# Patient Record
Sex: FEMALE | Race: WHITE | NOT HISPANIC OR LATINO | Employment: OTHER | URBAN - METROPOLITAN AREA
[De-identification: names, ages, dates, MRNs, and addresses within clinical notes are randomized per-mention and may not be internally consistent; named-entity substitution may affect disease eponyms.]

---

## 2017-06-14 ENCOUNTER — HOSPITAL ENCOUNTER (INPATIENT)
Facility: HOSPITAL | Age: 67
LOS: 2 days | Discharge: HOME | DRG: 641 | End: 2017-06-16
Attending: EMERGENCY MEDICINE | Admitting: INTERNAL MEDICINE
Payer: MEDICARE

## 2017-06-14 DIAGNOSIS — B37.0 THRUSH: ICD-10-CM

## 2017-06-14 DIAGNOSIS — H72.92 PERFORATED TYMPANIC MEMBRANE, LEFT: ICD-10-CM

## 2017-06-14 DIAGNOSIS — E87.1 HYPONATREMIA: Primary | ICD-10-CM

## 2017-06-14 PROBLEM — H66.91 CHRONIC OTITIS MEDIA OF RIGHT EAR WITH PERFORATED TYMPANIC MEMBRANE: Status: ACTIVE | Noted: 2017-06-14

## 2017-06-14 PROBLEM — I10 ESSENTIAL HYPERTENSION: Status: ACTIVE | Noted: 2017-06-14

## 2017-06-14 PROBLEM — Z72.0 TOBACCO ABUSE: Status: ACTIVE | Noted: 2017-06-14

## 2017-06-14 PROBLEM — H72.91 CHRONIC OTITIS MEDIA OF RIGHT EAR WITH PERFORATED TYMPANIC MEMBRANE: Status: ACTIVE | Noted: 2017-06-14

## 2017-06-14 LAB
ALBUMIN SERPL BCP-MCNC: 4 G/DL
ALP SERPL-CCNC: 77 U/L
ALT SERPL W/O P-5'-P-CCNC: 18 U/L
ANION GAP SERPL CALC-SCNC: 10 MMOL/L
APTT BLDCRRT: 31.5 SEC
AST SERPL-CCNC: 27 U/L
BASOPHILS # BLD AUTO: 0.02 K/UL
BASOPHILS NFR BLD: 0.4 %
BILIRUB SERPL-MCNC: 0.3 MG/DL
BNP SERPL-MCNC: 21 PG/ML
BUN SERPL-MCNC: 11 MG/DL
CALCIUM SERPL-MCNC: 10 MG/DL
CHLORIDE SERPL-SCNC: 78 MMOL/L
CO2 SERPL-SCNC: 28 MMOL/L
CREAT SERPL-MCNC: 0.9 MG/DL
DIFFERENTIAL METHOD: ABNORMAL
EOSINOPHIL # BLD AUTO: 0 K/UL
EOSINOPHIL NFR BLD: 0.4 %
ERYTHROCYTE [DISTWIDTH] IN BLOOD BY AUTOMATED COUNT: 18 %
EST. GFR  (AFRICAN AMERICAN): >60 ML/MIN/1.73 M^2
EST. GFR  (NON AFRICAN AMERICAN): >60 ML/MIN/1.73 M^2
GLUCOSE SERPL-MCNC: 128 MG/DL
HCT VFR BLD AUTO: 38.6 %
HGB BLD-MCNC: 13.6 G/DL
INR PPP: 1
LYMPHOCYTES # BLD AUTO: 1.3 K/UL
LYMPHOCYTES NFR BLD: 28.3 %
MAGNESIUM SERPL-MCNC: 2.2 MG/DL
MCH RBC QN AUTO: 26.1 PG
MCHC RBC AUTO-ENTMCNC: 35.2 %
MCV RBC AUTO: 74 FL
MONOCYTES # BLD AUTO: 0.5 K/UL
MONOCYTES NFR BLD: 11 %
NEUTROPHILS # BLD AUTO: 2.7 K/UL
NEUTROPHILS NFR BLD: 59.9 %
OSMOLALITY SERPL: 246 MOSM/KG
PLATELET # BLD AUTO: 298 K/UL
PMV BLD AUTO: 8.4 FL
POTASSIUM SERPL-SCNC: 4.4 MMOL/L
PROT SERPL-MCNC: 7.6 G/DL
PROTHROMBIN TIME: 10.1 SEC
RBC # BLD AUTO: 5.22 M/UL
SODIUM SERPL-SCNC: 116 MMOL/L
T4 FREE SERPL-MCNC: 1.25 NG/DL
TROPONIN I SERPL DL<=0.01 NG/ML-MCNC: <0.006 NG/ML
TSH SERPL DL<=0.005 MIU/L-ACNC: 0.57 UIU/ML
WBC # BLD AUTO: 4.53 K/UL

## 2017-06-14 PROCEDURE — 85730 THROMBOPLASTIN TIME PARTIAL: CPT

## 2017-06-14 PROCEDURE — 25000003 PHARM REV CODE 250: Performed by: EMERGENCY MEDICINE

## 2017-06-14 PROCEDURE — 25000003 PHARM REV CODE 250: Performed by: HOSPITALIST

## 2017-06-14 PROCEDURE — 99284 EMERGENCY DEPT VISIT MOD MDM: CPT | Mod: 25

## 2017-06-14 PROCEDURE — 84484 ASSAY OF TROPONIN QUANT: CPT

## 2017-06-14 PROCEDURE — 85610 PROTHROMBIN TIME: CPT

## 2017-06-14 PROCEDURE — 83930 ASSAY OF BLOOD OSMOLALITY: CPT

## 2017-06-14 PROCEDURE — 83880 ASSAY OF NATRIURETIC PEPTIDE: CPT

## 2017-06-14 PROCEDURE — 93005 ELECTROCARDIOGRAM TRACING: CPT

## 2017-06-14 PROCEDURE — 84439 ASSAY OF FREE THYROXINE: CPT

## 2017-06-14 PROCEDURE — 84443 ASSAY THYROID STIM HORMONE: CPT

## 2017-06-14 PROCEDURE — 21400001 HC TELEMETRY ROOM

## 2017-06-14 PROCEDURE — 84481 FREE ASSAY (FT-3): CPT

## 2017-06-14 PROCEDURE — 94640 AIRWAY INHALATION TREATMENT: CPT

## 2017-06-14 PROCEDURE — 80053 COMPREHEN METABOLIC PANEL: CPT

## 2017-06-14 PROCEDURE — 96360 HYDRATION IV INFUSION INIT: CPT

## 2017-06-14 PROCEDURE — 83735 ASSAY OF MAGNESIUM: CPT

## 2017-06-14 PROCEDURE — 25000242 PHARM REV CODE 250 ALT 637 W/ HCPCS: Performed by: EMERGENCY MEDICINE

## 2017-06-14 PROCEDURE — 63600175 PHARM REV CODE 636 W HCPCS: Performed by: EMERGENCY MEDICINE

## 2017-06-14 PROCEDURE — 85025 COMPLETE CBC W/AUTO DIFF WBC: CPT

## 2017-06-14 PROCEDURE — 36415 COLL VENOUS BLD VENIPUNCTURE: CPT

## 2017-06-14 RX ORDER — ACETAMINOPHEN 500 MG
1000 TABLET ORAL
Status: COMPLETED | OUTPATIENT
Start: 2017-06-14 | End: 2017-06-14

## 2017-06-14 RX ORDER — ASPIRIN 325 MG
325 TABLET ORAL DAILY
Status: DISCONTINUED | OUTPATIENT
Start: 2017-06-15 | End: 2017-06-15

## 2017-06-14 RX ORDER — IBUPROFEN 200 MG
1 TABLET ORAL DAILY
Status: DISCONTINUED | OUTPATIENT
Start: 2017-06-15 | End: 2017-06-16 | Stop reason: HOSPADM

## 2017-06-14 RX ORDER — ROSUVASTATIN CALCIUM 10 MG/1
10 TABLET, COATED ORAL DAILY
Status: DISCONTINUED | OUTPATIENT
Start: 2017-06-15 | End: 2017-06-16 | Stop reason: HOSPADM

## 2017-06-14 RX ORDER — POLYETHYLENE GLYCOL 3350 17 G/17G
17 POWDER, FOR SOLUTION ORAL DAILY
Status: DISCONTINUED | OUTPATIENT
Start: 2017-06-15 | End: 2017-06-16 | Stop reason: HOSPADM

## 2017-06-14 RX ORDER — ENOXAPARIN SODIUM 100 MG/ML
40 INJECTION SUBCUTANEOUS EVERY 24 HOURS
Status: DISCONTINUED | OUTPATIENT
Start: 2017-06-14 | End: 2017-06-16 | Stop reason: HOSPADM

## 2017-06-14 RX ORDER — VALACYCLOVIR HYDROCHLORIDE 500 MG/1
1000 TABLET, FILM COATED ORAL 3 TIMES DAILY PRN
Status: DISCONTINUED | OUTPATIENT
Start: 2017-06-14 | End: 2017-06-15

## 2017-06-14 RX ORDER — VALACYCLOVIR HYDROCHLORIDE 500 MG/1
1000 TABLET, FILM COATED ORAL 3 TIMES DAILY
Status: DISCONTINUED | OUTPATIENT
Start: 2017-06-14 | End: 2017-06-14

## 2017-06-14 RX ORDER — METOCLOPRAMIDE 10 MG/1
10 TABLET ORAL EVERY 6 HOURS PRN
Status: DISCONTINUED | OUTPATIENT
Start: 2017-06-14 | End: 2017-06-15

## 2017-06-14 RX ORDER — AMLODIPINE BESYLATE 5 MG/1
10 TABLET ORAL DAILY
Status: DISCONTINUED | OUTPATIENT
Start: 2017-06-15 | End: 2017-06-16 | Stop reason: HOSPADM

## 2017-06-14 RX ORDER — FLUCONAZOLE 50 MG/1
200 TABLET ORAL
Status: COMPLETED | OUTPATIENT
Start: 2017-06-14 | End: 2017-06-14

## 2017-06-14 RX ORDER — IPRATROPIUM BROMIDE AND ALBUTEROL SULFATE 2.5; .5 MG/3ML; MG/3ML
3 SOLUTION RESPIRATORY (INHALATION)
Status: COMPLETED | OUTPATIENT
Start: 2017-06-14 | End: 2017-06-14

## 2017-06-14 RX ORDER — BUTALBITAL, ACETAMINOPHEN AND CAFFEINE 50; 325; 40 MG/1; MG/1; MG/1
1 TABLET ORAL EVERY 6 HOURS PRN
Status: DISCONTINUED | OUTPATIENT
Start: 2017-06-14 | End: 2017-06-16 | Stop reason: HOSPADM

## 2017-06-14 RX ORDER — HYDRALAZINE HYDROCHLORIDE 20 MG/ML
10 INJECTION INTRAMUSCULAR; INTRAVENOUS EVERY 6 HOURS PRN
Status: DISCONTINUED | OUTPATIENT
Start: 2017-06-14 | End: 2017-06-16 | Stop reason: HOSPADM

## 2017-06-14 RX ORDER — IPRATROPIUM BROMIDE AND ALBUTEROL SULFATE 2.5; .5 MG/3ML; MG/3ML
3 SOLUTION RESPIRATORY (INHALATION) EVERY 6 HOURS
Status: DISCONTINUED | OUTPATIENT
Start: 2017-06-15 | End: 2017-06-16

## 2017-06-14 RX ORDER — SODIUM CHLORIDE 9 MG/ML
INJECTION, SOLUTION INTRAVENOUS CONTINUOUS
Status: DISCONTINUED | OUTPATIENT
Start: 2017-06-15 | End: 2017-06-15

## 2017-06-14 RX ORDER — LEVOTHYROXINE SODIUM 125 UG/1
125 TABLET ORAL DAILY
Status: DISCONTINUED | OUTPATIENT
Start: 2017-06-15 | End: 2017-06-16 | Stop reason: HOSPADM

## 2017-06-14 RX ORDER — ALBUTEROL SULFATE 2.5 MG/.5ML
5 SOLUTION RESPIRATORY (INHALATION)
Status: COMPLETED | OUTPATIENT
Start: 2017-06-14 | End: 2017-06-14

## 2017-06-14 RX ORDER — ZOLPIDEM TARTRATE 5 MG/1
5 TABLET ORAL NIGHTLY PRN
Status: DISCONTINUED | OUTPATIENT
Start: 2017-06-14 | End: 2017-06-14

## 2017-06-14 RX ADMIN — ENOXAPARIN SODIUM 40 MG: 100 INJECTION SUBCUTANEOUS at 10:06

## 2017-06-14 RX ADMIN — FLUCONAZOLE 200 MG: 50 TABLET ORAL at 06:06

## 2017-06-14 RX ADMIN — BUTALBITAL, ACETAMINOPHEN AND CAFFEINE 1 TABLET: 50; 325; 40 TABLET ORAL at 10:06

## 2017-06-14 RX ADMIN — SODIUM CHLORIDE 1000 ML: 0.9 INJECTION, SOLUTION INTRAVENOUS at 06:06

## 2017-06-14 RX ADMIN — ALBUTEROL SULFATE 5 MG: 2.5 SOLUTION RESPIRATORY (INHALATION) at 03:06

## 2017-06-14 RX ADMIN — ACETAMINOPHEN 500 MG: 500 TABLET ORAL at 08:06

## 2017-06-14 RX ADMIN — IPRATROPIUM BROMIDE AND ALBUTEROL SULFATE 3 ML: .5; 3 SOLUTION RESPIRATORY (INHALATION) at 04:06

## 2017-06-14 RX ADMIN — SODIUM CHLORIDE 1000 ML: 0.9 INJECTION, SOLUTION INTRAVENOUS at 11:06

## 2017-06-14 NOTE — ED PROVIDER NOTES
"Encounter Date: 6/14/2017    SCRIBE #1 NOTE: I, Mitesh Santosh, am scribing for, and in the presence of, Javier Bridges MD. Other sections scribed: HPI, ROS.       History     Chief Complaint   Patient presents with    Otalgia     States she has chronic ear problems and her L ear is uncomfortable and not draining. States she has pressure in the ear.     Shortness of Breath     States she feels SOB and wants to be checked because she's not sure if it is head/sinus related or lung related. Not a good historian.     Review of patient's allergies indicates:   Allergen Reactions    Benzodiazepines     Lidocaine     Toradol [ketorolac]     Tramadol      CC: Otalgia, Shortness of Breath  HPI: This 66 y.o. female smoker with rheumatic disease, HTN, asthma, and Hx of thyroid cancer s/p thyroidectomy and Iodine 131 therapy presents to the ED c/o worsening of her chronic L ear pain ("pressure") over the past week or so. She states that her ear is "not draining anymore", causing her to feel SOB "like I'm not moving enough air into my head" and have decreased hearing in her L ear. She reports dry mouth that has developed within the past day. Pt reports that her L eardrum has been perforated and giving her problems since she had radiation therapy done for her thyroid cancer 25 years ago. Pt states that she is often on abx for her ear problems, and she has been taking 1 dose of Bactrim daily for the past week. She states that she is out of her Diflucan; pt is concerned that "the fungus" may be colonizing her mouth. Pt also c/o SOB for the past week; she states happened before when she had ear problems. Pt denies fever, chills, cough, chest pain, abdominal pain.      The history is provided by the patient.     Past Medical History:   Diagnosis Date    Asthma     Back problem     Calcaneal spur     Chicken pox 2015    Hypertension     Rheumatic disease     Thyroid cancer      Past Surgical History:   Procedure Laterality " Date    HYSTERECTOMY      left knee      THYROIDECTOMY      TONSILLECTOMY       Family History   Problem Relation Age of Onset    Depression Mother     Hypertension Sister     Blindness Neg Hx     Glaucoma Neg Hx     Macular degeneration Neg Hx     Retinal detachment Neg Hx      Social History   Substance Use Topics    Smoking status: Current Every Day Smoker     Packs/day: 0.50     Years: 15.00     Types: Cigarettes    Smokeless tobacco: Never Used    Alcohol use No     Review of Systems   Constitutional: Negative for chills and fever.   HENT: Positive for ear pain (L). Negative for ear discharge and sore throat.         (+) dry mouth   Eyes: Negative for pain and visual disturbance.   Respiratory: Positive for shortness of breath. Negative for cough.    Cardiovascular: Negative for chest pain.   Gastrointestinal: Negative for abdominal pain, diarrhea, nausea and vomiting.   Genitourinary: Negative for difficulty urinating and dysuria.   Musculoskeletal: Negative for arthralgias and myalgias.   Skin: Negative for rash and wound.   Neurological: Negative for dizziness and headaches.       Physical Exam     Initial Vitals [06/14/17 1359]   BP Pulse Resp Temp SpO2   120/64 94 18 97.8 °F (36.6 °C) (!) 94 %     Physical Exam  The patient was examined specifically for the following:   General:No significant distress, Good color, Warm and dry. Head and neck:Scalp atraumatic, Neck supple. Neurological:Appropriate conversation, Gross motor deficits. Eyes:Conjugate gaze, Clear corneas. ENT: No epistaxis. Cardiac: Regular rate and rhythm, Grossly normal heart tones. Pulmonary: Wheezing, Rales. Gastrointestinal: Abdominal tenderness, Abdominal distention. Musculoskeletal: Extremity deformity, Apparent pain with range of motion of the joints. Skin: Rash.   The findings on examination were normal except for the following: The patient does seem to have labored breathing.  The lungs are clear and free wheezing rales  of the rhonchi.  There may be some reduced breath sounds in the right base.  The patient has what looks like old perforated tympanic membrane.  There is no erythema of the tympanic membrane.  There is no pus or discharge in the canal.  The patient has small whitish exudates on the roof of the mouth.  She has had thrush before.  The remainder the pharynx is unremarkable.  The abdomen is soft.  Heart tones are normal.  The patient has a regular rate and rhythm.  ED Course   Procedures  Labs Reviewed   COMPREHENSIVE METABOLIC PANEL - Abnormal; Notable for the following:        Result Value    Sodium 116 (*)     Chloride 78 (*)     Glucose 128 (*)     All other components within normal limits    Narrative:     NA   critical result(s) called and verbal readback obtained from   EMBER GAMEZ., 06/14/2017 16:42   CBC W/ AUTO DIFFERENTIAL - Abnormal; Notable for the following:     MCV 74 (*)     MCH 26.1 (*)     RDW 18.0 (*)     MPV 8.4 (*)     All other components within normal limits   APTT   PROTIME-INR   TROPONIN I   B-TYPE NATRIURETIC PEPTIDE   MAGNESIUM     EKG Readings: (Independently Interpreted)   The patient's EKG reveals a normal sinus rhythm with a heart rate of 90.  The ND QRS and QT intervals are normal.  There are nonspecific T-wave changes.  There are no significant ST segment changes.  There is poor R-wave progression across precordium.  Patient has a normal axis.       X-Rays:   Independently Interpreted Readings:   Other Readings:  Chest x-ray fails to reveal pneumothorax pneumonia pleural effusion               Scribe Attestation:   Scribe #1: I performed the above scribed service and the documentation accurately describes the services I performed. I attest to the accuracy of the note.    Attending Attestation:           Physician Attestation for Scribe:  Physician Attestation Statement for Scribe #1: I, Javier Bridges MD, reviewed documentation, as scribed by Mitesh Frost in my presence, and it is both  accurate and complete.                 ED Course     Clinical Impression:   The primary encounter diagnosis was Hyponatremia. Diagnoses of Thrush and Perforated tympanic membrane, left were also pertinent to this visit.          Javier Bridges MD  06/14/17 1943

## 2017-06-14 NOTE — ED TRIAGE NOTES
"Pt amb to room 1, here for evaluation of dizziness and shortness of breath  Onset 5 days being evaluated by primary physician for left ear infection that keeps reoccurring. Pt states, "I feel like I've been having labored breathing". No relief with bactrim. Pt states, " I think it's fungus". C/o being thristy. midsternal chest pressure x one week  "

## 2017-06-15 LAB
ANION GAP SERPL CALC-SCNC: 7 MMOL/L
BUN SERPL-MCNC: 7 MG/DL
CALCIUM SERPL-MCNC: 8.5 MG/DL
CHLORIDE SERPL-SCNC: 87 MMOL/L
CO2 SERPL-SCNC: 29 MMOL/L
CREAT SERPL-MCNC: 0.6 MG/DL
EST. GFR  (AFRICAN AMERICAN): >60 ML/MIN/1.73 M^2
EST. GFR  (NON AFRICAN AMERICAN): >60 ML/MIN/1.73 M^2
ESTIMATED AVG GLUCOSE: 143 MG/DL
GLUCOSE SERPL-MCNC: 89 MG/DL
HBA1C MFR BLD HPLC: 6.6 %
OSMOLALITY UR: 227 MOSM/KG
POTASSIUM SERPL-SCNC: 3.6 MMOL/L
SODIUM SERPL-SCNC: 122 MMOL/L
SODIUM SERPL-SCNC: 123 MMOL/L
SODIUM SERPL-SCNC: 124 MMOL/L
SODIUM SERPL-SCNC: 124 MMOL/L
SODIUM UR-SCNC: 82 MMOL/L
T3FREE SERPL-MCNC: 2.4 PG/ML

## 2017-06-15 PROCEDURE — 94640 AIRWAY INHALATION TREATMENT: CPT

## 2017-06-15 PROCEDURE — 25000003 PHARM REV CODE 250: Performed by: INTERNAL MEDICINE

## 2017-06-15 PROCEDURE — 83935 ASSAY OF URINE OSMOLALITY: CPT

## 2017-06-15 PROCEDURE — 83036 HEMOGLOBIN GLYCOSYLATED A1C: CPT

## 2017-06-15 PROCEDURE — 80048 BASIC METABOLIC PNL TOTAL CA: CPT

## 2017-06-15 PROCEDURE — 84295 ASSAY OF SERUM SODIUM: CPT | Mod: 91

## 2017-06-15 PROCEDURE — 84295 ASSAY OF SERUM SODIUM: CPT

## 2017-06-15 PROCEDURE — 25000242 PHARM REV CODE 250 ALT 637 W/ HCPCS: Performed by: HOSPITALIST

## 2017-06-15 PROCEDURE — 27000221 HC OXYGEN, UP TO 24 HOURS

## 2017-06-15 PROCEDURE — 21400001 HC TELEMETRY ROOM

## 2017-06-15 PROCEDURE — 63600175 PHARM REV CODE 636 W HCPCS: Performed by: EMERGENCY MEDICINE

## 2017-06-15 PROCEDURE — 25000003 PHARM REV CODE 250: Performed by: EMERGENCY MEDICINE

## 2017-06-15 PROCEDURE — 36415 COLL VENOUS BLD VENIPUNCTURE: CPT

## 2017-06-15 PROCEDURE — 84300 ASSAY OF URINE SODIUM: CPT

## 2017-06-15 PROCEDURE — 25000003 PHARM REV CODE 250: Performed by: HOSPITALIST

## 2017-06-15 PROCEDURE — 94761 N-INVAS EAR/PLS OXIMETRY MLT: CPT

## 2017-06-15 RX ORDER — PROMETHAZINE HYDROCHLORIDE 6.25 MG/5ML
12.5 SYRUP ORAL EVERY 6 HOURS PRN
Status: DISCONTINUED | OUTPATIENT
Start: 2017-06-15 | End: 2017-06-16 | Stop reason: HOSPADM

## 2017-06-15 RX ORDER — ASPIRIN 81 MG/1
81 TABLET ORAL DAILY
Status: DISCONTINUED | OUTPATIENT
Start: 2017-06-16 | End: 2017-06-16 | Stop reason: HOSPADM

## 2017-06-15 RX ADMIN — GUAIFENESIN AND DEXTROMETHORPHAN HYDROBROMIDE 1 TABLET: 600; 30 TABLET, EXTENDED RELEASE ORAL at 04:06

## 2017-06-15 RX ADMIN — IPRATROPIUM BROMIDE AND ALBUTEROL SULFATE 3 ML: .5; 3 SOLUTION RESPIRATORY (INHALATION) at 08:06

## 2017-06-15 RX ADMIN — ROSUVASTATIN CALCIUM 10 MG: 10 TABLET ORAL at 08:06

## 2017-06-15 RX ADMIN — LEVOTHYROXINE SODIUM 125 MCG: 125 TABLET ORAL at 05:06

## 2017-06-15 RX ADMIN — BUTALBITAL, ACETAMINOPHEN AND CAFFEINE 1 TABLET: 50; 325; 40 TABLET ORAL at 11:06

## 2017-06-15 RX ADMIN — BUTALBITAL, ACETAMINOPHEN AND CAFFEINE 1 TABLET: 50; 325; 40 TABLET ORAL at 08:06

## 2017-06-15 RX ADMIN — ENOXAPARIN SODIUM 40 MG: 100 INJECTION SUBCUTANEOUS at 04:06

## 2017-06-15 RX ADMIN — BUTALBITAL, ACETAMINOPHEN AND CAFFEINE 1 TABLET: 50; 325; 40 TABLET ORAL at 02:06

## 2017-06-15 RX ADMIN — AMLODIPINE BESYLATE 10 MG: 5 TABLET ORAL at 08:06

## 2017-06-15 RX ADMIN — IPRATROPIUM BROMIDE AND ALBUTEROL SULFATE 3 ML: .5; 3 SOLUTION RESPIRATORY (INHALATION) at 02:06

## 2017-06-15 RX ADMIN — SODIUM CHLORIDE 125 ML/HR: 0.9 INJECTION, SOLUTION INTRAVENOUS at 12:06

## 2017-06-15 RX ADMIN — PROMETHAZINE HYDROCHLORIDE 12.5 MG: 6.25 SOLUTION ORAL at 04:06

## 2017-06-15 RX ADMIN — ASPIRIN 325 MG ORAL TABLET 325 MG: 325 PILL ORAL at 08:06

## 2017-06-15 RX ADMIN — POLYETHYLENE GLYCOL 3350 17 G: 17 POWDER, FOR SOLUTION ORAL at 08:06

## 2017-06-15 RX ADMIN — IPRATROPIUM BROMIDE AND ALBUTEROL SULFATE 3 ML: .5; 3 SOLUTION RESPIRATORY (INHALATION) at 12:06

## 2017-06-15 RX ADMIN — IPRATROPIUM BROMIDE AND ALBUTEROL SULFATE 3 ML: .5; 3 SOLUTION RESPIRATORY (INHALATION) at 07:06

## 2017-06-15 NOTE — SUBJECTIVE & OBJECTIVE
Interval History: patient complains of sinus pressure and left ear pressure. Asking for phenergan + codeine and mucinex for this. Does not want a nasal spray. Sodium increasing appropriately with fluids. At goal for the first 24 hours.     Review of Systems   Constitutional: Negative.    HENT:        Ear congestion, sinus congestion   Eyes: Negative.    Respiratory: Negative.    Cardiovascular: Negative.    Gastrointestinal: Negative.    Endocrine: Negative.    Genitourinary: Negative.    Musculoskeletal: Negative.    Skin: Negative.    Allergic/Immunologic: Negative.    Neurological: Negative.    Hematological: Negative.    Psychiatric/Behavioral: Negative.      Objective:     Vital Signs (Most Recent):  Temp: 98.6 °F (37 °C) (06/15/17 1100)  Pulse: 82 (06/15/17 1241)  Resp: 19 (06/15/17 1241)  BP: 101/63 (06/15/17 1100)  SpO2: 95 % (06/15/17 1241) Vital Signs (24h Range):  Temp:  [97.7 °F (36.5 °C)-98.6 °F (37 °C)] 98.6 °F (37 °C)  Pulse:  [68-89] 82  Resp:  [17-20] 19  SpO2:  [91 %-99 %] 95 %  BP: ()/(53-74) 101/63     Weight: 54.2 kg (119 lb 6.4 oz)  Body mass index is 20.49 kg/m².    Intake/Output Summary (Last 24 hours) at 06/15/17 1717  Last data filed at 06/15/17 0900   Gross per 24 hour   Intake             1854 ml   Output             2800 ml   Net             -946 ml      Physical Exam   Constitutional: She is oriented to person, place, and time. She appears well-developed. No distress.   HENT:   Right Ear: Hearing, tympanic membrane, external ear and ear canal normal.   Left Ear: External ear and ear canal normal. No lacerations. No drainage, swelling or tenderness. No foreign bodies. No mastoid tenderness. Tympanic membrane is perforated (old).  No middle ear effusion.   Eyes: Conjunctivae and EOM are normal.   Cardiovascular: Normal rate and regular rhythm.    Pulmonary/Chest: Effort normal and breath sounds normal.   Abdominal: Soft. Bowel sounds are normal.   Musculoskeletal: Normal range of  motion. She exhibits no edema.   Lymphadenopathy:     She has no cervical adenopathy.   Neurological: She is alert and oriented to person, place, and time. No cranial nerve deficit. She exhibits normal muscle tone.   Skin: Skin is warm and dry. She is not diaphoretic.   Psychiatric:   Tangential   Nursing note and vitals reviewed.      Significant Labs: All pertinent labs within the past 24 hours have been reviewed.    Significant Imaging: I have reviewed all pertinent imaging results/findings within the past 24 hours.  I have reviewed and interpreted all pertinent imaging results/findings within the past 24 hours.

## 2017-06-15 NOTE — PLAN OF CARE
Problem: Patient Care Overview  Goal: Plan of Care Review  Outcome: Ongoing (interventions implemented as appropriate)  No falls this shift bed kept in low position call light and phone remain within reach. Patient able to reposition self in bed without assist. No evidence of skin breakdown noted.     Patient complaining of sinus congestion, migraine headache and nausea no emesis noted. , Fioricet order q6hrs PRN and phenergan PO q6hrs,     Admit Na+ 116 patient given 1L bolus and started on NS at 125 which was d/c today about 1200. As of 1217 today Na+ 124.    Room air oxygen saturation 75%, oxygen saturation low 90s on 2L of oxygen. Breath sounds diminished.   6/15/17 CXR: No acute cardiopulmonary disease

## 2017-06-15 NOTE — ASSESSMENT & PLAN NOTE
Discontinue Bactrim.  No objective indication to continue antibiotics at this point. I personally looked in her ear with an otoscope and there is no sign of active infection. Has a perforated left TM. Has no lymphadenopathy  Follow-up with ENT as an outpatient

## 2017-06-15 NOTE — HPI
66 y.o. female that (in part)  has a past medical history of Asthma; Back problem; Calcaneal spur; Chicken pox (2015); Hypertension; Rheumatic disease; and Thyroid cancer. Presents to [facility] with complaint of shortness of breath and left ear pain.  She was seen by her primary care physician 5 days ago was started on Bactrim for a chronic left ear infection.  She states problems began 25 years ago when she was receiving radiation for thyroid cancer.  She is a very poor historian and makes mention that is a possible fungal infection.  Associated symptoms include increased thirst.  Denies fever or chills.  No radiation of pain.  Recurrent frequency.  Constant duration.  Not relieved with Bactrim.     As far as the Shortness of breath she deniesh,  hemoptysis, stridor, wheezing, or night sweats. she smoked half a pack of cigarettes for the last 15 years.       In the emergency department routine laboratory studies were obtained which demonstrated a serum sodium level of 116.  The most recent laboratory studies we have are from 2014 and this hyponatremia appears to be new.     Hospital medicine has been asked to admit for further evaluation and treatment.

## 2017-06-15 NOTE — PLAN OF CARE
Problem: Fluid Volume Excess (Adult,Obstetrics,Pediatric)  Intervention: Monitor/Manage Hypervolemia   06/15/17 0624   Activity   Activity Type activity adjusted per tolerance;activity clustered for rest period;ambulated in room;ambulated to bathroom   Nutrition Interventions   Fluid/Electrolyte Management fluids restricted;intravenous fluid replacement initiated         Comments: Pt admitted with hyponatremia currently on normal saline at 125ml/hour, pt was given a normal saline bolus. Last sodium was 123 admit sodium was 116. Pt is without confusion or parasthesia.pt is currently on sodium levels every 4 hours.

## 2017-06-15 NOTE — ASSESSMENT & PLAN NOTE
Hyponatremia possibly due to Bactrim as this is a known side effect.  · Will give IV NS fluids to correct serum Na no faster than 12 mEQ per 24 hour period  · Frequent Na level checks  · Low threshold to consult nephrology to assist with electrolyte management  · Goal is a serum sodium of 128 after the first 24 hours since admission to prevent central pontine myelinolysis  · Urine Na / Urine Cr pending / urine osmolality / serum osmolality

## 2017-06-15 NOTE — PROGRESS NOTES
Pt arrived on the nsg. From the ED dept. accompanied per transporter in w/c . Pt aaox4 with no c/o of pain or any discomfort. Pt vital signs taken,telemetry monitor applied,pt informed of md orders. Pt oriented to environment.

## 2017-06-15 NOTE — ASSESSMENT & PLAN NOTE
Hyponatremia possibly due to Bactrim as this is a known side effect. Currently at goal for the first 24 hours. Stop fluids and allow for PO intake. Continue to check sodium every 6 hours.

## 2017-06-15 NOTE — H&P
[facility] Hospital Medicine  History & Physical    Patient Name: Di Deluca  MRN: [id number]  Admission Date: 06/14/2017  Attending Physician: Richard Cevallos MD, MPH      PCP:     Thomas Huertas MD    CC:     Chief Complaint   Patient presents with    Otalgia     States she has chronic ear problems and her L ear is uncomfortable and not draining. States she has pressure in the ear.     Shortness of Breath     States she feels SOB and wants to be checked because she's not sure if it is head/sinus related or lung related. Not a good historian.       HISTORY OF PRESENT ILLNESS:     Di Deluca is a 66 y.o. female that (in part)  has a past medical history of Asthma; Back problem; Calcaneal spur; Chicken pox (2015); Hypertension; Rheumatic disease; and Thyroid cancer. Presents to [facility] Emergency Department with complaint of shortness of breath and left ear pain.  She was seen by her primary care physician 5 days ago was started on Bactrim for a chronic left ear infection.  She states problems began 25 years ago when she was receiving radiation for thyroid cancer.  She is a very poor historian and makes mention that is a possible fungal infection.  Associated symptoms include increased thirst.  Denies fever or chills.  No radiation of pain.  Recurrent frequency.  Constant duration.  Not relieved with Bactrim.    As far as the Shortness of breath she deniesh,  hemoptysis, stridor, wheezing, or night sweats. she smoked half a pack of cigarettes for the last 15 years.      In the emergency department routine laboratory studies were obtained which demonstrated a serum sodium level of 116.  The most recent laboratory studies we have are from 2014 and this hyponatremia appears to be new.    Hospital medicine has been asked to admit for further evaluation and treatment.       REVIEW OF SYSTEMS:     -- Constitutional: No fever or chills.  -- Eyes: No visual changes,  diplopia, pain, tearing, blind spots, or discharge.   -- Ears, nose, mouth, throat, and face: As noted above in history of present illness.  -- Respiratory: As above in history of present illness  -- Cardiovascular: No chest pain, HI, syncope, PND, edema, cyanosis, or palpitations.   -- Gastrointestinal: No vomiting, abdominal pain, hematemesis, melena, dyspepsia, or change in bowel habits.  -- Genitourinary: No hematuria, dysuria, frequency, urgency, nocturia, polyuria, stones, or incontinence.  -- Integument/breast: No rash, pruritis, pigmentation changes, dryness, or changes in hair  -- Hematologic/lymphatic: No easy bruising or lymphadenopathy.   -- Musculoskeletal: No acute arthralgias, acute myalgias, joint swelling, acute limitations of ROM, or acute muscular weakness.  -- Neurological: Chronic headaches.  No seizures, , incoordination, paraesthesias, ataxia, vertigo, or tremors.  -- Behavioral/Psych: No auditory or visual hallucinations, depression, or suicidal/homicidal ideations.  -- Endocrine: No heat or cold intolerance, polydipsia, or unintentional weight gain / loss.  -- Allergy/Immunologic: No recurrent infections or adverse reaction to food, insects, or difficulty breathing.        PAST MEDICAL / SURGICAL HISTORY:     Past Medical History:   Diagnosis Date    Asthma     Back problem     Calcaneal spur     Chicken pox 2015    Hypertension     Rheumatic disease     Thyroid cancer      Past Surgical History:   Procedure Laterality Date    HYSTERECTOMY      left knee      THYROIDECTOMY      TONSILLECTOMY           FAMILY HISTORY:     Family History   Problem Relation Age of Onset    Depression Mother     Hypertension Sister     Blindness Neg Hx     Glaucoma Neg Hx     Macular degeneration Neg Hx     Retinal detachment Neg Hx          SOCIAL HISTORY:     Social History   Substance Use Topics    Smoking status: Current Every Day Smoker     Packs/day: 0.50     Years: 15.00     Types:  Cigarettes    Smokeless tobacco: Never Used    Alcohol use No     Social History     Social History    Marital status:      Spouse name: N/A    Number of children: N/A    Years of education: N/A     Social History Main Topics    Smoking status: Current Every Day Smoker     Packs/day: 0.50     Years: 15.00     Types: Cigarettes    Smokeless tobacco: Never Used    Alcohol use No    Drug use: No    Sexual activity: No     Other Topics Concern    None     Social History Narrative    None         ALLERGIES:       Review of patient's allergies indicates:   Allergen Reactions    Benzodiazepines     Lidocaine     Toradol [ketorolac]     Tramadol          HEALTH SCREENING:     Prevnar 13 pneumonia vaccine = no evidence of previous vaccination found in the medical record      HOME MEDICATIONS:     Prior to Admission medications    Medication Sig Start Date End Date Taking? Authorizing Provider   albuterol (PROAIR HFA) 90 mcg/actuation inhaler Inhale 2 puffs into the lungs every 6 (six) hours as needed for Wheezing. 5/18/14 5/18/15  Javier Garcia III, MD   amlodipine (NORVASC) 10 MG tablet Take 1 tablet (10 mg total) by mouth once daily. 2/22/15   Blanca Berry MD   aspirin 325 MG tablet Take 325 mg by mouth once daily.    Historical Provider, MD   butalbital-aspirin-caffeine -40 mg (FIORINAL) -40 mg Cap Take 1 capsule by mouth every 4 (four) hours as needed.    Historical Provider, MD   cephALEXin (KEFLEX) 500 MG capsule Take 500 mg by mouth 4 (four) times daily.    Historical Provider, MD   clotrimazole (LOTRIMIN) 1 % cream Apply to affected area 2 times daily 9/7/15   NITIN Carr   desonide (DESOWEN) 0.05 % cream Apply topically 2 (two) times daily. 9/7/15 9/17/15  NITIN Carr   hydrOXYzine HCl (ATARAX) 25 MG tablet Take 1 tablet (25 mg total) by mouth every 6 (six) hours. 9/25/15   Hardy Moreau III, MD   levothyroxine (SYNTHROID) 125 MCG tablet Take 125 mcg by  "mouth once daily.    Historical Provider, MD   naproxen sodium (ANAPROX) 220 MG tablet Take 220 mg by mouth every 12 (twelve) hours.    Historical Provider, MD   oxycodone-acetaminophen (PERCOCET) 5-325 mg per tablet Take 2 tablets by mouth every 6 (six) hours as needed for Pain. 9/25/15   Hardy Moreau III, MD   rosuvastatin (CRESTOR) 10 MG tablet Take 10 mg by mouth once daily.    Historical Provider, MD   valacyclovir (VALTREX) 1000 MG tablet Take 1 tablet (1,000 mg total) by mouth 3 (three) times daily. 9/25/15 10/9/15  Hardy Moreau III, MD          HOSPITAL MEDICATIONS:     Scheduled Meds:    [START ON 6/15/2017] aspirin  325 mg Oral Daily    enoxaparin  40 mg Subcutaneous Daily    [START ON 6/15/2017] levothyroxine  125 mcg Oral Daily    [START ON 6/15/2017] polyethylene glycol  17 g Oral Daily    [START ON 6/15/2017] rosuvastatin  10 mg Oral Daily     Continuous Infusions:    PRN Meds: butalbital-acetaminophen-caffeine -40 mg, metoclopramide HCl, valacyclovir, zolpidem      PHYSICAL EXAM:     Wt Readings from Last 1 Encounters:   06/14/17 2124 54.2 kg (119 lb 6.4 oz)   06/14/17 1359 58.1 kg (128 lb)     Body mass index is 20.49 kg/m².  Vitals:    06/14/17 2004 06/14/17 2024 06/14/17 2124 06/14/17 2201   BP:   113/74    BP Location:   Right arm    Patient Position:       BP Method:   Automatic    Pulse:   74    Resp: 20  20    Temp: 97.7 °F (36.5 °C) 97.7 °F (36.5 °C) 97.7 °F (36.5 °C) 97.7 °F (36.5 °C)   TempSrc: Oral  Oral Oral   SpO2:   (!) 94% (!) 94%   Weight:   54.2 kg (119 lb 6.4 oz)    Height:   5' 4" (1.626 m)           -- General appearance: chronically ill appearing, well developed. appears stated age   -- Head/ears :  Chronic appearing Right tympanic perforation.  No drainage or hemorrhage or foreign body noted.  normocephalic, atraumatic   -- Eyes: conjunctivae clear. Extraocular muscles intact  -- Nose: Nares normal. Septum midline.   -- Mouth/Throat: lips, mucosa, and tongue normal. " no throat erythema.   -- Neck:  evidence of previous thyroid surgery.  supple, symmetrical, trachea midline, no JVD and thyroid not grossly enlarged, appears symmetric  -- Lungs: clear to auscultation bilaterally. normal respiratory effort. No use of accessory muscles.   -- Chest wall: no tenderness. equal bilateral chest rise   -- Heart: regular rate and rhythm. S1, S2 normal.  no click, rub or gallop   -- Abdomen: soft, non-tender, non-distended, non-tympanic; bowel sounds normal; no masses  -- Extremities: no cyanosis, clubbing or edema.   -- Pulses: 2+ and symmetric   -- Skin: color normal, texture normal, turgor normal. No rashes or lesions.   -- Neurologic: Normal strength and tone. No focal numbness or weakness. CNII-XII intact. Simone coma scale: eyes open spontaneously-4, oriented & converses-5, obeys commands-6.      LABORATORY STUDIES:     Recent Results (from the past 36 hour(s))   Comprehensive metabolic panel    Collection Time: 06/14/17  3:41 PM   Result Value Ref Range    Sodium 116 (LL) 136 - 145 mmol/L    Potassium 4.4 3.5 - 5.1 mmol/L    Chloride 78 (L) 95 - 110 mmol/L    CO2 28 23 - 29 mmol/L    Glucose 128 (H) 70 - 110 mg/dL    BUN, Bld 11 8 - 23 mg/dL    Creatinine 0.9 0.5 - 1.4 mg/dL    Calcium 10.0 8.7 - 10.5 mg/dL    Total Protein 7.6 6.0 - 8.4 g/dL    Albumin 4.0 3.5 - 5.2 g/dL    Total Bilirubin 0.3 0.1 - 1.0 mg/dL    Alkaline Phosphatase 77 55 - 135 U/L    AST 27 10 - 40 U/L    ALT 18 10 - 44 U/L    Anion Gap 10 8 - 16 mmol/L    eGFR if African American >60 >60 mL/min/1.73 m^2    eGFR if non African American >60 >60 mL/min/1.73 m^2   APTT    Collection Time: 06/14/17  3:41 PM   Result Value Ref Range    aPTT 31.5 21.0 - 32.0 sec   Protime-INR    Collection Time: 06/14/17  3:41 PM   Result Value Ref Range    Prothrombin Time 10.1 9.0 - 12.5 sec    INR 1.0 0.8 - 1.2   Troponin I    Collection Time: 06/14/17  3:41 PM   Result Value Ref Range    Troponin I <0.006 0.000 - 0.026 ng/mL   Brain  natriuretic peptide    Collection Time: 06/14/17  3:41 PM   Result Value Ref Range    BNP 21 0 - 99 pg/mL   Magnesium    Collection Time: 06/14/17  3:41 PM   Result Value Ref Range    Magnesium 2.2 1.6 - 2.6 mg/dL   CBC auto differential    Collection Time: 06/14/17  3:41 PM   Result Value Ref Range    WBC 4.53 3.90 - 12.70 K/uL    RBC 5.22 4.00 - 5.40 M/uL    Hemoglobin 13.6 12.0 - 16.0 g/dL    Hematocrit 38.6 37.0 - 48.5 %    MCV 74 (L) 82 - 98 fL    MCH 26.1 (L) 27.0 - 31.0 pg    MCHC 35.2 32.0 - 36.0 %    RDW 18.0 (H) 11.5 - 14.5 %    Platelets 298 150 - 350 K/uL    MPV 8.4 (L) 9.2 - 12.9 fL    Gran # 2.7 1.8 - 7.7 K/uL    Lymph # 1.3 1.0 - 4.8 K/uL    Mono # 0.5 0.3 - 1.0 K/uL    Eos # 0.0 0.0 - 0.5 K/uL    Baso # 0.02 0.00 - 0.20 K/uL    Gran% 59.9 38.0 - 73.0 %    Lymph% 28.3 18.0 - 48.0 %    Mono% 11.0 4.0 - 15.0 %    Eosinophil% 0.4 0.0 - 8.0 %    Basophil% 0.4 0.0 - 1.9 %    Differential Method Automated        Lab Results   Component Value Date    INR 1.0 06/14/2017    INR 1.1 05/18/2014         IMAGING:     Imaging Results          X-Ray Chest AP Portable (Final result)  Result time 06/14/17 16:18:00    Final result by Terrance Mccray MD (06/14/17 16:18:00)                 Impression:      No acute cardiopulmonary disease      Electronically signed by: TERRANCE MCCRAY MD  Date:     06/14/17  Time:    16:17              Narrative:    Single view Chest radiograph dated June 14, 2017    Clinical history:Chest pain    Comparison:No prior study    Findings:  The trachea and cardiomediastinal silhouette are within normal limits.  There is no evidence of pleural effusions, pneumothoraces or consolidations.  Lungs are clear.  Osseous structures demonstrate no evidence for acute fractures or dislocations.                                ASSESSMENT & PLAN:     Primary Diagnosis:  Hyponatremia    Active Hospital Problems    Diagnosis  POA    *Hyponatremia [E87.1]  Yes     Priority: 1 - High    Essential hypertension  [I10]  Yes     Priority: 2     Chronic otitis media of right ear with perforated tympanic membrane [H66.91, H72.91]  Yes     Priority: 3     Tobacco abuse [Z72.0]  Yes    History of thyroid cancer [Z85.850]  Not Applicable      Resolved Hospital Problems    Diagnosis Date Resolved POA   No resolved problems to display.         Hyponatremia  Hyponatremia possibly due to Bactrim as this is a known side effect.  · Will give IV NS fluids to correct serum Na no faster than 12 mEQ per 24 hour period  · Frequent Na level checks  · Low threshold to consult nephrology to assist with electrolyte management  · Goal is a serum sodium of 128 after the first 24 hours since admission to prevent central pontine myelinolysis  · Urine Na / Urine Cr pending / urine osmolality / serum osmolality        Essential hypertension  · Goal while inpatient is a systolic blood pressure less than 160mmHg  · BP in acceptable range at this time  · Continue current home regimen with hold parameters  · PRN antihypertensives available        Chronic otitis media of right ear with perforated tympanic membrane  Discontinue Bactrim.  No objective indication to continue antibiotics at this point.    Follow-up with ENT as an outpatient      History of thyroid cancer  Continue Synthroid      Tobacco abuse  Tobacco abuse  · Chronic tobacco use  · Tobacco cessation counseling  · Nicotine patch offered; consider Wellbutrin or Chantix through PCP as an outpatient (will require closer monitoring)  · I discussed with the patient regarding the hazardous effects of smoking on increasing risk of heart attack and stroke, worsening lung functions, and increasing cancer risk.   Patient was urged to stop smoking now.  I also offered nicotine taper (such as nicotine patch and gum) to help ease the craving to smoke.            VTE Risk Mitigation         Ordered     enoxaparin injection 40 mg  Daily     Route:  Subcutaneous        06/14/17 2200     Medium Risk of VTE   Once      06/14/17 2200            Adult PRN medications available   DVT prophylaxis given       DISPOSITION:     Will admit to the Hospital Medicine service for further evaluation and treatment.    Chart reviewed and updated where applicable.    High Risk Conditions:  Patient has a condition that poses threat to life and bodily function: Hyponatremia       ===============================================================    Richard Cevallos MD, MPH  Department of Hospital Medicine   [facility]  [phone] pg  (7pm - 6am)          This note is dictated using Dragon Medical 360 voice recognition software.  There are word recognition mistakes that are occasionally missed on review.

## 2017-06-15 NOTE — PROGRESS NOTES
[facility] Hospital Medicine  Progress Note    Patient Name: Di Deluca  MRN: [id number]  Patient Class: IP- Inpatient   Admission Date: 6/14/2017  Length of Stay: 1 days  Attending Physician: Juana Lyons MD  Primary Care Provider: Thomas Huertas MD        Subjective:     Principal Problem:Hyponatremia    HPI:  66 y.o. female that (in part)  has a past medical history of Asthma; Back problem; Calcaneal spur; Chicken pox (2015); Hypertension; Rheumatic disease; and Thyroid cancer. Presents to [facility] Emergency Department with complaint of shortness of breath and left ear pain.  She was seen by her primary care physician 5 days ago was started on Bactrim for a chronic left ear infection.  She states problems began 25 years ago when she was receiving radiation for thyroid cancer.  She is a very poor historian and makes mention that is a possible fungal infection.  Associated symptoms include increased thirst.  Denies fever or chills.  No radiation of pain.  Recurrent frequency.  Constant duration.  Not relieved with Bactrim.     As far as the Shortness of breath she deniesh,  hemoptysis, stridor, wheezing, or night sweats. she smoked half a pack of cigarettes for the last 15 years.       In the emergency department routine laboratory studies were obtained which demonstrated a serum sodium level of 116.  The most recent laboratory studies we have are from 2014 and this hyponatremia appears to be new.     Hospital medicine has been asked to admit for further evaluation and treatment.     Hospital Course:  No notes on file    Interval History: patient complains of sinus pressure and left ear pressure. Asking for phenergan + codeine and mucinex for this. Does not want a nasal spray. Sodium increasing appropriately with fluids. At goal for the first 24 hours.     Review of Systems   Constitutional: Negative.    HENT:        Ear congestion, sinus congestion   Eyes:  Negative.    Respiratory: Negative.    Cardiovascular: Negative.    Gastrointestinal: Negative.    Endocrine: Negative.    Genitourinary: Negative.    Musculoskeletal: Negative.    Skin: Negative.    Allergic/Immunologic: Negative.    Neurological: Negative.    Hematological: Negative.    Psychiatric/Behavioral: Negative.      Objective:     Vital Signs (Most Recent):  Temp: 98.6 °F (37 °C) (06/15/17 1100)  Pulse: 82 (06/15/17 1241)  Resp: 19 (06/15/17 1241)  BP: 101/63 (06/15/17 1100)  SpO2: 95 % (06/15/17 1241) Vital Signs (24h Range):  Temp:  [97.7 °F (36.5 °C)-98.6 °F (37 °C)] 98.6 °F (37 °C)  Pulse:  [68-89] 82  Resp:  [17-20] 19  SpO2:  [91 %-99 %] 95 %  BP: ()/(53-74) 101/63     Weight: 54.2 kg (119 lb 6.4 oz)  Body mass index is 20.49 kg/m².    Intake/Output Summary (Last 24 hours) at 06/15/17 1717  Last data filed at 06/15/17 0900   Gross per 24 hour   Intake             1854 ml   Output             2800 ml   Net             -946 ml      Physical Exam   Constitutional: She is oriented to person, place, and time. She appears well-developed. No distress.   HENT:   Right Ear: Hearing, tympanic membrane, external ear and ear canal normal.   Left Ear: External ear and ear canal normal. No lacerations. No drainage, swelling or tenderness. No foreign bodies. No mastoid tenderness. Tympanic membrane is perforated (old).  No middle ear effusion.   Eyes: Conjunctivae and EOM are normal.   Cardiovascular: Normal rate and regular rhythm.    Pulmonary/Chest: Effort normal and breath sounds normal.   Abdominal: Soft. Bowel sounds are normal.   Musculoskeletal: Normal range of motion. She exhibits no edema.   Lymphadenopathy:     She has no cervical adenopathy.   Neurological: She is alert and oriented to person, place, and time. No cranial nerve deficit. She exhibits normal muscle tone.   Skin: Skin is warm and dry. She is not diaphoretic.   Psychiatric:   Tangential   Nursing note and vitals  reviewed.      Significant Labs: All pertinent labs within the past 24 hours have been reviewed.    Significant Imaging: I have reviewed all pertinent imaging results/findings within the past 24 hours.  I have reviewed and interpreted all pertinent imaging results/findings within the past 24 hours.    Assessment/Plan:      * Hyponatremia    Hyponatremia possibly due to Bactrim as this is a known side effect. Currently at goal for the first 24 hours. Stop fluids and allow for PO intake. Continue to check sodium every 6 hours.             Tobacco abuse    Nicotine patch            Chronic otitis media of right ear with perforated tympanic membrane    Discontinue Bactrim.  No objective indication to continue antibiotics at this point. I personally looked in her ear with an otoscope and there is no sign of active infection. Has a perforated left TM. Has no lymphadenopathy  Follow-up with ENT as an outpatient          Essential hypertension    At goal. Continue current management.             History of thyroid cancer    Continue Synthroid. No acute issues            VTE Risk Mitigation         Ordered     enoxaparin injection 40 mg  Daily     Route:  Subcutaneous        06/14/17 2200     Medium Risk of VTE  Once      06/14/17 2200        Stop IVFs. Check sodium every 6 hours. Likely to discharge home tomorrow    Juana Cornejo MD  Department of Hospital Medicine   [facility]

## 2017-06-16 VITALS
HEART RATE: 90 BPM | TEMPERATURE: 100 F | SYSTOLIC BLOOD PRESSURE: 132 MMHG | DIASTOLIC BLOOD PRESSURE: 64 MMHG | HEIGHT: 64 IN | WEIGHT: 122 LBS | RESPIRATION RATE: 18 BRPM | BODY MASS INDEX: 20.83 KG/M2 | OXYGEN SATURATION: 90 %

## 2017-06-16 LAB
SODIUM SERPL-SCNC: 125 MMOL/L
SODIUM SERPL-SCNC: 125 MMOL/L

## 2017-06-16 PROCEDURE — 84295 ASSAY OF SERUM SODIUM: CPT

## 2017-06-16 PROCEDURE — 25000003 PHARM REV CODE 250: Performed by: EMERGENCY MEDICINE

## 2017-06-16 PROCEDURE — 25000242 PHARM REV CODE 250 ALT 637 W/ HCPCS: Performed by: HOSPITALIST

## 2017-06-16 PROCEDURE — 25000003 PHARM REV CODE 250: Performed by: INTERNAL MEDICINE

## 2017-06-16 PROCEDURE — 36415 COLL VENOUS BLD VENIPUNCTURE: CPT

## 2017-06-16 PROCEDURE — 94640 AIRWAY INHALATION TREATMENT: CPT

## 2017-06-16 PROCEDURE — 25000003 PHARM REV CODE 250: Performed by: HOSPITALIST

## 2017-06-16 PROCEDURE — 94761 N-INVAS EAR/PLS OXIMETRY MLT: CPT

## 2017-06-16 RX ORDER — AMLODIPINE BESYLATE 10 MG/1
10 TABLET ORAL DAILY
Qty: 30 TABLET | Refills: 0 | Status: SHIPPED | OUTPATIENT
Start: 2017-06-16 | End: 2019-01-01

## 2017-06-16 RX ORDER — ALBUTEROL SULFATE 90 UG/1
2 AEROSOL, METERED RESPIRATORY (INHALATION) EVERY 6 HOURS PRN
Qty: 18 G | Refills: 0 | Status: ON HOLD | OUTPATIENT
Start: 2017-06-16 | End: 2019-01-01 | Stop reason: HOSPADM

## 2017-06-16 RX ORDER — BUTALBITAL, ASPIRIN, AND CAFFEINE 325; 50; 40 MG/1; MG/1; MG/1
1 CAPSULE ORAL EVERY 4 HOURS PRN
Qty: 20 CAPSULE | Refills: 0 | Status: SHIPPED | OUTPATIENT
Start: 2017-06-16 | End: 2019-01-01

## 2017-06-16 RX ADMIN — IPRATROPIUM BROMIDE AND ALBUTEROL SULFATE 3 ML: .5; 3 SOLUTION RESPIRATORY (INHALATION) at 01:06

## 2017-06-16 RX ADMIN — POLYETHYLENE GLYCOL 3350 17 G: 17 POWDER, FOR SOLUTION ORAL at 08:06

## 2017-06-16 RX ADMIN — BUTALBITAL, ACETAMINOPHEN AND CAFFEINE 1 TABLET: 50; 325; 40 TABLET ORAL at 05:06

## 2017-06-16 RX ADMIN — GUAIFENESIN AND DEXTROMETHORPHAN HYDROBROMIDE 1 TABLET: 600; 30 TABLET, EXTENDED RELEASE ORAL at 08:06

## 2017-06-16 RX ADMIN — LEVOTHYROXINE SODIUM 125 MCG: 125 TABLET ORAL at 05:06

## 2017-06-16 RX ADMIN — BUTALBITAL, ACETAMINOPHEN AND CAFFEINE 1 TABLET: 50; 325; 40 TABLET ORAL at 09:06

## 2017-06-16 RX ADMIN — SODIUM CHLORIDE 1000 ML: 0.9 INJECTION, SOLUTION INTRAVENOUS at 07:06

## 2017-06-16 RX ADMIN — ASPIRIN 81 MG: 81 TABLET, COATED ORAL at 08:06

## 2017-06-16 RX ADMIN — AMLODIPINE BESYLATE 10 MG: 5 TABLET ORAL at 08:06

## 2017-06-16 RX ADMIN — ROSUVASTATIN CALCIUM 10 MG: 10 TABLET ORAL at 08:06

## 2017-06-16 NOTE — PROGRESS NOTES
PT  Care assumed, pt lying on lt side with eyes closed, pt arouses to touch. Pt with o2 in place at 2 liters n/c. No sob  Noted. telemetry monitor in place with alarms Pt also denies c/o of pain or any discomfort. Saline lock to rt hand site clear. Safety maintained .call bell for nurse is at the bedside. Pt in formed of plan of care for this pm.

## 2017-06-16 NOTE — PLAN OF CARE
Problem: Patient Care Overview  Goal: Plan of Care Review  Outcome: Ongoing (interventions implemented as appropriate)  No falls this shift bed kept in low position call light and phone remain within reach. Patient able to reposition self in bed without assist. No evidence of skin breakdown noted.      Patient complaining of sinus congestion, migraine headache and nausea no emesis noted. , Fioricet order q6hrs PRN, phenergan PO q6hrs and guaifenesin order q12hrs.       Admit Na+ 116 patient given 1L bolus and started on NS at 125 which was d/c about 1200 yesterday. Today Na+ 125. Patient given IL bolus of IV fluids.       Room air oxygen saturation 89-90% this shift, current smoker MD sherwood with room air oxygen saturation greater than 88%. Breath sounds diminished.   6/15/17 CXR: No acute cardiopulmonary disease.

## 2017-06-16 NOTE — PROGRESS NOTES
Written Healthcare Information:    Follow-up Information     Thomas Huertas MD On 6/22/2017.    Specialty:  Endocrinology  Why:  Keep appointment with Dr Huertas as previously scheduled.  Contact information:  [address] [place] [address] [place] [address]  [phone]                 [facility]  Unless otherwise directed by your provider, please   contact [facility], our nurse care line   that is available for 24/7 assistance.      [phone] (toll-free)      Registered nurses in the [facility]   provide: appointment scheduling, clinical advisement, health education, and other advisory services.    Thank you for choosing [facility] for your care.  Within 48-72 hours after leaving the hospital you will receive a call from [facility] nurses following up to see how you are doing.  The team will ask you a few questions and the call will last approximately 20 minutes.    Please answer any calls you may receive from [facility].  We want to continue to support you as you manage your healthcare needs.  [facility] is happy to have the opportunity to serve you.    Sincerely      Roz-  Your [facility]  [phone]

## 2017-06-16 NOTE — PROGRESS NOTES
Informed Dr. Cornejo patient does not feel comfortable  Leaving without and albuterol rescue  inhaler in hand. Patient room air oxygen saturation 90%. Informed patient we are not a pharmacist therefore, we can not dispense medication.

## 2017-06-16 NOTE — PLAN OF CARE
06/16/17 0939   Final Note   Assessment Type Final Discharge Note   Discharge Disposition Home   Discharge planning education complete? Yes   Hospital Follow Up  Appt(s) scheduled? Yes   Discharge plans and expectations educations in teach back method with documentation complete? Yes   Offered [facility] -- Bedside Delivery? n/a   Discharge/Hospital Encounter Summary to (non-[facility]) PCP No   Referral to Outpatient Case Management complete? No   Referral to / orders for Home Health Complete? No   30 day supply of medicines given at discharge, if documented non-compliance / non-adherence? No   Any social issues identified prior to discharge? No   Did you assess the readiness or willingness of the family or caregiver to support self management of care? Yes   Right Care Referral Info   Post Acute Recommendation No Care

## 2017-06-16 NOTE — PLAN OF CARE
has arranged all post discharge services, e.g., appts and education. OK for patient to d/c from Case Management standpoint.  Roz Gunter LMSW, [facility], [facility]  06/16/2017

## 2017-06-16 NOTE — PLAN OF CARE
Problem: Pain, Acute (Adult)  Intervention: Monitor/Manage Analgesia   06/16/17 0632   Safety Interventions   Medication Review/Management medications reviewed;high risk medications identified     Intervention: Mutually Develop/Implement Acute Pain Management Plan   06/16/17 0632   Pain/Comfort/Sleep Interventions   Pain Management Interventions around-the-clock dosing utilized;pain management plan reviewed with patient/caregiver   Cognitive Interventions   Sensory Stimulation Regulation care clustered;lighting decreased;quiet environment promoted     Intervention: Support/Optimize Psychosocial Response to Acute Pain   06/16/17 0632   Coping/Psychosocial Interventions   Supportive Measures active listening utilized;verbalization of feelings encouraged   Psychosocial Support   Trust Relationship/Rapport care explained;choices provided;emotional support provided;thoughts/feelings acknowledged;reassurance provided         Comments: Pt  C/o of pain times 2 this shift. Pt was given med for c/o of lower back pain.

## 2017-06-16 NOTE — PLAN OF CARE
06/16/17 0935   Discharge Assessment   Assessment Type Discharge Planning Assessment   Confirmed/corrected address and phone number on facesheet? Yes  ([address], [address], [place]  [id number])   Assessment information obtained from? Patient   Expected Length of Stay (days) 1   Communicated expected length of stay with patient/caregiver yes   If Healthcare Directive is received, is it scanned into Epic? no (comment)   Prior to hospitilization cognitive status: Alert/Oriented   Prior to hospitalization functional status: Independent   Current cognitive status: Alert/Oriented   Current Functional Status: Independent   Arrived From home or self-care   Lives With other (see comments)  (Lives with a friend)   Able to Return to Prior Arrangements yes   Is patient able to care for self after discharge? Yes   How many people do you have in your home that can help with your care after discharge? 0   Patient's perception of discharge disposition home or selfcare   Readmission Within The Last 30 Days no previous admission in last 30 days   Patient currently being followed by outpatient case management? No   Patient currently receives home health services? No   Does the patient currently use HME? No   Patient currently receives private duty nursing? No   Patient currently receives any other outside agency services? No   Equipment Currently Used at Home none   Do you have any problems affording any of your prescribed medications? No   Is the patient taking medications as prescribed? yes   Do you have any financial concerns preventing you from receiving the healthcare you need? No   Does the patient have transportation to healthcare appointments? Yes   Transportation Available car   On Dialysis? No   Does the patient receive services at the Coumadin Clinic? No   Are there any open cases? No   Discharge Plan A Home   Discharge Plan B Home   Patient/Family In Agreement With Plan yes

## 2017-06-18 NOTE — DISCHARGE SUMMARY
[facility] Hospital Medicine  Discharge Summary      Patient Name: Di Deluca  MRN: [id number]  Admission Date: 6/14/2017  Hospital Length of Stay: 2 days  Discharge Date and Time:  06/16/2017 6:10 PM  Attending Physician: No att. providers found   Discharging Provider: Juana Cornejo MD  Primary Care Provider: Thomas Huertas MD      HPI:   66 y.o. female that (in part)  has a past medical history of Asthma; Back problem; Calcaneal spur; Chicken pox (2015); Hypertension; Rheumatic disease; and Thyroid cancer. Presents to [facility] Emergency Department with complaint of shortness of breath and left ear pain.  She was seen by her primary care physician 5 days ago was started on Bactrim for a chronic left ear infection.  She states problems began 25 years ago when she was receiving radiation for thyroid cancer.  She is a very poor historian and makes mention that is a possible fungal infection.  Associated symptoms include increased thirst.  Denies fever or chills.  No radiation of pain.  Recurrent frequency.  Constant duration.  Not relieved with Bactrim.     As far as the Shortness of breath she deniesh,  hemoptysis, stridor, wheezing, or night sweats. she smoked half a pack of cigarettes for the last 15 years.       In the emergency department routine laboratory studies were obtained which demonstrated a serum sodium level of 116.  The most recent laboratory studies we have are from 2014 and this hyponatremia appears to be new.     Hospital medicine has been asked to admit for further evaluation and treatment.     * No surgery found *      Indwelling Lines/Drains at time of discharge:   Lines/Drains/Airways          No matching active lines, drains, or airways        Hospital Course:   Ms Deluca presents with severe hyponatremia (116) that was surprisingly not symptomatic. Patient constantly reports ear fullness and possible infection. Also reports nasal congestion but  "declines nasal sprays and rather asked for phenergan-codeine plus fioricet as treatment for this. Mucinex daily, but not sure how often, possibly excessively. Stated she takes fioricet every day and recently started bactrim for "ear infection". Stated seen an ear specialist who is a neurosurgeon. Is an active cigarette smoker. Also reported increased thirst as she has a fungal infection, but didn't report increased water intake. Hyponatremia improved with IVFs. Increased 12-14 mEq within 24 hour period. Good appetite and PO intake. Highest sodium 125 prior to discharge. Again, patient had good appetite and PO intake, and was not symptomatic. Gave one more liter of IVFs prior to discharge. Bilateral ear examination with otoscope revealed no erythema, edema nor discharge in either ear. Has a perforated tympanic membrane in left ear (which patient reported being chronic). No lymphadenopathy. No loss of balance (ambulated without assistance). Advised patient to stop bactrim and to see an ENT within the next 7 days. Insisted she will she her PCP and "neurosurgeon".  Has a follow up already scheduled with Endocrinology. Needs a follow up BMP to ensure resolution of hyponatremia. Acitvity as tolerated. Reassurance about no ear infection. Needs to quit smoking.          Pending Diagnostic Studies:     None        Final Active Diagnoses:    Diagnosis Date Noted POA    PRINCIPAL PROBLEM:  Hyponatremia [E87.1] 06/14/2017 Yes    Essential hypertension [I10] 06/14/2017 Yes    Chronic otitis media of right ear with perforated tympanic membrane [H66.91, H72.91] 06/14/2017 Yes    Tobacco abuse [Z72.0] 06/14/2017 Yes    History of thyroid cancer [Z85.850] 07/28/2014 Not Applicable      Problems Resolved During this Admission:    Diagnosis Date Noted Date Resolved POA      Discharged Condition: good    Disposition: Home or Self Care    Follow Up:  Follow-up Information     Thomas Huertas MD On 6/22/2017.    Specialty:  " Endocrinology  Why:  Keep appointment with Dr Huertas as previously scheduled.  Contact information:  [address] [place] [address] [id number] [place] [id number] [phone]                 Patient Instructions:   No discharge procedures on file.  Medications:  Reconciled Home Medications:   Discharge Medication List as of 6/16/2017 11:12 AM      CONTINUE these medications which have CHANGED    Details   albuterol 90 mcg/actuation inhaler Inhale 2 puffs into the lungs every 6 (six) hours as needed for Wheezing. Rescue, Starting Fri 6/16/2017, Until Sat 6/16/2018, Print      amlodipine (NORVASC) 10 MG tablet Take 1 tablet (10 mg total) by mouth once daily., Starting Fri 6/16/2017, Print      butalbital-aspirin-caffeine -40 mg (FIORINAL) -40 mg Cap Take 1 capsule by mouth every 4 (four) hours as needed., Starting Fri 6/16/2017, Print         CONTINUE these medications which have NOT CHANGED    Details   clotrimazole (LOTRIMIN) 1 % cream Apply to affected area 2 times daily, Print      desonide (DESOWEN) 0.05 % cream Apply topically 2 (two) times daily., Starting 9/7/2015, Until Thu 9/17/15, Print      hydrOXYzine HCl (ATARAX) 25 MG tablet Take 1 tablet (25 mg total) by mouth every 6 (six) hours., Starting 9/25/2015, Until Discontinued, Print      levothyroxine (SYNTHROID) 125 MCG tablet Take 125 mcg by mouth once daily., Until Discontinued, Historical Med      rosuvastatin (CRESTOR) 10 MG tablet Take 10 mg by mouth once daily., Until Discontinued, Historical Med         STOP taking these medications       aspirin 325 MG tablet Comments:   Reason for Stopping:         cephALEXin (KEFLEX) 500 MG capsule Comments:   Reason for Stopping:         naproxen sodium (ANAPROX) 220 MG tablet Comments:   Reason for Stopping:         oxycodone-acetaminophen (PERCOCET) 5-325 mg per tablet Comments:   Reason for Stopping:         valacyclovir (VALTREX) 1000 MG tablet Comments:   Reason for Stopping:             Time spent on  the discharge of patient: > 35 minutes    Juana Cornejo MD  Department of Hospital Medicine  [facility]

## 2017-06-18 NOTE — HOSPITAL COURSE
"Ms Deluca presents with severe hyponatremia (116) that was surprisingly not symptomatic. Patient constantly reports ear fullness and possible infection. Also reports nasal congestion but declines nasal sprays and rather asked for phenergan-codeine plus fioricet as treatment for this. Mucinex daily, but not sure how often, possibly excessively. Stated she takes fioricet every day and recently started bactrim for "ear infection". Stated seen an ear specialist who is a neurosurgeon. Is an active cigarette smoker. Also reported increased thirst as she has a fungal infection, but didn't report increased water intake. Hyponatremia improved with IVFs. Increased 12-14 mEq within 24 hour period. Good appetite and PO intake. Highest sodium 125 prior to discharge. Again, patient had good appetite and PO intake, and was not symptomatic. Gave one more liter of IVFs prior to discharge. Bilateral ear examination with otoscope revealed no erythema, edema nor discharge in either ear. Has a perforated tympanic membrane in left ear (which patient reported being chronic). No lymphadenopathy. No loss of balance (ambulated without assistance). Advised patient to stop bactrim and to see an ENT within the next 7 days. Insisted she will she her PCP and "neurosurgeon".  Needs a follow up BMP to ensure resolution of hyponatremia. Acitvity as tolerated. Reassurance about no ear infection. Needs to quit smoking.   "

## 2018-03-01 PROCEDURE — 99283 EMERGENCY DEPT VISIT LOW MDM: CPT

## 2018-03-02 ENCOUNTER — HOSPITAL ENCOUNTER (EMERGENCY)
Facility: HOSPITAL | Age: 68
Discharge: HOME | End: 2018-03-02
Attending: EMERGENCY MEDICINE
Payer: MEDICARE

## 2018-03-02 VITALS
TEMPERATURE: 98 F | RESPIRATION RATE: 20 BRPM | HEART RATE: 68 BPM | OXYGEN SATURATION: 95 % | BODY MASS INDEX: 21.17 KG/M2 | SYSTOLIC BLOOD PRESSURE: 120 MMHG | WEIGHT: 124 LBS | HEIGHT: 64 IN | DIASTOLIC BLOOD PRESSURE: 64 MMHG

## 2018-03-02 DIAGNOSIS — H92.02 LEFT EAR PAIN: Primary | ICD-10-CM

## 2018-03-02 DIAGNOSIS — H66.92 LEFT OTITIS MEDIA, UNSPECIFIED OTITIS MEDIA TYPE: ICD-10-CM

## 2018-03-02 RX ORDER — CEPHALEXIN 500 MG/1
500 CAPSULE ORAL 4 TIMES DAILY
Qty: 28 CAPSULE | Refills: 0 | Status: SHIPPED | OUTPATIENT
Start: 2018-03-02 | End: 2018-03-09

## 2018-03-02 RX ORDER — CEPHALEXIN 500 MG/1
500 CAPSULE ORAL 4 TIMES DAILY
Qty: 28 CAPSULE | Refills: 0 | Status: SHIPPED | OUTPATIENT
Start: 2018-03-02 | End: 2018-03-02

## 2018-03-02 RX ORDER — OXYCODONE AND ACETAMINOPHEN 5; 325 MG/1; MG/1
1 TABLET ORAL EVERY 4 HOURS PRN
Qty: 14 TABLET | Refills: 0 | Status: SHIPPED | OUTPATIENT
Start: 2018-03-02 | End: 2018-05-17

## 2018-03-02 NOTE — DISCHARGE INSTRUCTIONS
Additional instructions  Followup with your primary care physician in 2-3 days if you are not improving. Take all your medications as prescribed. Return to the emergency department if you have increasing pain, chest pain, difficulty breathing,  nonstop vomiting, or any other concerns. Be sure to drink plenty of fluids to stay hydrated. Get plenty of rest. Please refer to additional educational material for further instructions.

## 2018-03-02 NOTE — ED PROVIDER NOTES
Encounter Date: 3/1/2018    SCRIBE #1 NOTE: I, Jose F Cholo, am scribing for, and in the presence of,  Dr. Matthew . I have scribed the entire note.     I, Dr. El Matthew MD, personally performed the services described in this documentation. All medical record entries made by the scribe were at my direction and in my presence.  I have reviewed the chart and agree that the record reflects my personal performance and is accurate and complete. El Matthew MD.    History     Chief Complaint   Patient presents with    Otalgia     ear pain x3 days     CHIEF COMPLAINT: Patient presents with:  Otalgia: ear pain x3 days      HISTORY OF PRESENT ILLNESS: Di Deluca who is a 67 y.o. presents to the emergency department today with complaint of left ear pain onset x 8 days. Pt saw PCP at onset and was prescribed Ciprodex and a cough medication. She states Ciprodex has not improved pain. No drainage. No pain to the pinna. No mastoid pain. No fever. She reports no other medical complaints.       ALLERGIES REVIEWED  MEDICATIONS REVIEWED  PMH/PSH/SOC/FH REVIEWED     The history is provided by the patient.    Nursing/Ancillary staff note reviewed.          The history is provided by the patient.     Review of patient's allergies indicates:   Allergen Reactions    Benzodiazepines     Lidocaine     Toradol [ketorolac]     Tramadol      Past Medical History:   Diagnosis Date    Asthma     Back problem     Calcaneal spur     Chicken pox 2015    Hypertension     Rheumatic disease     Thyroid cancer      Past Surgical History:   Procedure Laterality Date    HYSTERECTOMY      left knee      THYROIDECTOMY      TONSILLECTOMY       Family History   Problem Relation Age of Onset    Depression Mother     Hypertension Sister     Blindness Neg Hx     Glaucoma Neg Hx     Macular degeneration Neg Hx     Retinal detachment Neg Hx      Social History   Substance Use Topics    Smoking status: Current Every Day Smoker      Packs/day: 0.50     Years: 15.00     Types: Cigarettes    Smokeless tobacco: Never Used    Alcohol use No     Review of Systems   Constitutional: Negative for activity change, appetite change, chills, diaphoresis and fever.   HENT: Positive for ear pain (left). Negative for congestion, drooling, mouth sores, rhinorrhea, sinus pain, sore throat and trouble swallowing.    Eyes: Negative for pain and discharge.   Respiratory: Negative for cough, chest tightness, shortness of breath, wheezing and stridor.    Cardiovascular: Negative for chest pain, palpitations and leg swelling.   Gastrointestinal: Negative for abdominal distention, abdominal pain, blood in stool, constipation, diarrhea, nausea and vomiting.   Genitourinary: Negative for difficulty urinating, dysuria, flank pain, frequency, hematuria and urgency.   Musculoskeletal: Negative for arthralgias, back pain and myalgias.   Skin: Negative for pallor, rash and wound.   Neurological: Negative for dizziness, syncope, weakness, light-headedness and numbness.   All other systems reviewed and are negative.      Physical Exam     Initial Vitals [03/01/18 2309]   BP Pulse Resp Temp SpO2   119/65 91 20 98.3 °F (36.8 °C) 97 %      MAP       83         Physical Exam    Nursing note and vitals reviewed.  Constitutional: She appears well-developed and well-nourished.   HENT:   Head: Normocephalic and atraumatic.   Right Ear: Tympanic membrane and external ear normal.   Left Ear: External ear normal. Tympanic membrane is injected, erythematous and bulging.   Nose: Nose normal.   Mouth/Throat: Oropharynx is clear and moist.   + bulging membrane left ear, No rupture.    Eyes: Conjunctivae and EOM are normal. Pupils are equal, round, and reactive to light. No scleral icterus.   Neck: Normal range of motion. Neck supple. No JVD present.   Cardiovascular: Normal rate, regular rhythm, normal heart sounds and intact distal pulses. Exam reveals no gallop and no friction rub.     No murmur heard.  Pulmonary/Chest: Breath sounds normal. No stridor. No respiratory distress. She has no wheezes. She exhibits no tenderness.   Abdominal: Soft. Bowel sounds are normal. She exhibits no distension and no mass. There is no tenderness. There is no rebound and no guarding.   Musculoskeletal: Normal range of motion. She exhibits no edema or tenderness.   Back is nontender to palpation.    Neurological: She is alert and oriented to person, place, and time. She has normal strength. No cranial nerve deficit.   Skin: Skin is warm and dry. Capillary refill takes less than 2 seconds. No rash noted. No pallor.   Psychiatric: She has a normal mood and affect. Thought content normal.         ED Course   Procedures  Labs Reviewed - No data to display          Medical Decision Making:   Initial Assessment:   Di Deluca 67 y.o. presents to the ED today with left ear pain x 8 days.     Differential Diagnosis:   Otalgia, foreign body, cerumen impaction, otitis externa, otitis media, mastoiditis      ED Management:  Pt will be discharged home with prescriptions for Keflex and Percocet and will follow up with ENT.     After taking into careful account the historical factors and physical exam findings of the patient's presentation today, in conjunction with the empirical and objective data obtained on ED workup, no acute emergent medical condition has been identified. The patient appears to be low risk for an emergent medical condition and I feel it is safe and appropriate at this time for the patient to be discharged to follow-up as detailed in their discharge instructions for reevaluation and possible continued outpatient workup and management. I have discussed the specifics of the workup with the patient and the patient has verbalized understanding of the details of the workup, the diagnosis, the treatment plan, and the need for outpatient follow-up.  Although the patient has no emergent etiology today this does  not preclude the development of an emergent condition so in addition, I have advised the patient that they can return to the ED and/or activate EMS at any time with worsening of their symptoms, change of their symptoms, or with any other medical complaint.  The patient remained comfortable and stable during their visit in the ED.  Discharge and follow-up instructions discussed with the patient who expressed understanding and willingness to comply with my recommendations.                     Scribe Attestation:   Scribe #1: I performed the above scribed service and the documentation accurately describes the services I performed. I attest to the accuracy of the note.               Clinical Impression:     1. Left ear pain    2. Left otitis media, unspecified otitis media type                                 El Matthew MD  03/29/18 1681

## 2018-05-17 ENCOUNTER — HOSPITAL ENCOUNTER (EMERGENCY)
Facility: HOSPITAL | Age: 68
Discharge: HOME | End: 2018-05-18
Attending: INTERNAL MEDICINE
Payer: MEDICARE

## 2018-05-17 VITALS
TEMPERATURE: 98 F | OXYGEN SATURATION: 92 % | WEIGHT: 124 LBS | HEART RATE: 98 BPM | DIASTOLIC BLOOD PRESSURE: 76 MMHG | HEIGHT: 64 IN | RESPIRATION RATE: 18 BRPM | SYSTOLIC BLOOD PRESSURE: 133 MMHG | BODY MASS INDEX: 21.17 KG/M2

## 2018-05-17 DIAGNOSIS — R52 PAIN: ICD-10-CM

## 2018-05-17 DIAGNOSIS — Z78.9: ICD-10-CM

## 2018-05-17 DIAGNOSIS — S39.012A STRAIN OF LUMBAR REGION, INITIAL ENCOUNTER: Primary | ICD-10-CM

## 2018-05-17 PROCEDURE — 72100 X-RAY EXAM L-S SPINE 2/3 VWS: CPT | Mod: TC,FY

## 2018-05-17 PROCEDURE — 72100 X-RAY EXAM L-S SPINE 2/3 VWS: CPT | Mod: 26,,, | Performed by: RADIOLOGY

## 2018-05-17 PROCEDURE — 72070 X-RAY EXAM THORAC SPINE 2VWS: CPT | Mod: 26,,, | Performed by: RADIOLOGY

## 2018-05-17 PROCEDURE — 99283 EMERGENCY DEPT VISIT LOW MDM: CPT

## 2018-05-17 PROCEDURE — 72070 X-RAY EXAM THORAC SPINE 2VWS: CPT | Mod: TC,FY

## 2018-05-17 PROCEDURE — 25000003 PHARM REV CODE 250: Performed by: INTERNAL MEDICINE

## 2018-05-17 RX ORDER — HYDROCODONE BITARTRATE AND ACETAMINOPHEN 5; 325 MG/1; MG/1
1 TABLET ORAL
Status: COMPLETED | OUTPATIENT
Start: 2018-05-17 | End: 2018-05-17

## 2018-05-17 RX ORDER — TIZANIDINE 4 MG/1
4 TABLET ORAL EVERY 6 HOURS PRN
Qty: 30 TABLET | Refills: 0 | Status: SHIPPED | OUTPATIENT
Start: 2018-05-17 | End: 2018-05-27

## 2018-05-17 RX ORDER — LISINOPRIL 10 MG/1
40 TABLET ORAL DAILY
Status: ON HOLD | COMMUNITY
End: 2019-01-01 | Stop reason: HOSPADM

## 2018-05-17 RX ORDER — ACETAMINOPHEN AND CODEINE PHOSPHATE 300; 30 MG/1; MG/1
TABLET ORAL
Status: ON HOLD | COMMUNITY
End: 2019-01-01 | Stop reason: HOSPADM

## 2018-05-17 RX ORDER — NEOMYCIN SULFATE, POLYMYXIN B SULFATE AND HYDROCORTISONE 10; 3.5; 1 MG/ML; MG/ML; [USP'U]/ML
3 SUSPENSION/ DROPS AURICULAR (OTIC)
Status: COMPLETED | OUTPATIENT
Start: 2018-05-17 | End: 2018-05-17

## 2018-05-17 RX ADMIN — NEOMYCIN SULFATE, POLYMYXIN B SULFATE AND HYDROCORTISONE 3 DROP: 10; 3.5; 1 SUSPENSION/ DROPS AURICULAR (OTIC) at 11:05

## 2018-05-17 RX ADMIN — HYDROCODONE BITARTRATE AND ACETAMINOPHEN 1 TABLET: 5; 325 TABLET ORAL at 11:05

## 2018-05-18 NOTE — ED TRIAGE NOTES
Pt reports lower back pain since moving furniture and boxes around today.  Pt reports taking fioricet and motrin for pain without relief.  Pt reports left ear pain starting today.  Pt reports radiation ablation in neck that effects ear from time to time.  Pt requests no prednisone due to side effects.

## 2018-05-18 NOTE — ED PROVIDER NOTES
Encounter Date: 5/17/2018       History     Chief Complaint   Patient presents with    Back Pain    Otalgia     Patient claims hurt back while moving boxes while cleaning a room in [place] Saturday. States pain worse today. Drove over from [facility], came   Here after completing some local business. Needs some pain meds.           Review of patient's allergies indicates:   Allergen Reactions    Benzodiazepines     Lidocaine     Toradol [ketorolac]     Tramadol      Past Medical History:   Diagnosis Date    Asthma     Back problem     Calcaneal spur     Chicken pox 2015    Hypertension     Rheumatic disease     Thyroid cancer      Past Surgical History:   Procedure Laterality Date    HYSTERECTOMY      left knee      THYROIDECTOMY      TONSILLECTOMY       Family History   Problem Relation Age of Onset    Depression Mother     Hypertension Sister     Blindness Neg Hx     Glaucoma Neg Hx     Macular degeneration Neg Hx     Retinal detachment Neg Hx      Social History   Substance Use Topics    Smoking status: Current Every Day Smoker     Packs/day: 0.50     Years: 15.00     Types: Cigarettes    Smokeless tobacco: Never Used    Alcohol use No     Review of Systems   Constitutional: Positive for fatigue. Negative for fever.   HENT: Negative for sore throat.    Respiratory: Negative for shortness of breath.    Cardiovascular: Negative for chest pain.   Gastrointestinal: Negative for nausea.   Genitourinary: Negative for dysuria.   Musculoskeletal: Positive for arthralgias, back pain, myalgias and neck pain.   Skin: Negative for rash.   Neurological: Negative for weakness.   Hematological: Does not bruise/bleed easily.   All other systems reviewed and are negative.      Physical Exam     Initial Vitals [05/17/18 2231]   BP Pulse Resp Temp SpO2   133/76 98 18 98.3 °F (36.8 °C) (!) 92 %      MAP       95         Physical Exam    Nursing note and vitals reviewed.  Constitutional: Vital signs are normal.  She appears well-developed and well-nourished. She is active and cooperative.   HENT:   Head: Normocephalic and atraumatic.   Eyes: Conjunctivae and lids are normal. Lids are everted and swept, no foreign bodies found.   Neck: Trachea normal, normal range of motion and full passive range of motion without pain. Neck supple.   Cardiovascular: Normal rate, regular rhythm, S1 normal, S2 normal, normal heart sounds, intact distal pulses and normal pulses.  No extrasystoles are present.   Abdominal: Soft. Normal appearance and bowel sounds are normal.   Musculoskeletal:        Lumbar back: She exhibits decreased range of motion, tenderness, bony tenderness, deformity, pain and spasm.        Back:    Neurological: She is alert. She has normal reflexes. GCS eye subscore is 4. GCS verbal subscore is 5. GCS motor subscore is 6.   Skin: Skin is warm, dry and intact. Capillary refill takes less than 2 seconds.   Psychiatric: She has a normal mood and affect. Her speech is normal and behavior is normal. Cognition and memory are normal.         ED Course   Procedures  Labs Reviewed - No data to display       X-Rays:   Independently Interpreted Readings:   Other Readings:  No fractures noted. 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 ASCVD noted    Medical Decision Making:   Clinical Tests:   Radiological Study: Ordered and Reviewed  ED Management:  Reviewed findings with patient and pain medication use. Did  search, found tylenol #3 60 tabs on . Discussed opiate use and new laws that prevent Rx with multiple pharmacies and multiple providers. Will give Rx for Tizanidine                      Clinical Impression:   The primary encounter diagnosis was Strain of lumbar region, initial encounter. Diagnoses of 10 minute Apgar score 10 and Pain were also pertinent to this visit.    Disposition:   Disposition: Discharged  Condition: Stable                        Manohar Jay MD  18 [id number]       Manohar Jay MD  18 0016

## 2018-09-07 ENCOUNTER — HOSPITAL ENCOUNTER (EMERGENCY)
Facility: HOSPITAL | Age: 68
Discharge: HOME | End: 2018-09-07
Attending: FAMILY MEDICINE
Payer: MEDICARE

## 2018-09-07 VITALS
OXYGEN SATURATION: 95 % | HEIGHT: 64 IN | RESPIRATION RATE: 20 BRPM | BODY MASS INDEX: 19.63 KG/M2 | TEMPERATURE: 99 F | WEIGHT: 115 LBS | HEART RATE: 87 BPM

## 2018-09-07 DIAGNOSIS — J01.90 ACUTE NON-RECURRENT SINUSITIS, UNSPECIFIED LOCATION: ICD-10-CM

## 2018-09-07 DIAGNOSIS — H66.92 LEFT OTITIS MEDIA, UNSPECIFIED OTITIS MEDIA TYPE: Primary | ICD-10-CM

## 2018-09-07 PROCEDURE — 99283 EMERGENCY DEPT VISIT LOW MDM: CPT

## 2018-09-07 RX ORDER — AMOXICILLIN AND CLAVULANATE POTASSIUM 875; 125 MG/1; MG/1
1 TABLET, FILM COATED ORAL 2 TIMES DAILY
Qty: 20 TABLET | Refills: 0 | Status: SHIPPED | OUTPATIENT
Start: 2018-09-07 | End: 2018-09-17

## 2018-09-07 NOTE — ED PROVIDER NOTES
"Encounter Date: 9/7/2018       History     Chief Complaint   Patient presents with    Otalgia     Complaint of pain to the left ear.     Sore Throat    Facial Pain     Compaint of "sinus pressure"     Di Deluca is a 66 y.o female who presents to the ED with complaint of sinus symptoms, sore throat and left ear pain x 1 weeks despite Flonase and Mucincex  Patient with hx of thyroid cancer in 1985. Patient reports ablasion damaged her eustachian tube resulting in frequent left ear pain and infections.  Patient reports thick yellow nasal dischage.  Afebrile            Review of patient's allergies indicates:   Allergen Reactions    Benzodiazepines     Lidocaine     Toradol [ketorolac]     Tramadol      Past Medical History:   Diagnosis Date    Asthma     Back problem     Calcaneal spur     Chicken pox 2015    Hypertension     Rheumatic disease     Thyroid cancer      Past Surgical History:   Procedure Laterality Date    HYSTERECTOMY      left knee      THYROIDECTOMY      TONSILLECTOMY       Family History   Problem Relation Age of Onset    Depression Mother     Hypertension Sister     Blindness Neg Hx     Glaucoma Neg Hx     Macular degeneration Neg Hx     Retinal detachment Neg Hx      Social History     Tobacco Use    Smoking status: Current Every Day Smoker     Packs/day: 0.50     Years: 15.00     Pack years: 7.50     Types: Cigarettes    Smokeless tobacco: Never Used   Substance Use Topics    Alcohol use: No    Drug use: No     Review of Systems   Constitutional: Negative for fever.   HENT: Positive for congestion, ear pain, postnasal drip, sinus pressure and sinus pain. Negative for sore throat.    Respiratory: Negative for shortness of breath.    Cardiovascular: Negative for chest pain.   Gastrointestinal: Negative for nausea.   Genitourinary: Negative for dysuria.   Musculoskeletal: Negative for back pain.   Skin: Negative for rash.   Neurological: Negative for weakness. "   Hematological: Does not bruise/bleed easily.   All other systems reviewed and are negative.      Physical Exam     Initial Vitals [09/07/18 1130]   BP Pulse Resp Temp SpO2   -- 87 20 98.5 °F (36.9 °C) 95 %      MAP       --         Physical Exam    HENT:   Right Ear: Hearing, tympanic membrane, external ear and ear canal normal.   Left Ear: External ear and ear canal normal. Tympanic membrane is erythematous. A middle ear effusion is present.   Nose: Left sinus exhibits maxillary sinus tenderness and frontal sinus tenderness.   Mouth/Throat: Mucous membranes are normal. Posterior oropharyngeal erythema present.         ED Course   Procedures  Labs Reviewed - No data to display       Imaging Results    None          Medical Decision Making:   Initial Assessment:   Sinus pressure and tenderness  Sore throat  Left ear pain  Differential Diagnosis:   Otitis media  Viral URI  Sinusitis  ED Management:  Discussed physical exam findings with patient  No acute emergent medication condition identified at this time to warrant further testing/diagnostics.  Plan to discharge patient home with instructions to follow-up with PCP in 2-5 days or return to ED if symptoms worsen.  Patient verbalized agreement to discharge treatment plan.                        Clinical Impression:   The primary encounter diagnosis was Left otitis media, unspecified otitis media type. A diagnosis of Acute non-recurrent sinusitis, unspecified location was also pertinent to this visit.                             Valencia Francis NP  09/07/18 [id number]

## 2019-01-01 ENCOUNTER — HOSPITAL ENCOUNTER (INPATIENT)
Facility: HOSPITAL | Age: 69
LOS: 3 days | Discharge: HOME | DRG: 189 | End: 2019-06-17
Attending: SURGERY | Admitting: INTERNAL MEDICINE
Payer: MEDICARE

## 2019-01-01 ENCOUNTER — HOSPITAL ENCOUNTER (EMERGENCY)
Facility: HOSPITAL | Age: 69
Discharge: HOME | End: 2019-07-08
Attending: SURGERY
Payer: MEDICARE

## 2019-01-01 ENCOUNTER — HOSPITAL ENCOUNTER (EMERGENCY)
Facility: HOSPITAL | Age: 69
Discharge: HOME | End: 2019-06-22
Attending: SURGERY
Payer: COMMERCIAL

## 2019-01-01 ENCOUNTER — HOSPITAL ENCOUNTER (EMERGENCY)
Facility: HOSPITAL | Age: 69
Discharge: HOME | End: 2019-07-03
Attending: SURGERY
Payer: MEDICARE

## 2019-01-01 ENCOUNTER — HOSPITAL ENCOUNTER (INPATIENT)
Facility: HOSPITAL | Age: 69
LOS: 12 days | Discharge: NURSING, REHAB OR LONG-TERM CARE | DRG: 190 | End: 2019-07-24
Attending: SURGERY | Admitting: INTERNAL MEDICINE
Payer: MEDICARE

## 2019-01-01 VITALS
TEMPERATURE: 97 F | DIASTOLIC BLOOD PRESSURE: 72 MMHG | SYSTOLIC BLOOD PRESSURE: 132 MMHG | HEART RATE: 84 BPM | BODY MASS INDEX: 18.88 KG/M2 | WEIGHT: 110 LBS | RESPIRATION RATE: 16 BRPM | OXYGEN SATURATION: 98 %

## 2019-01-01 VITALS
OXYGEN SATURATION: 96 % | WEIGHT: 107 LBS | TEMPERATURE: 97 F | DIASTOLIC BLOOD PRESSURE: 68 MMHG | BODY MASS INDEX: 18.37 KG/M2 | HEART RATE: 70 BPM | SYSTOLIC BLOOD PRESSURE: 117 MMHG | RESPIRATION RATE: 18 BRPM

## 2019-01-01 VITALS
HEART RATE: 86 BPM | OXYGEN SATURATION: 99 % | RESPIRATION RATE: 18 BRPM | WEIGHT: 105 LBS | SYSTOLIC BLOOD PRESSURE: 133 MMHG | HEIGHT: 64 IN | DIASTOLIC BLOOD PRESSURE: 76 MMHG | BODY MASS INDEX: 17.93 KG/M2 | TEMPERATURE: 98 F

## 2019-01-01 VITALS
DIASTOLIC BLOOD PRESSURE: 65 MMHG | HEIGHT: 64 IN | BODY MASS INDEX: 18.27 KG/M2 | HEART RATE: 85 BPM | WEIGHT: 107 LBS | SYSTOLIC BLOOD PRESSURE: 137 MMHG | OXYGEN SATURATION: 95 % | TEMPERATURE: 97 F | RESPIRATION RATE: 22 BRPM

## 2019-01-01 VITALS
SYSTOLIC BLOOD PRESSURE: 133 MMHG | HEART RATE: 86 BPM | DIASTOLIC BLOOD PRESSURE: 71 MMHG | TEMPERATURE: 99 F | RESPIRATION RATE: 18 BRPM

## 2019-01-01 DIAGNOSIS — R06.00 DYSPNEA: ICD-10-CM

## 2019-01-01 DIAGNOSIS — L29.9 ITCHING: ICD-10-CM

## 2019-01-01 DIAGNOSIS — R21 RASH: ICD-10-CM

## 2019-01-01 DIAGNOSIS — R06.02 SOB (SHORTNESS OF BREATH): ICD-10-CM

## 2019-01-01 DIAGNOSIS — J44.1 COPD EXACERBATION: ICD-10-CM

## 2019-01-01 DIAGNOSIS — R53.81 DEBILITY: ICD-10-CM

## 2019-01-01 DIAGNOSIS — S62.102A CLOSED FRACTURE OF LEFT WRIST, INITIAL ENCOUNTER: Primary | ICD-10-CM

## 2019-01-01 DIAGNOSIS — J44.1 COPD EXACERBATION: Primary | ICD-10-CM

## 2019-01-01 DIAGNOSIS — J44.9 CHRONIC OBSTRUCTIVE PULMONARY DISEASE, UNSPECIFIED COPD TYPE: Primary | ICD-10-CM

## 2019-01-01 DIAGNOSIS — J96.11 CHRONIC RESPIRATORY FAILURE WITH HYPOXIA: ICD-10-CM

## 2019-01-01 DIAGNOSIS — Z20.7 SCABIES EXPOSURE: Primary | ICD-10-CM

## 2019-01-01 DIAGNOSIS — M79.602 LEFT ARM PAIN: ICD-10-CM

## 2019-01-01 LAB
ALBUMIN SERPL BCP-MCNC: 3.2 G/DL (ref 3.5–5.2)
ALBUMIN SERPL BCP-MCNC: 3.2 G/DL (ref 3.5–5.2)
ALBUMIN SERPL BCP-MCNC: 3.5 G/DL (ref 3.5–5.2)
ALBUMIN SERPL BCP-MCNC: 3.5 G/DL (ref 3.5–5.2)
ALBUMIN SERPL BCP-MCNC: 3.6 G/DL (ref 3.5–5.2)
ALBUMIN SERPL BCP-MCNC: 3.7 G/DL (ref 3.5–5.2)
ALBUMIN SERPL BCP-MCNC: 4 G/DL (ref 3.5–5.2)
ALLENS TEST: ABNORMAL
ALP SERPL-CCNC: 65 U/L (ref 55–135)
ALP SERPL-CCNC: 67 U/L (ref 55–135)
ALP SERPL-CCNC: 72 U/L (ref 55–135)
ALP SERPL-CCNC: 73 U/L (ref 55–135)
ALP SERPL-CCNC: 79 U/L (ref 55–135)
ALP SERPL-CCNC: 83 U/L (ref 55–135)
ALP SERPL-CCNC: 96 U/L (ref 55–135)
ALT SERPL W/O P-5'-P-CCNC: 17 U/L (ref 10–44)
ALT SERPL W/O P-5'-P-CCNC: 17 U/L (ref 10–44)
ALT SERPL W/O P-5'-P-CCNC: 19 U/L (ref 10–44)
ALT SERPL W/O P-5'-P-CCNC: 22 U/L (ref 10–44)
ALT SERPL W/O P-5'-P-CCNC: 37 U/L (ref 10–44)
AMPHET+METHAMPHET UR QL: NEGATIVE
AMPHET+METHAMPHET UR QL: NEGATIVE
ANION GAP SERPL CALC-SCNC: 10 MMOL/L (ref 8–16)
ANION GAP SERPL CALC-SCNC: 11 MMOL/L (ref 8–16)
ANION GAP SERPL CALC-SCNC: 4 MMOL/L (ref 8–16)
ANION GAP SERPL CALC-SCNC: 7 MMOL/L (ref 8–16)
ANION GAP SERPL CALC-SCNC: 8 MMOL/L (ref 8–16)
ANION GAP SERPL CALC-SCNC: 8 MMOL/L (ref 8–16)
ANION GAP SERPL CALC-SCNC: 9 MMOL/L (ref 8–16)
ANION GAP SERPL CALC-SCNC: 9 MMOL/L (ref 8–16)
APTT BLDCRRT: 30 SEC (ref 21–32)
APTT BLDCRRT: 32.8 SEC (ref 21–32)
AST SERPL-CCNC: 17 U/L (ref 10–40)
AST SERPL-CCNC: 21 U/L (ref 10–40)
AST SERPL-CCNC: 22 U/L (ref 10–40)
AST SERPL-CCNC: 22 U/L (ref 10–40)
AST SERPL-CCNC: 26 U/L (ref 10–40)
AST SERPL-CCNC: 27 U/L (ref 10–40)
AST SERPL-CCNC: 40 U/L (ref 10–40)
BACTERIA BLD CULT: NORMAL
BARBITURATES UR QL SCN>200 NG/ML: NORMAL
BARBITURATES UR QL SCN>200 NG/ML: NORMAL
BASOPHILS # BLD AUTO: 0 K/UL (ref 0–0.2)
BASOPHILS # BLD AUTO: 0.01 K/UL (ref 0–0.2)
BASOPHILS # BLD AUTO: 0.02 K/UL (ref 0–0.2)
BASOPHILS NFR BLD: 0 % (ref 0–1.9)
BASOPHILS NFR BLD: 0.1 % (ref 0–1.9)
BASOPHILS NFR BLD: 0.1 % (ref 0–1.9)
BASOPHILS NFR BLD: 0.2 % (ref 0–1.9)
BASOPHILS NFR BLD: 0.2 % (ref 0–1.9)
BASOPHILS NFR BLD: 0.5 % (ref 0–1.9)
BENZODIAZ UR QL SCN>200 NG/ML: NEGATIVE
BENZODIAZ UR QL SCN>200 NG/ML: NEGATIVE
BILIRUB SERPL-MCNC: 0.1 MG/DL (ref 0.1–1)
BILIRUB SERPL-MCNC: 0.3 MG/DL (ref 0.1–1)
BILIRUB SERPL-MCNC: 0.3 MG/DL (ref 0.1–1)
BILIRUB SERPL-MCNC: 0.4 MG/DL (ref 0.1–1)
BILIRUB UR QL STRIP: NEGATIVE
BNP SERPL-MCNC: 198 PG/ML (ref 0–99)
BNP SERPL-MCNC: 249 PG/ML (ref 0–99)
BNP SERPL-MCNC: 79 PG/ML (ref 0–99)
BUN SERPL-MCNC: 11 MG/DL (ref 8–23)
BUN SERPL-MCNC: 11 MG/DL (ref 8–23)
BUN SERPL-MCNC: 12 MG/DL (ref 8–23)
BUN SERPL-MCNC: 13 MG/DL (ref 8–23)
BUN SERPL-MCNC: 14 MG/DL (ref 8–23)
BUN SERPL-MCNC: 16 MG/DL (ref 8–23)
BUN SERPL-MCNC: 16 MG/DL (ref 8–23)
BUN SERPL-MCNC: 17 MG/DL (ref 8–23)
BUN SERPL-MCNC: 9 MG/DL (ref 8–23)
BUN SERPL-MCNC: 9 MG/DL (ref 8–23)
BZE UR QL SCN: NEGATIVE
BZE UR QL SCN: NEGATIVE
CALCIUM SERPL-MCNC: 10.6 MG/DL (ref 8.7–10.5)
CALCIUM SERPL-MCNC: 9.1 MG/DL (ref 8.7–10.5)
CALCIUM SERPL-MCNC: 9.1 MG/DL (ref 8.7–10.5)
CALCIUM SERPL-MCNC: 9.3 MG/DL (ref 8.7–10.5)
CALCIUM SERPL-MCNC: 9.3 MG/DL (ref 8.7–10.5)
CALCIUM SERPL-MCNC: 9.5 MG/DL (ref 8.7–10.5)
CALCIUM SERPL-MCNC: 9.5 MG/DL (ref 8.7–10.5)
CALCIUM SERPL-MCNC: 9.7 MG/DL (ref 8.7–10.5)
CALCIUM SERPL-MCNC: 9.7 MG/DL (ref 8.7–10.5)
CALCIUM SERPL-MCNC: 9.9 MG/DL (ref 8.7–10.5)
CANNABINOIDS UR QL SCN: NEGATIVE
CANNABINOIDS UR QL SCN: NEGATIVE
CHLORIDE SERPL-SCNC: 75 MMOL/L (ref 95–110)
CHLORIDE SERPL-SCNC: 76 MMOL/L (ref 95–110)
CHLORIDE SERPL-SCNC: 77 MMOL/L (ref 95–110)
CHLORIDE SERPL-SCNC: 79 MMOL/L (ref 95–110)
CHLORIDE SERPL-SCNC: 83 MMOL/L (ref 95–110)
CHLORIDE SERPL-SCNC: 85 MMOL/L (ref 95–110)
CHLORIDE SERPL-SCNC: 89 MMOL/L (ref 95–110)
CHLORIDE SERPL-SCNC: 90 MMOL/L (ref 95–110)
CHLORIDE SERPL-SCNC: 93 MMOL/L (ref 95–110)
CHLORIDE SERPL-SCNC: 93 MMOL/L (ref 95–110)
CK MB SERPL-MCNC: 3.5 NG/ML (ref 0.1–6.5)
CK MB SERPL-MCNC: 5.1 NG/ML (ref 0.1–6.5)
CK MB SERPL-MCNC: 6.1 NG/ML (ref 0.1–6.5)
CK MB SERPL-RTO: 2.9 % (ref 0–5)
CK MB SERPL-RTO: 4.8 % (ref 0–5)
CK MB SERPL-RTO: 5.1 % (ref 0–5)
CK SERPL-CCNC: 100 U/L (ref 20–180)
CK SERPL-CCNC: 100 U/L (ref 20–180)
CK SERPL-CCNC: 122 U/L (ref 20–180)
CK SERPL-CCNC: 122 U/L (ref 20–180)
CK SERPL-CCNC: 128 U/L (ref 20–180)
CLARITY UR: CLEAR
CO2 SERPL-SCNC: 35 MMOL/L (ref 23–29)
CO2 SERPL-SCNC: 35 MMOL/L (ref 23–29)
CO2 SERPL-SCNC: 36 MMOL/L (ref 23–29)
CO2 SERPL-SCNC: 37 MMOL/L (ref 23–29)
CO2 SERPL-SCNC: 38 MMOL/L (ref 23–29)
CO2 SERPL-SCNC: 40 MMOL/L (ref 23–29)
CO2 SERPL-SCNC: 40 MMOL/L (ref 23–29)
CO2 SERPL-SCNC: 41 MMOL/L (ref 23–29)
CO2 SERPL-SCNC: 42 MMOL/L (ref 23–29)
CO2 SERPL-SCNC: 44 MMOL/L (ref 23–29)
COLOR UR: YELLOW
CREAT SERPL-MCNC: 0.6 MG/DL (ref 0.5–1.4)
CREAT SERPL-MCNC: 0.7 MG/DL (ref 0.5–1.4)
CREAT UR-MCNC: 40.2 MG/DL (ref 15–325)
CREAT UR-MCNC: 48.9 MG/DL (ref 15–325)
D DIMER PPP IA.FEU-MCNC: 0.79 MG/L FEU
D DIMER PPP IA.FEU-MCNC: 1.15 MG/L FEU
DELSYS: ABNORMAL
DIFFERENTIAL METHOD: ABNORMAL
EOSINOPHIL # BLD AUTO: 0 K/UL (ref 0–0.5)
EOSINOPHIL NFR BLD: 0 % (ref 0–8)
EOSINOPHIL NFR BLD: 0.2 % (ref 0–8)
EOSINOPHIL NFR BLD: 0.2 % (ref 0–8)
EOSINOPHIL NFR BLD: 1 % (ref 0–8)
ERYTHROCYTE [DISTWIDTH] IN BLOOD BY AUTOMATED COUNT: 12.7 % (ref 11.5–14.5)
ERYTHROCYTE [DISTWIDTH] IN BLOOD BY AUTOMATED COUNT: 12.8 % (ref 11.5–14.5)
ERYTHROCYTE [DISTWIDTH] IN BLOOD BY AUTOMATED COUNT: 13 % (ref 11.5–14.5)
ERYTHROCYTE [DISTWIDTH] IN BLOOD BY AUTOMATED COUNT: 13.1 % (ref 11.5–14.5)
ERYTHROCYTE [DISTWIDTH] IN BLOOD BY AUTOMATED COUNT: 13.1 % (ref 11.5–14.5)
ERYTHROCYTE [DISTWIDTH] IN BLOOD BY AUTOMATED COUNT: 13.2 % (ref 11.5–14.5)
ERYTHROCYTE [DISTWIDTH] IN BLOOD BY AUTOMATED COUNT: 13.2 % (ref 11.5–14.5)
ERYTHROCYTE [DISTWIDTH] IN BLOOD BY AUTOMATED COUNT: 13.3 % (ref 11.5–14.5)
EST. GFR  (AFRICAN AMERICAN): >60 ML/MIN/1.73 M^2
EST. GFR  (NON AFRICAN AMERICAN): >60 ML/MIN/1.73 M^2
GLUCOSE SERPL-MCNC: 106 MG/DL (ref 70–110)
GLUCOSE SERPL-MCNC: 107 MG/DL (ref 70–110)
GLUCOSE SERPL-MCNC: 123 MG/DL (ref 70–110)
GLUCOSE SERPL-MCNC: 130 MG/DL (ref 70–110)
GLUCOSE SERPL-MCNC: 132 MG/DL (ref 70–110)
GLUCOSE SERPL-MCNC: 137 MG/DL (ref 70–110)
GLUCOSE SERPL-MCNC: 141 MG/DL (ref 70–110)
GLUCOSE SERPL-MCNC: 142 MG/DL (ref 70–110)
GLUCOSE SERPL-MCNC: 151 MG/DL (ref 70–110)
GLUCOSE SERPL-MCNC: 155 MG/DL (ref 70–110)
GLUCOSE UR QL STRIP: NEGATIVE
HCO3 UR-SCNC: 45.8 MMOL/L (ref 22–26)
HCO3 UR-SCNC: 47.4 MMOL/L (ref 22–26)
HCO3 UR-SCNC: 48 MMOL/L (ref 22–26)
HCO3 UR-SCNC: 48.7 MMOL/L (ref 22–26)
HCO3 UR-SCNC: 55.1 MMOL/L (ref 22–26)
HCT VFR BLD AUTO: 35 % (ref 37–48.5)
HCT VFR BLD AUTO: 35 % (ref 37–48.5)
HCT VFR BLD AUTO: 35.2 % (ref 37–48.5)
HCT VFR BLD AUTO: 36.6 % (ref 37–48.5)
HCT VFR BLD AUTO: 37.1 % (ref 37–48.5)
HCT VFR BLD AUTO: 37.3 % (ref 37–48.5)
HCT VFR BLD AUTO: 39.7 % (ref 37–48.5)
HCT VFR BLD AUTO: 40.2 % (ref 37–48.5)
HCT VFR BLD AUTO: 40.3 % (ref 37–48.5)
HCT VFR BLD AUTO: 40.5 % (ref 37–48.5)
HGB BLD-MCNC: 11.2 G/DL (ref 12–16)
HGB BLD-MCNC: 11.2 G/DL (ref 12–16)
HGB BLD-MCNC: 11.5 G/DL (ref 12–16)
HGB BLD-MCNC: 11.7 G/DL (ref 12–16)
HGB BLD-MCNC: 11.9 G/DL (ref 12–16)
HGB BLD-MCNC: 12.2 G/DL (ref 12–16)
HGB BLD-MCNC: 12.2 G/DL (ref 12–16)
HGB BLD-MCNC: 12.3 G/DL (ref 12–16)
HGB BLD-MCNC: 12.4 G/DL (ref 12–16)
HGB BLD-MCNC: 12.4 G/DL (ref 12–16)
HGB UR QL STRIP: NEGATIVE
IMM GRANULOCYTES # BLD AUTO: 0.01 K/UL (ref 0–0.04)
IMM GRANULOCYTES # BLD AUTO: 0.02 K/UL (ref 0–0.04)
IMM GRANULOCYTES # BLD AUTO: 0.02 K/UL (ref 0–0.04)
IMM GRANULOCYTES # BLD AUTO: 0.03 K/UL (ref 0–0.04)
IMM GRANULOCYTES # BLD AUTO: 0.06 K/UL (ref 0–0.04)
IMM GRANULOCYTES # BLD AUTO: 0.07 K/UL (ref 0–0.04)
IMM GRANULOCYTES NFR BLD AUTO: 0.2 % (ref 0–0.5)
IMM GRANULOCYTES NFR BLD AUTO: 0.3 % (ref 0–0.5)
IMM GRANULOCYTES NFR BLD AUTO: 0.4 % (ref 0–0.5)
IMM GRANULOCYTES NFR BLD AUTO: 0.4 % (ref 0–0.5)
IMM GRANULOCYTES NFR BLD AUTO: 0.5 % (ref 0–0.5)
IMM GRANULOCYTES NFR BLD AUTO: 0.5 % (ref 0–0.5)
INFLUENZA A, MOLECULAR: NEGATIVE
INFLUENZA B, MOLECULAR: NEGATIVE
INR PPP: 0.9 (ref 0.8–1.2)
INR PPP: 1 (ref 0.8–1.2)
KETONES UR QL STRIP: NEGATIVE
LACTATE SERPL-SCNC: 0.6 MMOL/L (ref 0.5–2.2)
LEUKOCYTE ESTERASE UR QL STRIP: NEGATIVE
LYMPHOCYTES # BLD AUTO: 0.5 K/UL (ref 1–4.8)
LYMPHOCYTES # BLD AUTO: 0.7 K/UL (ref 1–4.8)
LYMPHOCYTES # BLD AUTO: 0.8 K/UL (ref 1–4.8)
LYMPHOCYTES # BLD AUTO: 0.8 K/UL (ref 1–4.8)
LYMPHOCYTES # BLD AUTO: 0.9 K/UL (ref 1–4.8)
LYMPHOCYTES # BLD AUTO: 0.9 K/UL (ref 1–4.8)
LYMPHOCYTES # BLD AUTO: 1.2 K/UL (ref 1–4.8)
LYMPHOCYTES NFR BLD: 10.1 % (ref 18–48)
LYMPHOCYTES NFR BLD: 10.6 % (ref 18–48)
LYMPHOCYTES NFR BLD: 12.4 % (ref 18–48)
LYMPHOCYTES NFR BLD: 13.2 % (ref 18–48)
LYMPHOCYTES NFR BLD: 15.3 % (ref 18–48)
LYMPHOCYTES NFR BLD: 19.1 % (ref 18–48)
LYMPHOCYTES NFR BLD: 21.3 % (ref 18–48)
LYMPHOCYTES NFR BLD: 3.4 % (ref 18–48)
LYMPHOCYTES NFR BLD: 30.4 % (ref 18–48)
LYMPHOCYTES NFR BLD: 4 % (ref 18–48)
MAGNESIUM SERPL-MCNC: 2 MG/DL (ref 1.6–2.6)
MCH RBC QN AUTO: 27.8 PG (ref 27–31)
MCH RBC QN AUTO: 27.8 PG (ref 27–31)
MCH RBC QN AUTO: 28 PG (ref 27–31)
MCH RBC QN AUTO: 28.1 PG (ref 27–31)
MCH RBC QN AUTO: 28.2 PG (ref 27–31)
MCH RBC QN AUTO: 28.6 PG (ref 27–31)
MCH RBC QN AUTO: 28.8 PG (ref 27–31)
MCH RBC QN AUTO: 28.9 PG (ref 27–31)
MCH RBC QN AUTO: 29 PG (ref 27–31)
MCH RBC QN AUTO: 29.4 PG (ref 27–31)
MCHC RBC AUTO-ENTMCNC: 30.2 G/DL (ref 32–36)
MCHC RBC AUTO-ENTMCNC: 30.3 G/DL (ref 32–36)
MCHC RBC AUTO-ENTMCNC: 30.6 G/DL (ref 32–36)
MCHC RBC AUTO-ENTMCNC: 30.7 G/DL (ref 32–36)
MCHC RBC AUTO-ENTMCNC: 30.8 G/DL (ref 32–36)
MCHC RBC AUTO-ENTMCNC: 31.8 G/DL (ref 32–36)
MCHC RBC AUTO-ENTMCNC: 32.5 G/DL (ref 32–36)
MCHC RBC AUTO-ENTMCNC: 32.9 G/DL (ref 32–36)
MCHC RBC AUTO-ENTMCNC: 33 G/DL (ref 32–36)
MCHC RBC AUTO-ENTMCNC: 33.4 G/DL (ref 32–36)
MCV RBC AUTO: 84 FL (ref 82–98)
MCV RBC AUTO: 85 FL (ref 82–98)
MCV RBC AUTO: 87 FL (ref 82–98)
MCV RBC AUTO: 87 FL (ref 82–98)
MCV RBC AUTO: 88 FL (ref 82–98)
MCV RBC AUTO: 91 FL (ref 82–98)
MCV RBC AUTO: 94 FL (ref 82–98)
MCV RBC AUTO: 95 FL (ref 82–98)
MCV RBC AUTO: 95 FL (ref 82–98)
MCV RBC AUTO: 96 FL (ref 82–98)
METHADONE UR QL SCN>300 NG/ML: NEGATIVE
METHADONE UR QL SCN>300 NG/ML: NEGATIVE
MONOCYTES # BLD AUTO: 0.1 K/UL (ref 0.3–1)
MONOCYTES # BLD AUTO: 0.2 K/UL (ref 0.3–1)
MONOCYTES # BLD AUTO: 0.3 K/UL (ref 0.3–1)
MONOCYTES # BLD AUTO: 0.3 K/UL (ref 0.3–1)
MONOCYTES # BLD AUTO: 0.5 K/UL (ref 0.3–1)
MONOCYTES # BLD AUTO: 0.6 K/UL (ref 0.3–1)
MONOCYTES # BLD AUTO: 0.6 K/UL (ref 0.3–1)
MONOCYTES # BLD AUTO: 0.7 K/UL (ref 0.3–1)
MONOCYTES NFR BLD: 11.5 % (ref 4–15)
MONOCYTES NFR BLD: 4 % (ref 4–15)
MONOCYTES NFR BLD: 4.1 % (ref 4–15)
MONOCYTES NFR BLD: 4.2 % (ref 4–15)
MONOCYTES NFR BLD: 4.5 % (ref 4–15)
MONOCYTES NFR BLD: 4.9 % (ref 4–15)
MONOCYTES NFR BLD: 7 % (ref 4–15)
MONOCYTES NFR BLD: 8.8 % (ref 4–15)
MONOCYTES NFR BLD: 8.9 % (ref 4–15)
MONOCYTES NFR BLD: 9.8 % (ref 4–15)
NEUTROPHILS # BLD AUTO: 1.6 K/UL (ref 1.8–7.7)
NEUTROPHILS # BLD AUTO: 10.3 K/UL (ref 1.8–7.7)
NEUTROPHILS # BLD AUTO: 13.6 K/UL (ref 1.8–7.7)
NEUTROPHILS # BLD AUTO: 2.3 K/UL (ref 1.8–7.7)
NEUTROPHILS # BLD AUTO: 3.5 K/UL (ref 1.8–7.7)
NEUTROPHILS # BLD AUTO: 4.1 K/UL (ref 1.8–7.7)
NEUTROPHILS # BLD AUTO: 4.3 K/UL (ref 1.8–7.7)
NEUTROPHILS # BLD AUTO: 4.3 K/UL (ref 1.8–7.7)
NEUTROPHILS # BLD AUTO: 4.8 K/UL (ref 1.8–7.7)
NEUTROPHILS # BLD AUTO: 6.6 K/UL (ref 1.8–7.7)
NEUTROPHILS NFR BLD: 56.6 % (ref 38–73)
NEUTROPHILS NFR BLD: 73.9 % (ref 38–73)
NEUTROPHILS NFR BLD: 74.2 % (ref 38–73)
NEUTROPHILS NFR BLD: 76.4 % (ref 38–73)
NEUTROPHILS NFR BLD: 78.1 % (ref 38–73)
NEUTROPHILS NFR BLD: 79.8 % (ref 38–73)
NEUTROPHILS NFR BLD: 80.1 % (ref 38–73)
NEUTROPHILS NFR BLD: 85.4 % (ref 38–73)
NEUTROPHILS NFR BLD: 91.2 % (ref 38–73)
NEUTROPHILS NFR BLD: 92 % (ref 38–73)
NITRITE UR QL STRIP: NEGATIVE
NRBC BLD-RTO: 0 /100 WBC
OPIATES UR QL SCN: NEGATIVE
OPIATES UR QL SCN: NORMAL
PCO2 BLDA: 61 MMHG (ref 35–45)
PCO2 BLDA: 69 MMHG (ref 35–45)
PCO2 BLDA: 83 MMHG (ref 35–45)
PCO2 BLDA: 83 MMHG (ref 35–45)
PCO2 BLDA: 92 MMHG (ref 35–45)
PCP UR QL SCN>25 NG/ML: NEGATIVE
PCP UR QL SCN>25 NG/ML: NEGATIVE
PH SMN: 7.32 [PH] (ref 7.35–7.45)
PH SMN: 7.37 [PH] (ref 7.35–7.45)
PH SMN: 7.43 [PH] (ref 7.35–7.45)
PH SMN: 7.43 [PH] (ref 7.35–7.45)
PH SMN: 7.51 [PH] (ref 7.35–7.45)
PH UR STRIP: 6 [PH] (ref 5–8)
PHOSPHATE SERPL-MCNC: 3.7 MG/DL (ref 2.7–4.5)
PLATELET # BLD AUTO: 209 K/UL (ref 150–350)
PLATELET # BLD AUTO: 217 K/UL (ref 150–350)
PLATELET # BLD AUTO: 219 K/UL (ref 150–350)
PLATELET # BLD AUTO: 221 K/UL (ref 150–350)
PLATELET # BLD AUTO: 225 K/UL (ref 150–350)
PLATELET # BLD AUTO: 230 K/UL (ref 150–350)
PLATELET # BLD AUTO: 248 K/UL (ref 150–350)
PLATELET # BLD AUTO: 254 K/UL (ref 150–350)
PLATELET # BLD AUTO: 255 K/UL (ref 150–350)
PLATELET # BLD AUTO: 259 K/UL (ref 150–350)
PMV BLD AUTO: 8.6 FL (ref 9.2–12.9)
PMV BLD AUTO: 8.8 FL (ref 9.2–12.9)
PMV BLD AUTO: 9.1 FL (ref 9.2–12.9)
PMV BLD AUTO: 9.1 FL (ref 9.2–12.9)
PMV BLD AUTO: 9.2 FL (ref 9.2–12.9)
PMV BLD AUTO: 9.3 FL (ref 9.2–12.9)
PMV BLD AUTO: 9.4 FL (ref 9.2–12.9)
PMV BLD AUTO: 9.4 FL (ref 9.2–12.9)
PMV BLD AUTO: 9.5 FL (ref 9.2–12.9)
PMV BLD AUTO: 9.6 FL (ref 9.2–12.9)
PO2 BLDA: 114 MMHG (ref 75–100)
PO2 BLDA: 64 MMHG (ref 75–100)
PO2 BLDA: 66 MMHG (ref 75–100)
PO2 BLDA: 71 MMHG (ref 75–100)
PO2 BLDA: 80 MMHG (ref 75–100)
POC BE: 17 MMOL/L (ref -2–2)
POC BE: 18 MMOL/L (ref -2–2)
POC BE: 18.3 MMOL/L (ref -2–2)
POC BE: 22 MMOL/L (ref -2–2)
POC BE: 25.6 MMOL/L (ref -2–2)
POC COHB: 1.8 % (ref 0–3)
POC COHB: 1.9 % (ref 0–3)
POC COHB: 2.1 % (ref 0–3)
POC COHB: 2.6 % (ref 0–3)
POC COHB: 3.7 % (ref 0–3)
POC METHB: 1 % (ref 0–1.5)
POC METHB: 1.1 % (ref 0–1.5)
POC METHB: 1.2 % (ref 0–1.5)
POC METHB: 1.5 % (ref 0–1.5)
POC METHB: 1.5 % (ref 0–1.5)
POC O2HB ARTERIAL: 92.3 % (ref 94–100)
POC O2HB ARTERIAL: 93.8 % (ref 94–100)
POC O2HB ARTERIAL: 94 % (ref 94–100)
POC O2HB ARTERIAL: 94.4 % (ref 94–100)
POC O2HB ARTERIAL: 94.6 % (ref 94–100)
POC SATURATED O2: 95.8 % (ref 90–100)
POC SATURATED O2: 97 % (ref 90–100)
POC SATURATED O2: 97.4 % (ref 90–100)
POC SATURATED O2: 97.8 % (ref 90–100)
POC SATURATED O2: 98.9 % (ref 90–100)
POC TCO2: 47.9 MMOL/L
POC TCO2: 50.2 MMOL/L
POC TCO2: 50.5 MMOL/L
POC TCO2: 50.6 MMOL/L
POC TCO2: 57.6 MMOL/L
POC THB: 12.1 G/DL (ref 12–18)
POC THB: 12.4 G/DL (ref 12–18)
POC THB: 12.5 G/DL (ref 12–18)
POC THB: 12.6 G/DL (ref 12–18)
POC THB: 13.1 G/DL (ref 12–18)
POCT GLUCOSE: 102 MG/DL (ref 70–110)
POCT GLUCOSE: 104 MG/DL (ref 70–110)
POCT GLUCOSE: 105 MG/DL (ref 70–110)
POCT GLUCOSE: 107 MG/DL (ref 70–110)
POCT GLUCOSE: 107 MG/DL (ref 70–110)
POCT GLUCOSE: 110 MG/DL (ref 70–110)
POCT GLUCOSE: 110 MG/DL (ref 70–110)
POCT GLUCOSE: 112 MG/DL (ref 70–110)
POCT GLUCOSE: 117 MG/DL (ref 70–110)
POCT GLUCOSE: 120 MG/DL (ref 70–110)
POCT GLUCOSE: 122 MG/DL (ref 70–110)
POCT GLUCOSE: 123 MG/DL (ref 70–110)
POCT GLUCOSE: 125 MG/DL (ref 70–110)
POCT GLUCOSE: 128 MG/DL (ref 70–110)
POCT GLUCOSE: 128 MG/DL (ref 70–110)
POCT GLUCOSE: 130 MG/DL (ref 70–110)
POCT GLUCOSE: 134 MG/DL (ref 70–110)
POCT GLUCOSE: 142 MG/DL (ref 70–110)
POCT GLUCOSE: 150 MG/DL (ref 70–110)
POCT GLUCOSE: 151 MG/DL (ref 70–110)
POCT GLUCOSE: 151 MG/DL (ref 70–110)
POCT GLUCOSE: 156 MG/DL (ref 70–110)
POCT GLUCOSE: 167 MG/DL (ref 70–110)
POCT GLUCOSE: 175 MG/DL (ref 70–110)
POCT GLUCOSE: 188 MG/DL (ref 70–110)
POCT GLUCOSE: 195 MG/DL (ref 70–110)
POCT GLUCOSE: 197 MG/DL (ref 70–110)
POCT GLUCOSE: 221 MG/DL (ref 70–110)
POCT GLUCOSE: 240 MG/DL (ref 70–110)
POCT GLUCOSE: 250 MG/DL (ref 70–110)
POCT GLUCOSE: 251 MG/DL (ref 70–110)
POCT GLUCOSE: 252 MG/DL (ref 70–110)
POCT GLUCOSE: 76 MG/DL (ref 70–110)
POCT GLUCOSE: 79 MG/DL (ref 70–110)
POCT GLUCOSE: 83 MG/DL (ref 70–110)
POCT GLUCOSE: 85 MG/DL (ref 70–110)
POCT GLUCOSE: 86 MG/DL (ref 70–110)
POCT GLUCOSE: 86 MG/DL (ref 70–110)
POCT GLUCOSE: 91 MG/DL (ref 70–110)
POCT GLUCOSE: 94 MG/DL (ref 70–110)
POCT GLUCOSE: 95 MG/DL (ref 70–110)
POCT GLUCOSE: 97 MG/DL (ref 70–110)
POTASSIUM SERPL-SCNC: 3.6 MMOL/L (ref 3.5–5.1)
POTASSIUM SERPL-SCNC: 3.8 MMOL/L (ref 3.5–5.1)
POTASSIUM SERPL-SCNC: 4.2 MMOL/L (ref 3.5–5.1)
POTASSIUM SERPL-SCNC: 4.4 MMOL/L (ref 3.5–5.1)
POTASSIUM SERPL-SCNC: 4.7 MMOL/L (ref 3.5–5.1)
POTASSIUM SERPL-SCNC: 4.7 MMOL/L (ref 3.5–5.1)
POTASSIUM SERPL-SCNC: 4.8 MMOL/L (ref 3.5–5.1)
POTASSIUM SERPL-SCNC: 4.8 MMOL/L (ref 3.5–5.1)
POTASSIUM SERPL-SCNC: 4.9 MMOL/L (ref 3.5–5.1)
POTASSIUM SERPL-SCNC: 5 MMOL/L (ref 3.5–5.1)
PROT SERPL-MCNC: 6.1 G/DL (ref 6–8.4)
PROT SERPL-MCNC: 6.1 G/DL (ref 6–8.4)
PROT SERPL-MCNC: 6.5 G/DL (ref 6–8.4)
PROT SERPL-MCNC: 6.6 G/DL (ref 6–8.4)
PROT SERPL-MCNC: 6.7 G/DL (ref 6–8.4)
PROT SERPL-MCNC: 6.9 G/DL (ref 6–8.4)
PROT SERPL-MCNC: 7.5 G/DL (ref 6–8.4)
PROT UR QL STRIP: NEGATIVE
PROTHROMBIN TIME: 10.1 SEC (ref 9–12.5)
PROTHROMBIN TIME: 9.9 SEC (ref 9–12.5)
RBC # BLD AUTO: 3.92 M/UL (ref 4–5.4)
RBC # BLD AUTO: 4.03 M/UL (ref 4–5.4)
RBC # BLD AUTO: 4.11 M/UL (ref 4–5.4)
RBC # BLD AUTO: 4.16 M/UL (ref 4–5.4)
RBC # BLD AUTO: 4.22 M/UL (ref 4–5.4)
RBC # BLD AUTO: 4.23 M/UL (ref 4–5.4)
RBC # BLD AUTO: 4.24 M/UL (ref 4–5.4)
RBC # BLD AUTO: 4.46 M/UL (ref 4–5.4)
SITE: ABNORMAL
SODIUM SERPL-SCNC: 124 MMOL/L (ref 136–145)
SODIUM SERPL-SCNC: 127 MMOL/L (ref 136–145)
SODIUM SERPL-SCNC: 128 MMOL/L (ref 136–145)
SODIUM SERPL-SCNC: 128 MMOL/L (ref 136–145)
SODIUM SERPL-SCNC: 129 MMOL/L (ref 136–145)
SODIUM SERPL-SCNC: 131 MMOL/L (ref 136–145)
SODIUM SERPL-SCNC: 135 MMOL/L (ref 136–145)
SODIUM SERPL-SCNC: 137 MMOL/L (ref 136–145)
SODIUM SERPL-SCNC: 137 MMOL/L (ref 136–145)
SODIUM SERPL-SCNC: 138 MMOL/L (ref 136–145)
SP GR UR STRIP: 1.01 (ref 1–1.03)
SPECIMEN SOURCE: NORMAL
T4 FREE SERPL-MCNC: 0.78 NG/DL (ref 0.71–1.51)
TB INDURATION 48 - 72 HR READ: 0 MM
TOXICOLOGY INFORMATION: NORMAL
TOXICOLOGY INFORMATION: NORMAL
TROPONIN I SERPL DL<=0.01 NG/ML-MCNC: 0.01 NG/ML (ref 0–0.03)
TROPONIN I SERPL DL<=0.01 NG/ML-MCNC: 0.04 NG/ML (ref 0–0.03)
TROPONIN I SERPL DL<=0.01 NG/ML-MCNC: 0.04 NG/ML (ref 0–0.03)
TROPONIN I SERPL DL<=0.01 NG/ML-MCNC: <0.006 NG/ML (ref 0–0.03)
TSH SERPL DL<=0.005 MIU/L-ACNC: 8.56 UIU/ML (ref 0.4–4)
URN SPEC COLLECT METH UR: NORMAL
UROBILINOGEN UR STRIP-ACNC: NEGATIVE EU/DL
WBC # BLD AUTO: 11.31 K/UL (ref 3.9–12.7)
WBC # BLD AUTO: 14.76 K/UL (ref 3.9–12.7)
WBC # BLD AUTO: 2.21 K/UL (ref 3.9–12.7)
WBC # BLD AUTO: 4.08 K/UL (ref 3.9–12.7)
WBC # BLD AUTO: 4.7 K/UL (ref 3.9–12.7)
WBC # BLD AUTO: 5.07 K/UL (ref 3.9–12.7)
WBC # BLD AUTO: 5.11 K/UL (ref 3.9–12.7)
WBC # BLD AUTO: 5.6 K/UL (ref 3.9–12.7)
WBC # BLD AUTO: 6.14 K/UL (ref 3.9–12.7)
WBC # BLD AUTO: 8.18 K/UL (ref 3.9–12.7)

## 2019-01-01 PROCEDURE — 94660 CPAP INITIATION&MGMT: CPT

## 2019-01-01 PROCEDURE — 63600175 PHARM REV CODE 636 W HCPCS: Performed by: INTERNAL MEDICINE

## 2019-01-01 PROCEDURE — 85025 COMPLETE CBC W/AUTO DIFF WBC: CPT

## 2019-01-01 PROCEDURE — 25000003 PHARM REV CODE 250: Performed by: INTERNAL MEDICINE

## 2019-01-01 PROCEDURE — 99231 PR SUBSEQUENT HOSPITAL CARE,LEVL I: ICD-10-PCS | Mod: ,,, | Performed by: FAMILY MEDICINE

## 2019-01-01 PROCEDURE — 25000003 PHARM REV CODE 250: Performed by: NURSE PRACTITIONER

## 2019-01-01 PROCEDURE — 25000242 PHARM REV CODE 250 ALT 637 W/ HCPCS: Performed by: INTERNAL MEDICINE

## 2019-01-01 PROCEDURE — 85610 PROTHROMBIN TIME: CPT

## 2019-01-01 PROCEDURE — 36415 COLL VENOUS BLD VENIPUNCTURE: CPT

## 2019-01-01 PROCEDURE — 25000242 PHARM REV CODE 250 ALT 637 W/ HCPCS: Performed by: SURGERY

## 2019-01-01 PROCEDURE — 94640 AIRWAY INHALATION TREATMENT: CPT

## 2019-01-01 PROCEDURE — 99238 PR HOSPITAL DISCHARGE DAY,<30 MIN: ICD-10-PCS | Mod: ,,, | Performed by: FAMILY MEDICINE

## 2019-01-01 PROCEDURE — 80053 COMPREHEN METABOLIC PANEL: CPT

## 2019-01-01 PROCEDURE — 80048 BASIC METABOLIC PNL TOTAL CA: CPT

## 2019-01-01 PROCEDURE — 36600 WITHDRAWAL OF ARTERIAL BLOOD: CPT

## 2019-01-01 PROCEDURE — 27000221 HC OXYGEN, UP TO 24 HOURS

## 2019-01-01 PROCEDURE — 99232 PR SUBSEQUENT HOSPITAL CARE,LEVL II: ICD-10-PCS | Mod: ,,, | Performed by: FAMILY MEDICINE

## 2019-01-01 PROCEDURE — 11000001 HC ACUTE MED/SURG PRIVATE ROOM

## 2019-01-01 PROCEDURE — 84443 ASSAY THYROID STIM HORMONE: CPT

## 2019-01-01 PROCEDURE — 63600175 PHARM REV CODE 636 W HCPCS: Performed by: NURSE PRACTITIONER

## 2019-01-01 PROCEDURE — 83880 ASSAY OF NATRIURETIC PEPTIDE: CPT

## 2019-01-01 PROCEDURE — 99284 EMERGENCY DEPT VISIT MOD MDM: CPT | Mod: 25

## 2019-01-01 PROCEDURE — 85730 THROMBOPLASTIN TIME PARTIAL: CPT

## 2019-01-01 PROCEDURE — 93010 EKG 12-LEAD: ICD-10-PCS | Mod: ,,, | Performed by: INTERNAL MEDICINE

## 2019-01-01 PROCEDURE — 63600175 PHARM REV CODE 636 W HCPCS: Performed by: SURGERY

## 2019-01-01 PROCEDURE — 94761 N-INVAS EAR/PLS OXIMETRY MLT: CPT

## 2019-01-01 PROCEDURE — 99232 SBSQ HOSP IP/OBS MODERATE 35: CPT | Mod: ,,, | Performed by: INTERNAL MEDICINE

## 2019-01-01 PROCEDURE — 97116 GAIT TRAINING THERAPY: CPT

## 2019-01-01 PROCEDURE — 25500020 PHARM REV CODE 255: Performed by: INTERNAL MEDICINE

## 2019-01-01 PROCEDURE — 25000003 PHARM REV CODE 250: Performed by: SURGERY

## 2019-01-01 PROCEDURE — 20000000 HC ICU ROOM

## 2019-01-01 PROCEDURE — 99222 1ST HOSP IP/OBS MODERATE 55: CPT | Mod: ,,, | Performed by: PSYCHIATRY & NEUROLOGY

## 2019-01-01 PROCEDURE — 99232 PR SUBSEQUENT HOSPITAL CARE,LEVL II: ICD-10-PCS | Mod: ,,, | Performed by: INTERNAL MEDICINE

## 2019-01-01 PROCEDURE — 99231 SBSQ HOSP IP/OBS SF/LOW 25: CPT | Mod: ,,, | Performed by: FAMILY MEDICINE

## 2019-01-01 PROCEDURE — 25000003 PHARM REV CODE 250: Performed by: FAMILY MEDICINE

## 2019-01-01 PROCEDURE — 99900031 HC PATIENT EDUCATION (STAT)

## 2019-01-01 PROCEDURE — 99220 PR INITIAL OBSERVATION CARE,LEVL III: CPT | Mod: ,,, | Performed by: INTERNAL MEDICINE

## 2019-01-01 PROCEDURE — 93005 ELECTROCARDIOGRAM TRACING: CPT

## 2019-01-01 PROCEDURE — 93010 ELECTROCARDIOGRAM REPORT: CPT | Mod: ,,, | Performed by: INTERNAL MEDICINE

## 2019-01-01 PROCEDURE — 90833 PR PSYCHOTHERAPY W/PATIENT W/E&M, 30 MIN (ADD ON): ICD-10-PCS | Mod: ,,, | Performed by: PSYCHIATRY & NEUROLOGY

## 2019-01-01 PROCEDURE — 90833 PSYTX W PT W E/M 30 MIN: CPT | Mod: ,,, | Performed by: PSYCHIATRY & NEUROLOGY

## 2019-01-01 PROCEDURE — 99233 PR SUBSEQUENT HOSPITAL CARE,LEVL III: ICD-10-PCS | Mod: ,,, | Performed by: INTERNAL MEDICINE

## 2019-01-01 PROCEDURE — 97530 THERAPEUTIC ACTIVITIES: CPT

## 2019-01-01 PROCEDURE — 99232 SBSQ HOSP IP/OBS MODERATE 35: CPT | Mod: ,,, | Performed by: FAMILY MEDICINE

## 2019-01-01 PROCEDURE — 99900035 HC TECH TIME PER 15 MIN (STAT)

## 2019-01-01 PROCEDURE — 84100 ASSAY OF PHOSPHORUS: CPT

## 2019-01-01 PROCEDURE — 83735 ASSAY OF MAGNESIUM: CPT

## 2019-01-01 PROCEDURE — 84439 ASSAY OF FREE THYROXINE: CPT

## 2019-01-01 PROCEDURE — 99239 PR HOSPITAL DISCHARGE DAY,>30 MIN: ICD-10-PCS | Mod: ,,, | Performed by: FAMILY MEDICINE

## 2019-01-01 PROCEDURE — 82803 BLOOD GASES ANY COMBINATION: CPT | Performed by: INTERNAL MEDICINE

## 2019-01-01 PROCEDURE — 99285 EMERGENCY DEPT VISIT HI MDM: CPT | Mod: 25

## 2019-01-01 PROCEDURE — 99238 HOSP IP/OBS DSCHRG MGMT 30/<: CPT | Mod: ,,, | Performed by: FAMILY MEDICINE

## 2019-01-01 PROCEDURE — 82550 ASSAY OF CK (CPK): CPT

## 2019-01-01 PROCEDURE — 97535 SELF CARE MNGMENT TRAINING: CPT

## 2019-01-01 PROCEDURE — 90471 IMMUNIZATION ADMIN: CPT | Performed by: INTERNAL MEDICINE

## 2019-01-01 PROCEDURE — 99283 EMERGENCY DEPT VISIT LOW MDM: CPT

## 2019-01-01 PROCEDURE — 80307 DRUG TEST PRSMV CHEM ANLYZR: CPT

## 2019-01-01 PROCEDURE — 96374 THER/PROPH/DIAG INJ IV PUSH: CPT

## 2019-01-01 PROCEDURE — 97110 THERAPEUTIC EXERCISES: CPT

## 2019-01-01 PROCEDURE — 87040 BLOOD CULTURE FOR BACTERIA: CPT

## 2019-01-01 PROCEDURE — 84484 ASSAY OF TROPONIN QUANT: CPT | Mod: 91

## 2019-01-01 PROCEDURE — 82553 CREATINE MB FRACTION: CPT

## 2019-01-01 PROCEDURE — 97162 PT EVAL MOD COMPLEX 30 MIN: CPT

## 2019-01-01 PROCEDURE — 30200315 PPD INTRADERMAL TEST REV CODE 302: Performed by: INTERNAL MEDICINE

## 2019-01-01 PROCEDURE — 99239 HOSP IP/OBS DSCHRG MGMT >30: CPT | Mod: ,,, | Performed by: FAMILY MEDICINE

## 2019-01-01 PROCEDURE — 99233 SBSQ HOSP IP/OBS HIGH 50: CPT | Mod: ,,, | Performed by: INTERNAL MEDICINE

## 2019-01-01 PROCEDURE — 99223 1ST HOSP IP/OBS HIGH 75: CPT | Mod: ,,, | Performed by: INTERNAL MEDICINE

## 2019-01-01 PROCEDURE — 84484 ASSAY OF TROPONIN QUANT: CPT

## 2019-01-01 PROCEDURE — 96372 THER/PROPH/DIAG INJ SC/IM: CPT

## 2019-01-01 PROCEDURE — 82803 BLOOD GASES ANY COMBINATION: CPT | Performed by: SURGERY

## 2019-01-01 PROCEDURE — 97802 MEDICAL NUTRITION INDIV IN: CPT

## 2019-01-01 PROCEDURE — 86580 TB INTRADERMAL TEST: CPT | Performed by: INTERNAL MEDICINE

## 2019-01-01 PROCEDURE — 90732 PPSV23 VACC 2 YRS+ SUBQ/IM: CPT | Performed by: INTERNAL MEDICINE

## 2019-01-01 PROCEDURE — 99220 PR INITIAL OBSERVATION CARE,LEVL III: ICD-10-PCS | Mod: ,,, | Performed by: INTERNAL MEDICINE

## 2019-01-01 PROCEDURE — 83605 ASSAY OF LACTIC ACID: CPT

## 2019-01-01 PROCEDURE — 85379 FIBRIN DEGRADATION QUANT: CPT

## 2019-01-01 PROCEDURE — 87502 INFLUENZA DNA AMP PROBE: CPT

## 2019-01-01 PROCEDURE — 29125 APPL SHORT ARM SPLINT STATIC: CPT | Mod: LT

## 2019-01-01 PROCEDURE — 97116 GAIT TRAINING THERAPY: CPT | Performed by: PHYSICAL THERAPIST

## 2019-01-01 PROCEDURE — 27000190 HC CPAP FULL FACE MASK W/VALVE

## 2019-01-01 PROCEDURE — 99223 PR INITIAL HOSPITAL CARE,LEVL III: ICD-10-PCS | Mod: ,,, | Performed by: INTERNAL MEDICINE

## 2019-01-01 PROCEDURE — 81003 URINALYSIS AUTO W/O SCOPE: CPT | Mod: 59

## 2019-01-01 PROCEDURE — 99222 PR INITIAL HOSPITAL CARE,LEVL II: ICD-10-PCS | Mod: ,,, | Performed by: PSYCHIATRY & NEUROLOGY

## 2019-01-01 RX ORDER — IBUPROFEN 200 MG
1 TABLET ORAL DAILY
Status: DISCONTINUED | OUTPATIENT
Start: 2019-01-01 | End: 2019-01-01 | Stop reason: HOSPADM

## 2019-01-01 RX ORDER — PREDNISONE 20 MG/1
40 TABLET ORAL DAILY
Qty: 12 TABLET | Refills: 0 | Status: ON HOLD | OUTPATIENT
Start: 2019-01-01 | End: 2019-01-01 | Stop reason: HOSPADM

## 2019-01-01 RX ORDER — ACETAMINOPHEN 325 MG/1
650 TABLET ORAL EVERY 8 HOURS PRN
Status: DISCONTINUED | OUTPATIENT
Start: 2019-01-01 | End: 2019-01-01 | Stop reason: HOSPADM

## 2019-01-01 RX ORDER — LEVALBUTEROL 1.25 MG/.5ML
1.25 SOLUTION, CONCENTRATE RESPIRATORY (INHALATION) EVERY 6 HOURS PRN
Status: DISCONTINUED | OUTPATIENT
Start: 2019-01-01 | End: 2019-01-01 | Stop reason: HOSPADM

## 2019-01-01 RX ORDER — PERMETHRIN 50 MG/G
CREAM TOPICAL ONCE
Qty: 60 G | Refills: 1 | Status: SHIPPED | OUTPATIENT
Start: 2019-01-01 | End: 2019-01-01

## 2019-01-01 RX ORDER — TIZANIDINE 2 MG/1
2 TABLET ORAL EVERY 8 HOURS PRN
Status: DISCONTINUED | OUTPATIENT
Start: 2019-01-01 | End: 2019-01-01 | Stop reason: HOSPADM

## 2019-01-01 RX ORDER — HYDROXYZINE PAMOATE 25 MG/1
25 CAPSULE ORAL EVERY 8 HOURS PRN
Status: DISCONTINUED | OUTPATIENT
Start: 2019-01-01 | End: 2019-01-01 | Stop reason: HOSPADM

## 2019-01-01 RX ORDER — HYDROCODONE BITARTRATE AND ACETAMINOPHEN 10; 325 MG/1; MG/1
1 TABLET ORAL EVERY 6 HOURS PRN
Status: DISCONTINUED | OUTPATIENT
Start: 2019-01-01 | End: 2019-01-01

## 2019-01-01 RX ORDER — SODIUM CHLORIDE 0.9 % (FLUSH) 0.9 %
10 SYRINGE (ML) INJECTION
Status: DISCONTINUED | OUTPATIENT
Start: 2019-01-01 | End: 2019-01-01 | Stop reason: HOSPADM

## 2019-01-01 RX ORDER — IPRATROPIUM BROMIDE AND ALBUTEROL SULFATE 2.5; .5 MG/3ML; MG/3ML
3 SOLUTION RESPIRATORY (INHALATION)
Status: COMPLETED | OUTPATIENT
Start: 2019-01-01 | End: 2019-01-01

## 2019-01-01 RX ORDER — LEVOTHYROXINE SODIUM 125 UG/1
125 TABLET ORAL DAILY
Status: DISCONTINUED | OUTPATIENT
Start: 2019-01-01 | End: 2019-01-01 | Stop reason: HOSPADM

## 2019-01-01 RX ORDER — AMLODIPINE BESYLATE 10 MG/1
10 TABLET ORAL DAILY
Status: DISCONTINUED | OUTPATIENT
Start: 2019-01-01 | End: 2019-01-01 | Stop reason: HOSPADM

## 2019-01-01 RX ORDER — LEVOTHYROXINE SODIUM 125 UG/1
125 TABLET ORAL
Status: DISCONTINUED | OUTPATIENT
Start: 2019-01-01 | End: 2019-01-01 | Stop reason: HOSPADM

## 2019-01-01 RX ORDER — IPRATROPIUM BROMIDE AND ALBUTEROL SULFATE 2.5; .5 MG/3ML; MG/3ML
3 SOLUTION RESPIRATORY (INHALATION) EVERY 6 HOURS
Status: DISCONTINUED | OUTPATIENT
Start: 2019-01-01 | End: 2019-01-01 | Stop reason: HOSPADM

## 2019-01-01 RX ORDER — ROSUVASTATIN CALCIUM 10 MG/1
10 TABLET, COATED ORAL DAILY
Status: DISCONTINUED | OUTPATIENT
Start: 2019-01-01 | End: 2019-01-01 | Stop reason: HOSPADM

## 2019-01-01 RX ORDER — LIDOCAINE 50 MG/G
1 PATCH TOPICAL
Status: DISCONTINUED | OUTPATIENT
Start: 2019-01-01 | End: 2019-01-01 | Stop reason: HOSPADM

## 2019-01-01 RX ORDER — IPRATROPIUM BROMIDE AND ALBUTEROL SULFATE 2.5; .5 MG/3ML; MG/3ML
3 SOLUTION RESPIRATORY (INHALATION) EVERY 4 HOURS
Status: DISCONTINUED | OUTPATIENT
Start: 2019-01-01 | End: 2019-01-01

## 2019-01-01 RX ORDER — ONDANSETRON 2 MG/ML
4 INJECTION INTRAMUSCULAR; INTRAVENOUS
Status: COMPLETED | OUTPATIENT
Start: 2019-01-01 | End: 2019-01-01

## 2019-01-01 RX ORDER — TIZANIDINE HYDROCHLORIDE 4 MG/1
4 CAPSULE, GELATIN COATED ORAL 4 TIMES DAILY PRN
Qty: 10 CAPSULE | Refills: 0 | Status: SHIPPED | OUTPATIENT
Start: 2019-01-01 | End: 2019-01-01

## 2019-01-01 RX ORDER — TIZANIDINE HYDROCHLORIDE 4 MG/1
4 CAPSULE, GELATIN COATED ORAL 4 TIMES DAILY PRN
Qty: 10 CAPSULE | Refills: 0 | Status: ON HOLD | OUTPATIENT
Start: 2019-01-01 | End: 2019-01-01

## 2019-01-01 RX ORDER — FLUCONAZOLE 150 MG/1
150 TABLET ORAL DAILY
Status: DISCONTINUED | OUTPATIENT
Start: 2019-01-01 | End: 2019-01-01 | Stop reason: HOSPADM

## 2019-01-01 RX ORDER — HYDROXYZINE PAMOATE 25 MG/1
25 CAPSULE ORAL ONCE
Status: COMPLETED | OUTPATIENT
Start: 2019-01-01 | End: 2019-01-01

## 2019-01-01 RX ORDER — MELOXICAM 7.5 MG/1
7.5 TABLET ORAL DAILY
Status: DISCONTINUED | OUTPATIENT
Start: 2019-01-01 | End: 2019-01-01 | Stop reason: HOSPADM

## 2019-01-01 RX ORDER — LEVALBUTEROL 1.25 MG/.5ML
1.25 SOLUTION, CONCENTRATE RESPIRATORY (INHALATION)
Status: COMPLETED | OUTPATIENT
Start: 2019-01-01 | End: 2019-01-01

## 2019-01-01 RX ORDER — LACTULOSE 10 G/15ML
30 SOLUTION ORAL DAILY PRN
Qty: 120 ML | Refills: 0 | Status: SHIPPED | OUTPATIENT
Start: 2019-01-01 | End: 2019-01-01

## 2019-01-01 RX ORDER — MELOXICAM 7.5 MG/1
7.5 TABLET ORAL DAILY
Qty: 30 TABLET | Refills: 0 | Status: SHIPPED | OUTPATIENT
Start: 2019-01-01 | End: 2019-01-01

## 2019-01-01 RX ORDER — LEVOFLOXACIN 500 MG/1
500 TABLET, FILM COATED ORAL DAILY
Qty: 3 TABLET | Refills: 0 | Status: ON HOLD | OUTPATIENT
Start: 2019-01-01 | End: 2019-01-01 | Stop reason: HOSPADM

## 2019-01-01 RX ORDER — HYDROCODONE BITARTRATE AND ACETAMINOPHEN 5; 325 MG/1; MG/1
1 TABLET ORAL EVERY 4 HOURS PRN
Status: DISCONTINUED | OUTPATIENT
Start: 2019-01-01 | End: 2019-01-01

## 2019-01-01 RX ORDER — CEFDINIR 125 MG/5ML
14 POWDER, FOR SUSPENSION ORAL 2 TIMES DAILY
Qty: 196 ML | Refills: 0 | Status: SHIPPED | OUTPATIENT
Start: 2019-01-01 | End: 2019-01-01

## 2019-01-01 RX ORDER — IPRATROPIUM BROMIDE AND ALBUTEROL SULFATE 2.5; .5 MG/3ML; MG/3ML
3 SOLUTION RESPIRATORY (INHALATION) EVERY 8 HOURS
Status: DISCONTINUED | OUTPATIENT
Start: 2019-01-01 | End: 2019-01-01 | Stop reason: HOSPADM

## 2019-01-01 RX ORDER — PREDNISONE 20 MG/1
40 TABLET ORAL 2 TIMES DAILY
Status: DISCONTINUED | OUTPATIENT
Start: 2019-01-01 | End: 2019-01-01

## 2019-01-01 RX ORDER — DOCUSATE SODIUM 100 MG/1
100 CAPSULE, LIQUID FILLED ORAL DAILY
Refills: 0 | COMMUNITY
Start: 2019-01-01 | End: 2019-01-01

## 2019-01-01 RX ORDER — LACTULOSE 10 G/15ML
30 SOLUTION ORAL 3 TIMES DAILY PRN
Status: DISCONTINUED | OUTPATIENT
Start: 2019-01-01 | End: 2019-01-01 | Stop reason: HOSPADM

## 2019-01-01 RX ORDER — ASPIRIN 325 MG
325 TABLET ORAL
Status: DISCONTINUED | OUTPATIENT
Start: 2019-01-01 | End: 2019-01-01

## 2019-01-01 RX ORDER — ONDANSETRON 2 MG/ML
4 INJECTION INTRAMUSCULAR; INTRAVENOUS EVERY 8 HOURS PRN
Status: DISCONTINUED | OUTPATIENT
Start: 2019-01-01 | End: 2019-01-01 | Stop reason: HOSPADM

## 2019-01-01 RX ORDER — METHYLPREDNISOLONE SOD SUCC 125 MG
125 VIAL (EA) INJECTION EVERY 8 HOURS
Status: DISCONTINUED | OUTPATIENT
Start: 2019-01-01 | End: 2019-01-01

## 2019-01-01 RX ORDER — LEVOFLOXACIN 5 MG/ML
500 INJECTION, SOLUTION INTRAVENOUS
Status: DISCONTINUED | OUTPATIENT
Start: 2019-01-01 | End: 2019-01-01 | Stop reason: HOSPADM

## 2019-01-01 RX ORDER — FLUCONAZOLE 150 MG/1
150 TABLET ORAL ONCE
Status: COMPLETED | OUTPATIENT
Start: 2019-01-01 | End: 2019-01-01

## 2019-01-01 RX ORDER — PANTOPRAZOLE SODIUM 40 MG/1
40 TABLET, DELAYED RELEASE ORAL DAILY
Status: DISCONTINUED | OUTPATIENT
Start: 2019-01-01 | End: 2019-01-01 | Stop reason: HOSPADM

## 2019-01-01 RX ORDER — HYDROCODONE BITARTRATE AND ACETAMINOPHEN 5; 325 MG/1; MG/1
1 TABLET ORAL EVERY 8 HOURS PRN
Status: DISCONTINUED | OUTPATIENT
Start: 2019-01-01 | End: 2019-01-01 | Stop reason: HOSPADM

## 2019-01-01 RX ORDER — ALBUTEROL SULFATE 2.5 MG/.5ML
2.5 SOLUTION RESPIRATORY (INHALATION) EVERY 4 HOURS PRN
Status: DISCONTINUED | OUTPATIENT
Start: 2019-01-01 | End: 2019-01-01 | Stop reason: HOSPADM

## 2019-01-01 RX ORDER — IBUPROFEN 200 MG
16 TABLET ORAL
Status: DISCONTINUED | OUTPATIENT
Start: 2019-01-01 | End: 2019-01-01 | Stop reason: HOSPADM

## 2019-01-01 RX ORDER — GLUCAGON 1 MG
1 KIT INJECTION
Status: DISCONTINUED | OUTPATIENT
Start: 2019-01-01 | End: 2019-01-01 | Stop reason: HOSPADM

## 2019-01-01 RX ORDER — BUTALBITAL, ACETAMINOPHEN AND CAFFEINE 50; 325; 40 MG/1; MG/1; MG/1
1 TABLET ORAL EVERY 6 HOURS PRN
Status: DISCONTINUED | OUTPATIENT
Start: 2019-01-01 | End: 2019-01-01 | Stop reason: HOSPADM

## 2019-01-01 RX ORDER — TIZANIDINE 2 MG/1
4 TABLET ORAL EVERY 8 HOURS PRN
Status: DISCONTINUED | OUTPATIENT
Start: 2019-01-01 | End: 2019-01-01 | Stop reason: HOSPADM

## 2019-01-01 RX ORDER — BUTALBITAL, ACETAMINOPHEN AND CAFFEINE 50; 325; 40 MG/1; MG/1; MG/1
1 TABLET ORAL 2 TIMES DAILY PRN
Status: DISCONTINUED | OUTPATIENT
Start: 2019-01-01 | End: 2019-01-01 | Stop reason: HOSPADM

## 2019-01-01 RX ORDER — LIDOCAINE 50 MG/G
1 PATCH TOPICAL DAILY
Qty: 30 PATCH | Refills: 0 | Status: SHIPPED | OUTPATIENT
Start: 2019-01-01

## 2019-01-01 RX ORDER — ROSUVASTATIN CALCIUM 10 MG/1
10 TABLET, COATED ORAL NIGHTLY
Status: DISCONTINUED | OUTPATIENT
Start: 2019-01-01 | End: 2019-01-01 | Stop reason: HOSPADM

## 2019-01-01 RX ORDER — IBUPROFEN 200 MG
24 TABLET ORAL
Status: DISCONTINUED | OUTPATIENT
Start: 2019-01-01 | End: 2019-01-01 | Stop reason: HOSPADM

## 2019-01-01 RX ORDER — MORPHINE SULFATE 2 MG/ML
2 INJECTION, SOLUTION INTRAMUSCULAR; INTRAVENOUS
Status: COMPLETED | OUTPATIENT
Start: 2019-01-01 | End: 2019-01-01

## 2019-01-01 RX ORDER — IPRATROPIUM BROMIDE AND ALBUTEROL SULFATE 2.5; .5 MG/3ML; MG/3ML
3 SOLUTION RESPIRATORY (INHALATION) EVERY 6 HOURS PRN
Status: DISCONTINUED | OUTPATIENT
Start: 2019-01-01 | End: 2019-01-01

## 2019-01-01 RX ORDER — DOCUSATE SODIUM 100 MG/1
100 CAPSULE, LIQUID FILLED ORAL DAILY
Status: DISCONTINUED | OUTPATIENT
Start: 2019-01-01 | End: 2019-01-01 | Stop reason: HOSPADM

## 2019-01-01 RX ORDER — METHYLPREDNISOLONE SOD SUCC 125 MG
80 VIAL (EA) INJECTION EVERY 12 HOURS
Status: DISCONTINUED | OUTPATIENT
Start: 2019-01-01 | End: 2019-01-01 | Stop reason: HOSPADM

## 2019-01-01 RX ORDER — PREDNISONE 10 MG/1
10 TABLET ORAL 2 TIMES DAILY
Status: DISCONTINUED | OUTPATIENT
Start: 2019-01-01 | End: 2019-01-01

## 2019-01-01 RX ORDER — INSULIN ASPART 100 [IU]/ML
1-10 INJECTION, SOLUTION INTRAVENOUS; SUBCUTANEOUS
Status: DISCONTINUED | OUTPATIENT
Start: 2019-01-01 | End: 2019-01-01 | Stop reason: HOSPADM

## 2019-01-01 RX ORDER — HYDROCODONE BITARTRATE AND ACETAMINOPHEN 10; 325 MG/1; MG/1
1 TABLET ORAL EVERY 6 HOURS PRN
Qty: 20 TABLET | Refills: 0 | Status: ON HOLD | OUTPATIENT
Start: 2019-01-01 | End: 2019-01-01 | Stop reason: HOSPADM

## 2019-01-01 RX ORDER — ACETAMINOPHEN 325 MG/1
650 TABLET ORAL EVERY 6 HOURS PRN
Status: DISCONTINUED | OUTPATIENT
Start: 2019-01-01 | End: 2019-01-01 | Stop reason: HOSPADM

## 2019-01-01 RX ORDER — HYDROXYZINE PAMOATE 25 MG/1
25 CAPSULE ORAL EVERY 8 HOURS PRN
Qty: 15 CAPSULE | Refills: 0 | Status: SHIPPED | OUTPATIENT
Start: 2019-01-01

## 2019-01-01 RX ORDER — LACTULOSE 10 G/15ML
20 SOLUTION ORAL EVERY 6 HOURS PRN
Status: DISCONTINUED | OUTPATIENT
Start: 2019-01-01 | End: 2019-01-01 | Stop reason: HOSPADM

## 2019-01-01 RX ADMIN — TIZANIDINE 2 MG: 2 TABLET ORAL at 12:07

## 2019-01-01 RX ADMIN — FLUCONAZOLE 150 MG: 150 TABLET ORAL at 11:07

## 2019-01-01 RX ADMIN — MELOXICAM 7.5 MG: 7.5 TABLET ORAL at 08:07

## 2019-01-01 RX ADMIN — IPRATROPIUM BROMIDE AND ALBUTEROL SULFATE 3 ML: .5; 3 SOLUTION RESPIRATORY (INHALATION) at 10:07

## 2019-01-01 RX ADMIN — INSULIN ASPART 6 UNITS: 100 INJECTION, SOLUTION INTRAVENOUS; SUBCUTANEOUS at 08:07

## 2019-01-01 RX ADMIN — CEFTRIAXONE 1 G: 1 INJECTION, SOLUTION INTRAVENOUS at 10:07

## 2019-01-01 RX ADMIN — METHYLPREDNISOLONE SODIUM SUCCINATE 40 MG: 40 INJECTION, POWDER, FOR SOLUTION INTRAMUSCULAR; INTRAVENOUS at 09:07

## 2019-01-01 RX ADMIN — HYDROXYZINE PAMOATE 25 MG: 25 CAPSULE ORAL at 09:07

## 2019-01-01 RX ADMIN — TIZANIDINE 2 MG: 2 TABLET ORAL at 03:07

## 2019-01-01 RX ADMIN — LEVOFLOXACIN 500 MG: 5 INJECTION, SOLUTION INTRAVENOUS at 12:06

## 2019-01-01 RX ADMIN — INSULIN ASPART 4 UNITS: 100 INJECTION, SOLUTION INTRAVENOUS; SUBCUTANEOUS at 04:07

## 2019-01-01 RX ADMIN — ROSUVASTATIN CALCIUM 10 MG: 10 TABLET, FILM COATED ORAL at 08:07

## 2019-01-01 RX ADMIN — METHYLPREDNISOLONE SODIUM SUCCINATE 40 MG: 40 INJECTION, POWDER, FOR SOLUTION INTRAMUSCULAR; INTRAVENOUS at 05:07

## 2019-01-01 RX ADMIN — BUTALBITAL, ACETAMINOPHEN AND CAFFEINE 1 TABLET: 50; 325; 40 TABLET ORAL at 10:07

## 2019-01-01 RX ADMIN — DOCUSATE SODIUM 100 MG: 100 CAPSULE, LIQUID FILLED ORAL at 05:07

## 2019-01-01 RX ADMIN — TIZANIDINE 2 MG: 2 TABLET ORAL at 11:07

## 2019-01-01 RX ADMIN — PANTOPRAZOLE SODIUM 40 MG: 40 TABLET, DELAYED RELEASE ORAL at 08:07

## 2019-01-01 RX ADMIN — METHYLPREDNISOLONE SODIUM SUCCINATE 125 MG: 125 INJECTION, POWDER, FOR SOLUTION INTRAMUSCULAR; INTRAVENOUS at 10:07

## 2019-01-01 RX ADMIN — HYDROXYZINE PAMOATE 25 MG: 25 CAPSULE ORAL at 11:07

## 2019-01-01 RX ADMIN — IPRATROPIUM BROMIDE AND ALBUTEROL SULFATE 3 ML: 2.5; .5 SOLUTION RESPIRATORY (INHALATION) at 08:07

## 2019-01-01 RX ADMIN — METHYLPREDNISOLONE SODIUM SUCCINATE 125 MG: 125 INJECTION, POWDER, FOR SOLUTION INTRAMUSCULAR; INTRAVENOUS at 05:07

## 2019-01-01 RX ADMIN — LIDOCAINE 1 PATCH: 50 PATCH TOPICAL at 11:07

## 2019-01-01 RX ADMIN — POLYMYXIN B SULFATE, BACITRACIN ZINC, NEOMYCIN SULFATE: 5000; 3.5; 4 OINTMENT TOPICAL at 08:07

## 2019-01-01 RX ADMIN — IPRATROPIUM BROMIDE AND ALBUTEROL SULFATE 3 ML: 2.5; .5 SOLUTION RESPIRATORY (INHALATION) at 04:07

## 2019-01-01 RX ADMIN — LIDOCAINE 1 PATCH: 50 PATCH CUTANEOUS at 11:07

## 2019-01-01 RX ADMIN — TIZANIDINE 4 MG: 2 TABLET ORAL at 01:06

## 2019-01-01 RX ADMIN — METHYLPREDNISOLONE SODIUM SUCCINATE 125 MG: 125 INJECTION, POWDER, FOR SOLUTION INTRAMUSCULAR; INTRAVENOUS at 05:06

## 2019-01-01 RX ADMIN — TIZANIDINE 2 MG: 2 TABLET ORAL at 04:07

## 2019-01-01 RX ADMIN — TIZANIDINE 2 MG: 2 TABLET ORAL at 01:07

## 2019-01-01 RX ADMIN — IPRATROPIUM BROMIDE AND ALBUTEROL SULFATE 3 ML: 2.5; .5 SOLUTION RESPIRATORY (INHALATION) at 11:07

## 2019-01-01 RX ADMIN — TIZANIDINE 4 MG: 2 TABLET ORAL at 09:06

## 2019-01-01 RX ADMIN — IPRATROPIUM BROMIDE AND ALBUTEROL SULFATE 3 ML: 2.5; .5 SOLUTION RESPIRATORY (INHALATION) at 12:06

## 2019-01-01 RX ADMIN — TIZANIDINE 2 MG: 2 TABLET ORAL at 07:07

## 2019-01-01 RX ADMIN — BUTALBITAL, ACETAMINOPHEN AND CAFFEINE 1 TABLET: 50; 325; 40 TABLET ORAL at 08:07

## 2019-01-01 RX ADMIN — AMLODIPINE BESYLATE 10 MG: 10 TABLET ORAL at 01:07

## 2019-01-01 RX ADMIN — PREDNISONE 10 MG: 10 TABLET ORAL at 08:07

## 2019-01-01 RX ADMIN — BUTALBITAL, ACETAMINOPHEN AND CAFFEINE 1 TABLET: 50; 325; 40 TABLET ORAL at 07:07

## 2019-01-01 RX ADMIN — IPRATROPIUM BROMIDE AND ALBUTEROL SULFATE 3 ML: 2.5; .5 SOLUTION RESPIRATORY (INHALATION) at 07:07

## 2019-01-01 RX ADMIN — AZITHROMYCIN DIHYDRATE 500 MG: 500 INJECTION, POWDER, LYOPHILIZED, FOR SOLUTION INTRAVENOUS at 01:07

## 2019-01-01 RX ADMIN — IPRATROPIUM BROMIDE AND ALBUTEROL SULFATE 3 ML: 2.5; .5 SOLUTION RESPIRATORY (INHALATION) at 12:07

## 2019-01-01 RX ADMIN — MELOXICAM 7.5 MG: 7.5 TABLET ORAL at 09:07

## 2019-01-01 RX ADMIN — IPRATROPIUM BROMIDE AND ALBUTEROL SULFATE 3 ML: 2.5; .5 SOLUTION RESPIRATORY (INHALATION) at 03:07

## 2019-01-01 RX ADMIN — AMLODIPINE BESYLATE 10 MG: 10 TABLET ORAL at 08:07

## 2019-01-01 RX ADMIN — IPRATROPIUM BROMIDE AND ALBUTEROL SULFATE 3 ML: 2.5; .5 SOLUTION RESPIRATORY (INHALATION) at 04:06

## 2019-01-01 RX ADMIN — ACETAMINOPHEN 650 MG: 325 TABLET ORAL at 08:07

## 2019-01-01 RX ADMIN — PANTOPRAZOLE SODIUM 40 MG: 40 TABLET, DELAYED RELEASE ORAL at 01:07

## 2019-01-01 RX ADMIN — IPRATROPIUM BROMIDE AND ALBUTEROL SULFATE 3 ML: 2.5; .5 SOLUTION RESPIRATORY (INHALATION) at 03:06

## 2019-01-01 RX ADMIN — ROSUVASTATIN CALCIUM 10 MG: 10 TABLET, FILM COATED ORAL at 08:06

## 2019-01-01 RX ADMIN — IPRATROPIUM BROMIDE AND ALBUTEROL SULFATE 3 ML: .5; 3 SOLUTION RESPIRATORY (INHALATION) at 07:07

## 2019-01-01 RX ADMIN — CEFTRIAXONE 1 G: 1 INJECTION, SOLUTION INTRAVENOUS at 11:07

## 2019-01-01 RX ADMIN — TIZANIDINE 2 MG: 2 TABLET ORAL at 08:07

## 2019-01-01 RX ADMIN — ONDANSETRON 4 MG: 2 INJECTION INTRAMUSCULAR; INTRAVENOUS at 03:07

## 2019-01-01 RX ADMIN — BUTALBITAL, ACETAMINOPHEN AND CAFFEINE 1 TABLET: 50; 325; 40 TABLET ORAL at 09:07

## 2019-01-01 RX ADMIN — BUTALBITAL, ACETAMINOPHEN AND CAFFEINE 1 TABLET: 50; 325; 40 TABLET ORAL at 08:06

## 2019-01-01 RX ADMIN — BUTALBITAL, ACETAMINOPHEN AND CAFFEINE 1 TABLET: 50; 325; 40 TABLET ORAL at 01:06

## 2019-01-01 RX ADMIN — METHYLPREDNISOLONE SODIUM SUCCINATE 80 MG: 125 INJECTION, POWDER, FOR SOLUTION INTRAMUSCULAR; INTRAVENOUS at 08:06

## 2019-01-01 RX ADMIN — IPRATROPIUM BROMIDE AND ALBUTEROL SULFATE 3 ML: .5; 3 SOLUTION RESPIRATORY (INHALATION) at 11:07

## 2019-01-01 RX ADMIN — LEVOTHYROXINE SODIUM 125 MCG: 125 TABLET ORAL at 06:06

## 2019-01-01 RX ADMIN — ALBUTEROL SULFATE 2.5 MG: 2.5 SOLUTION RESPIRATORY (INHALATION) at 12:07

## 2019-01-01 RX ADMIN — METHYLPREDNISOLONE SODIUM SUCCINATE 125 MG: 125 INJECTION, POWDER, FOR SOLUTION INTRAMUSCULAR; INTRAVENOUS at 09:06

## 2019-01-01 RX ADMIN — AMLODIPINE BESYLATE 10 MG: 10 TABLET ORAL at 08:06

## 2019-01-01 RX ADMIN — HYDROCODONE BITARTRATE AND ACETAMINOPHEN 1 TABLET: 5; 325 TABLET ORAL at 04:06

## 2019-01-01 RX ADMIN — PANTOPRAZOLE SODIUM 40 MG: 40 TABLET, DELAYED RELEASE ORAL at 08:06

## 2019-01-01 RX ADMIN — LEVOTHYROXINE SODIUM 125 MCG: 125 TABLET ORAL at 06:07

## 2019-01-01 RX ADMIN — PANTOPRAZOLE SODIUM 40 MG: 40 TABLET, DELAYED RELEASE ORAL at 09:06

## 2019-01-01 RX ADMIN — AMLODIPINE BESYLATE 10 MG: 10 TABLET ORAL at 09:07

## 2019-01-01 RX ADMIN — PREDNISONE 10 MG: 10 TABLET ORAL at 09:07

## 2019-01-01 RX ADMIN — LACTULOSE 30 G: 20 SOLUTION ORAL at 09:06

## 2019-01-01 RX ADMIN — HYDROXYZINE PAMOATE 25 MG: 25 CAPSULE ORAL at 03:07

## 2019-01-01 RX ADMIN — METHYLPREDNISOLONE SODIUM SUCCINATE 40 MG: 40 INJECTION, POWDER, FOR SOLUTION INTRAMUSCULAR; INTRAVENOUS at 01:07

## 2019-01-01 RX ADMIN — HYDROXYZINE PAMOATE 25 MG: 25 CAPSULE ORAL at 04:07

## 2019-01-01 RX ADMIN — LACTULOSE 20 G: 20 SOLUTION ORAL at 05:07

## 2019-01-01 RX ADMIN — FLUCONAZOLE 150 MG: 150 TABLET ORAL at 09:06

## 2019-01-01 RX ADMIN — LACTULOSE 20 G: 20 SOLUTION ORAL at 02:07

## 2019-01-01 RX ADMIN — LEVOTHYROXINE SODIUM 125 MCG: 125 TABLET ORAL at 08:07

## 2019-01-01 RX ADMIN — PREDNISONE 40 MG: 20 TABLET ORAL at 08:07

## 2019-01-01 RX ADMIN — HYDROCODONE BITARTRATE AND ACETAMINOPHEN 1 TABLET: 5; 325 TABLET ORAL at 09:06

## 2019-01-01 RX ADMIN — ROSUVASTATIN CALCIUM 10 MG: 10 TABLET, FILM COATED ORAL at 01:07

## 2019-01-01 RX ADMIN — TIZANIDINE 4 MG: 2 TABLET ORAL at 12:06

## 2019-01-01 RX ADMIN — PREDNISONE 40 MG: 20 TABLET ORAL at 10:07

## 2019-01-01 RX ADMIN — TUBERCULIN PURIFIED PROTEIN DERIVATIVE 5 UNITS: 5 INJECTION INTRADERMAL at 10:07

## 2019-01-01 RX ADMIN — MELOXICAM 7.5 MG: 7.5 TABLET ORAL at 01:07

## 2019-01-01 RX ADMIN — ACETAMINOPHEN 650 MG: 325 TABLET ORAL at 12:07

## 2019-01-01 RX ADMIN — MORPHINE SULFATE 2 MG: 2 INJECTION, SOLUTION INTRAMUSCULAR; INTRAVENOUS at 03:07

## 2019-01-01 RX ADMIN — HYDROXYZINE PAMOATE 25 MG: 25 CAPSULE ORAL at 07:07

## 2019-01-01 RX ADMIN — LEVOTHYROXINE SODIUM 125 MCG: 125 TABLET ORAL at 01:07

## 2019-01-01 RX ADMIN — HYDROCODONE BITARTRATE AND ACETAMINOPHEN 1 TABLET: 5; 325 TABLET ORAL at 01:06

## 2019-01-01 RX ADMIN — PANTOPRAZOLE SODIUM 40 MG: 40 TABLET, DELAYED RELEASE ORAL at 09:07

## 2019-01-01 RX ADMIN — AZITHROMYCIN DIHYDRATE 500 MG: 500 INJECTION, POWDER, LYOPHILIZED, FOR SOLUTION INTRAVENOUS at 12:07

## 2019-01-01 RX ADMIN — INSULIN ASPART 6 UNITS: 100 INJECTION, SOLUTION INTRAVENOUS; SUBCUTANEOUS at 12:07

## 2019-01-01 RX ADMIN — BUTALBITAL, ACETAMINOPHEN AND CAFFEINE 1 TABLET: 50; 325; 40 TABLET ORAL at 09:06

## 2019-01-01 RX ADMIN — IPRATROPIUM BROMIDE AND ALBUTEROL SULFATE 3 ML: .5; 3 SOLUTION RESPIRATORY (INHALATION) at 12:07

## 2019-01-01 RX ADMIN — HYDROXYZINE PAMOATE 25 MG: 25 CAPSULE ORAL at 01:07

## 2019-01-01 RX ADMIN — ROSUVASTATIN CALCIUM 10 MG: 10 TABLET, FILM COATED ORAL at 09:07

## 2019-01-01 RX ADMIN — PROMETHAZINE HYDROCHLORIDE 12.5 MG: 25 INJECTION INTRAMUSCULAR; INTRAVENOUS at 07:06

## 2019-01-01 RX ADMIN — BUTALBITAL, ACETAMINOPHEN AND CAFFEINE 1 TABLET: 50; 325; 40 TABLET ORAL at 03:06

## 2019-01-01 RX ADMIN — TIZANIDINE 2 MG: 2 TABLET ORAL at 10:07

## 2019-01-01 RX ADMIN — FLUCONAZOLE 150 MG: 150 TABLET ORAL at 10:06

## 2019-01-01 RX ADMIN — BUTALBITAL, ACETAMINOPHEN AND CAFFEINE 1 TABLET: 50; 325; 40 TABLET ORAL at 06:06

## 2019-01-01 RX ADMIN — HYDROXYZINE PAMOATE 25 MG: 25 CAPSULE ORAL at 02:07

## 2019-01-01 RX ADMIN — METHYLPREDNISOLONE SODIUM SUCCINATE 80 MG: 125 INJECTION, POWDER, FOR SOLUTION INTRAMUSCULAR; INTRAVENOUS at 09:06

## 2019-01-01 RX ADMIN — LEVOTHYROXINE SODIUM 125 MCG: 125 TABLET ORAL at 05:07

## 2019-01-01 RX ADMIN — METHYLPREDNISOLONE SODIUM SUCCINATE 125 MG: 125 INJECTION, POWDER, FOR SOLUTION INTRAMUSCULAR; INTRAVENOUS at 06:07

## 2019-01-01 RX ADMIN — HYDROCODONE BITARTRATE AND ACETAMINOPHEN 1 TABLET: 5; 325 TABLET ORAL at 12:06

## 2019-01-01 RX ADMIN — SODIUM CHLORIDE 1000 ML: 0.9 INJECTION, SOLUTION INTRAVENOUS at 11:07

## 2019-01-01 RX ADMIN — METHYLPREDNISOLONE SODIUM SUCCINATE 125 MG: 125 INJECTION, POWDER, FOR SOLUTION INTRAMUSCULAR; INTRAVENOUS at 09:07

## 2019-01-01 RX ADMIN — LEVALBUTEROL 1.25 MG: 1.25 SOLUTION, CONCENTRATE RESPIRATORY (INHALATION) at 05:06

## 2019-01-01 RX ADMIN — MELOXICAM 7.5 MG: 7.5 TABLET ORAL at 10:07

## 2019-01-01 RX ADMIN — LEVOTHYROXINE SODIUM 125 MCG: 125 TABLET ORAL at 07:07

## 2019-01-01 RX ADMIN — AMLODIPINE BESYLATE 10 MG: 10 TABLET ORAL at 09:06

## 2019-01-01 RX ADMIN — ACETAMINOPHEN 650 MG: 325 TABLET ORAL at 06:07

## 2019-01-01 RX ADMIN — IPRATROPIUM BROMIDE AND ALBUTEROL SULFATE 3 ML: 2.5; .5 SOLUTION RESPIRATORY (INHALATION) at 01:07

## 2019-01-01 RX ADMIN — METHYLPREDNISOLONE SODIUM SUCCINATE 125 MG: 125 INJECTION, POWDER, FOR SOLUTION INTRAMUSCULAR; INTRAVENOUS at 02:07

## 2019-01-01 RX ADMIN — ACETAMINOPHEN 650 MG: 325 TABLET ORAL at 09:07

## 2019-01-01 RX ADMIN — POLYMYXIN B SULFATE, BACITRACIN ZINC, NEOMYCIN SULFATE: 5000; 3.5; 4 OINTMENT TOPICAL at 12:07

## 2019-01-01 RX ADMIN — LEVOTHYROXINE SODIUM 125 MCG: 125 TABLET ORAL at 09:07

## 2019-01-01 RX ADMIN — IPRATROPIUM BROMIDE AND ALBUTEROL SULFATE 3 ML: .5; 3 SOLUTION RESPIRATORY (INHALATION) at 01:07

## 2019-01-01 RX ADMIN — ROSUVASTATIN CALCIUM 10 MG: 10 TABLET, FILM COATED ORAL at 09:06

## 2019-01-01 RX ADMIN — IPRATROPIUM BROMIDE AND ALBUTEROL SULFATE 3 ML: 2.5; .5 SOLUTION RESPIRATORY (INHALATION) at 07:06

## 2019-01-01 RX ADMIN — HYDROXYZINE PAMOATE 25 MG: 25 CAPSULE ORAL at 08:07

## 2019-01-01 RX ADMIN — INSULIN ASPART 4 UNITS: 100 INJECTION, SOLUTION INTRAVENOUS; SUBCUTANEOUS at 12:07

## 2019-01-01 RX ADMIN — IOHEXOL 75 ML: 350 INJECTION, SOLUTION INTRAVENOUS at 03:07

## 2019-01-01 RX ADMIN — PNEUMOCOCCAL VACCINE POLYVALENT 0.5 ML
25; 25; 25; 25; 25; 25; 25; 25; 25; 25; 25; 25; 25; 25; 25; 25; 25; 25; 25; 25; 25; 25; 25 INJECTION, SOLUTION INTRAMUSCULAR; SUBCUTANEOUS at 03:07

## 2019-01-01 RX ADMIN — AZITHROMYCIN DIHYDRATE 500 MG: 500 INJECTION, POWDER, LYOPHILIZED, FOR SOLUTION INTRAVENOUS at 11:07

## 2019-01-01 RX ADMIN — LEVALBUTEROL 1.25 MG: 1.25 SOLUTION, CONCENTRATE RESPIRATORY (INHALATION) at 07:06

## 2019-01-01 RX ADMIN — LEVALBUTEROL 1.25 MG: 1.25 SOLUTION, CONCENTRATE RESPIRATORY (INHALATION) at 08:06

## 2019-05-01 ENCOUNTER — HOSPITAL ENCOUNTER (EMERGENCY)
Facility: HOSPITAL | Age: 69
Discharge: HOME | End: 2019-05-01
Attending: EMERGENCY MEDICINE
Payer: MEDICARE

## 2019-05-01 VITALS
TEMPERATURE: 99 F | RESPIRATION RATE: 20 BRPM | DIASTOLIC BLOOD PRESSURE: 72 MMHG | HEART RATE: 80 BPM | BODY MASS INDEX: 20.49 KG/M2 | SYSTOLIC BLOOD PRESSURE: 120 MMHG | OXYGEN SATURATION: 99 % | HEIGHT: 64 IN | WEIGHT: 120 LBS

## 2019-05-01 DIAGNOSIS — H92.02 LEFT EAR PAIN: Primary | ICD-10-CM

## 2019-05-01 PROCEDURE — 99283 EMERGENCY DEPT VISIT LOW MDM: CPT

## 2019-05-01 RX ORDER — CEFDINIR 300 MG/1
600 CAPSULE ORAL DAILY
Qty: 10 CAPSULE | Refills: 0 | Status: SHIPPED | OUTPATIENT
Start: 2019-05-01 | End: 2019-05-06

## 2019-05-02 NOTE — ED PROVIDER NOTES
Encounter Date: 5/1/2019       History     Chief Complaint   Patient presents with    Otalgia     Pt. c/o left ear pain x 2 days. Pt. has a history of thyroid cancer and left the eustation tube damaged from radiation.      Patient is a 68 year old female with PMHX of HTN, HLD, asthma, and hx of thyroid cancer. She presents to the ED for ear pain. She reports having left ear pain for approximately two days. Rates pain 8/10. Denies relief of pain with OTC drops, unknown which type. Reports similar pain in past. Reports having eustachian tube dysfunction secondary to radiation. Reports associated subjective fever. Reports home oxygen use of 2 L via NC. She denies chills, nausea, vomiting, sob, chest pain, abd pain, dysuria, diarrhea, or constipation. She is a current everyday smoker and denies alcohol use.        Review of patient's allergies indicates:   Allergen Reactions    Benzodiazepines     Lidocaine     Toradol [ketorolac]     Tramadol      Past Medical History:   Diagnosis Date    Asthma     Back problem     Calcaneal spur     Chicken pox 2015    Hypertension     Rheumatic disease     Thyroid cancer      Past Surgical History:   Procedure Laterality Date    HYSTERECTOMY      left knee      THYROIDECTOMY      TONSILLECTOMY       Family History   Problem Relation Age of Onset    Depression Mother     Hypertension Sister     Blindness Neg Hx     Glaucoma Neg Hx     Macular degeneration Neg Hx     Retinal detachment Neg Hx      Social History     Tobacco Use    Smoking status: Current Every Day Smoker     Packs/day: 0.50     Years: 15.00     Pack years: 7.50     Types: Cigarettes    Smokeless tobacco: Never Used   Substance Use Topics    Alcohol use: No    Drug use: No     Review of Systems   Constitutional: Positive for fever (subjective).   HENT: Positive for ear pain. Negative for sore throat.    Respiratory: Negative for shortness of breath.    Cardiovascular: Negative for chest pain.    Gastrointestinal: Negative for abdominal pain, nausea and vomiting.   Genitourinary: Negative for dysuria.   Musculoskeletal: Negative for back pain.   Skin: Negative for rash.   Neurological: Negative for weakness.   Hematological: Does not bruise/bleed easily.       Physical Exam     Initial Vitals [05/01/19 2132]   BP Pulse Resp Temp SpO2   118/76 81 18 99 °F (37.2 °C) 97 %      MAP       --         Physical Exam    Vitals reviewed.  Constitutional: She appears well-developed and well-nourished. No distress.   HENT:   Head: Normocephalic.   Right Ear: External ear and ear canal normal. No tenderness. No mastoid tenderness.   Left Ear: Tympanic membrane, external ear and ear canal normal. No tenderness. No mastoid tenderness.   Unable to visualize TM of right ear due to cerumen.    Eyes: Conjunctivae are normal.   Neck: Normal range of motion.   Cardiovascular: Normal rate and regular rhythm.   No murmur heard.  Pulmonary/Chest: Breath sounds normal. No respiratory distress. She has no wheezes. She has no rales.   Musculoskeletal: Normal range of motion.   Lymphadenopathy:     She has no cervical adenopathy.   Neurological: She is alert and oriented to person, place, and time.   Skin: Skin is warm and dry. No erythema.         ED Course   Procedures  Labs Reviewed - No data to display                APC / Resident Notes:   Patient is a 68 year old female presents to the ED for emergent evaluation of left ear pain.      Differential diagnoses include, but are not limited to: otitis media, otitis externa, cerumen impaction, foreign body, or contact dermatitis.     I have discussed emergency department findings, and plan with the patient. Will discharge home with omnicef per patient request. We do not believe there is any evidence of infection at this time. Instructed to F/U with PCP. Patient verbalizes understanding of plan and agrees. Return precautions given.     I have discussed and reviewed with my supervising  physician.        Clinical Impression:       ICD-10-CM ICD-9-CM   1. Left ear pain H92.02 388.70         Disposition:   Disposition: Discharged  Condition: Stable                        Kaela Merchant PA-C  05/01/19 3625

## 2019-06-14 PROBLEM — J44.1 COPD EXACERBATION: Status: ACTIVE | Noted: 2019-01-01

## 2019-06-14 PROBLEM — E89.0 POSTOPERATIVE HYPOTHYROIDISM: Status: ACTIVE | Noted: 2019-01-01

## 2019-06-14 NOTE — ED TRIAGE NOTES
"68 y.o. female presents to ER ED 05/ED 05   Chief Complaint   Patient presents with    Shortness of Breath    Back Pain   Pt reports SOB and lower back pain, reports that she just moved here from [place] and "wore herself out." No acute distress noted.    "

## 2019-06-14 NOTE — ED PROVIDER NOTES
[facility]                                                 Chief Complaint  68 y.o. female with Shortness of Breath and Back Pain    History of Present Illness  Di Deluca presents to the emergency room with shortness of breath  Patient has recently relocated to the area after being evicted in [place]  Patient went to a friend's house some local area, no air conditioning there  Pt is a long-time COPD pt on oxygen has been smoking for several years  Patient has active wheezing on presentation, on a portable O2 cannister  Patient's ABG shows a CO2 of 90, CO2 retainer from long smoking history    The history is provided by the patient   device was not used during this ER visit  Medical history: Asthma, LBP, spur, chickenpox, HTN, rheumatic disease, thyroid disease  Surgeries: Hysterectomy, knee surgery, thyroidectomy, tonsils  Allergies: Benzodiazepines, Toradol, tramadol, lidocaine    I have reviewed all of this patient's past medical, surgical, family, and social   histories as well as active allergies and medications documented in the  electronic medical record    Review of Systems and Physical Exam      Review of Systems  -- Constitution - no fever, denies fatigue, no weakness, no chills  -- Eyes - no tearing or redness, no visual disturbance  -- Ear, Nose - no tinnitus or earache, no nasal congestion or discharge  -- Mouth,Throat - no sore throat, no toothache, normal voice, normal swallowing  -- Respiratory - shortness of breath, HI, no cough or congestion  -- Cardiovascular - denies chest pain, no palpitations, denies claudication  -- Gastrointestinal - denies abdominal pain, nausea, vomiting, or diarrhea  -- Genitourinary - no dysuria, denies flank pain, no hematuria, no STD risk  -- Musculoskeletal - denies back pain, negative for trauma or injury  -- Neurological - no headache, denies weakness or seizure; no LOC  -- Skin - denies pallor, rash, or changes in  skin. no hives or welts noted  -- Psychiatric - Denies SI or HI, no psychosis or fractured thought noted     Vital Signs  Her oral temperature is 97 °F (36.1 °C).   Her blood pressure is 151/71 and her pulse is 84.   Her respiration is 20 and oxygen saturation is 99%.     Physical Exam  -- Nursing note and vitals reviewed  -- Constitutional: Appears well-developed and well-nourished  -- Head: Atraumatic. Normocephalic. No obvious abnormality  -- Eyes: Pupils are equal and reactive to light. Normal conjunctiva and lids  -- Cardiac: Normal rate, regular rhythm and normal heart sounds  -- Pulmonary: Coarse breath sounds bilaterally with diminished air exchange  -- Abdominal: Soft, no tenderness. Normal bowel sounds. Normal liver edge  -- Musculoskeletal: Normal range of motion, no effusions. Joints stable   -- Neurological: No focal deficits. Showed good interaction with staff  -- Vascular: Posterior tibial, dorsalis pedis and radial pulses 2+ bilaterally      Emergency Room Course      Lab Results   (L)   K 3.6   CL 90 (L)   CO2 36 (H)   BUN 12   CREATININE 0.7      ALKPHOS 96   AST 27   ALT 22   BILITOT 0.1   ALBUMIN 4.0   PROT 7.5   WBC 4.08   HGB 12.2   HCT 39.7            CPKMB 3.5   TROPONINI <0.006   INR 0.9   BNP 79   LACTATE 0.6   MG 2.0   TSH 8.561 (H)     Arterial blood gas   PH 7.32*   PCO2 92*   PO2 114*   HCO3 47.40*   PO2 98.9   BE 17.00*      EKG   -- The EKG findings today were without concerning findings from baseline    Chest x-ray  -- severe COPD changes    Additional Work up  -- Blood cultures have also been drawn, results are pending    Medications Given  methylPREDNISolone sodium succinate injection 125 mg (125 mg Intravenous Given 6/14/19 1732)   levalbuterol nebulizer solution 1.25 mg (has no administration in time range)   levalbuterol nebulizer solution 1.25 mg (1.25 mg Nebulization Given 6/14/19 1731)     MDM  Number of Diagnoses or Management Options  COPD  exacerbation: new, needed workup  SOB (shortness of breath): new, needed workup     Amount and/or Complexity of Data Reviewed  Clinical lab tests: ordered and reviewed  Tests in the radiology section of CPT®: ordered and reviewed  Tests in the medicine section of CPT®: reviewed and ordered  Discuss the patient with other providers: yes    Risk of Complications, Morbidity, and/or Mortality  Presenting problems: moderate  Diagnostic procedures: moderate  Management options: moderate       Diagnosis  -- The primary encounter diagnosis was COPD exacerbation.   -- A diagnosis of SOB (shortness of breath) was also pertinent to this visit.    Disposition and Plan  -- Disposition: admit  -- Condition: stable    This note is dictated on M*Modal word recognition program.  There are word recognition mistakes that are occasionally missed on review.         Pal Helton MD  06/14/19 2220

## 2019-06-15 PROBLEM — J96.02 ACUTE RESPIRATORY FAILURE WITH HYPOXIA AND HYPERCARBIA: Status: ACTIVE | Noted: 2019-01-01

## 2019-06-15 PROBLEM — J96.01 ACUTE RESPIRATORY FAILURE WITH HYPOXIA AND HYPERCARBIA: Status: ACTIVE | Noted: 2019-01-01

## 2019-06-15 NOTE — PROGRESS NOTES
06/14/19 2200   Preset CPAP/BiPAP Settings   Mode Of Delivery Standby   $ Is patient using? No/refused  (patient attempted towear, then refused.said felt smothering)   Reason patient is not wearing? Patient refused   Who was contacted if refused? Other (comment)  (David RN)

## 2019-06-15 NOTE — EICU
Video rounding completed.  Pt sitting up in bed w/ Bipap 30% in use.  Pt talkative. No distress noted.  B/P 97/64, HR 87, RR 21, Sat 97%.

## 2019-06-15 NOTE — H&P
[facility]  History & Physical    Patient Name: Di Deluca  MRN: [id number]  Admission Date: 6/14/2019  Attending Physician: Claudia Higginbotham MD   Primary Care Provider: Thomas Huertas MD         Patient information was obtained from patient and ER records.     Subjective:     Principal Problem:COPD exacerbation    Chief Complaint:   Chief Complaint   Patient presents with    Shortness of Breath    Back Pain        HPI: Patient presented to ER with SOB. Sx started 2 days ago. She is visiting relatives in our area but she lives out of town. Long history of tobacco use and COPD. She has had multiple admissions for COPD exacerbations. She is still smoking: reports 1/2 PPD for 14 years. She began feeling more SOB, worsening cough and wheezing over last 48 hours. She has home O2 at 2L NC, self increased to 6L before arrival to ER. In ER she was lethargic. ABG showed pCO2 92. Chart review shows this has been a recurrent problem for the patient. She is not on any maintenance inhalers, just PRN albuterol which she uses 4x daily. Admitted to ICU for continuous BiPaP, nebs, steroids. CXR without consolidation; no fevers or leukocytosis. She is lethargic, sometimes drifts off in middle of her sentences but overall appropriate and answering questions.     She is refusing to wear BiPaP this evening. Wanting pain medications.     Past Medical History:   Diagnosis Date    Asthma     Back problem     Calcaneal spur     Chicken pox 2015    Hypertension     Rheumatic disease     Thyroid cancer        Past Surgical History:   Procedure Laterality Date    HYSTERECTOMY      left knee      THYROIDECTOMY      TONSILLECTOMY         Review of patient's allergies indicates:   Allergen Reactions    Benzodiazepines     Lidocaine     Toradol [ketorolac]     Tramadol        No current facility-administered medications on file prior to encounter.      Current Outpatient Medications on File  Prior to Encounter   Medication Sig    acetaminophen-codeine 300-30mg (TYLENOL #3) 300-30 mg Tab Take by mouth.    amlodipine (NORVASC) 10 MG tablet Take 1 tablet (10 mg total) by mouth once daily.    butalbital-aspirin-caffeine -40 mg (FIORINAL) -40 mg Cap Take 1 capsule by mouth every 4 (four) hours as needed.    levothyroxine (SYNTHROID) 125 MCG tablet Take 125 mcg by mouth once daily.    lisinopril 10 MG tablet Take 40 mg by mouth once daily.     albuterol 90 mcg/actuation inhaler Inhale 2 puffs into the lungs every 6 (six) hours as needed for Wheezing. Rescue    rosuvastatin (CRESTOR) 10 MG tablet Take 10 mg by mouth once daily.     Family History     Problem Relation (Age of Onset)    Depression Mother    Hypertension Sister        Tobacco Use    Smoking status: Current Every Day Smoker     Packs/day: 0.50     Years: 15.00     Pack years: 7.50     Types: Cigarettes    Smokeless tobacco: Never Used   Substance and Sexual Activity    Alcohol use: No    Drug use: No    Sexual activity: Never     Review of Systems   Constitutional: Negative for activity change, fatigue, fever and unexpected weight change.   HENT: Negative for congestion, ear pain, hearing loss, rhinorrhea and sore throat.    Eyes: Negative for redness and visual disturbance.   Respiratory: Positive for cough, shortness of breath and wheezing.    Cardiovascular: Negative for chest pain, palpitations and leg swelling.   Gastrointestinal: Negative for abdominal pain, constipation, diarrhea, nausea and vomiting.   Genitourinary: Negative for dysuria, frequency and urgency.   Musculoskeletal: Negative for back pain, joint swelling and neck pain.   Skin: Negative for color change, rash and wound.   Neurological: Negative for dizziness, tremors, weakness, light-headedness and headaches.     Objective:     Vital Signs (Most Recent):  Temp: 96.6 °F (35.9 °C) (06/14/19 1906)  Pulse: 77 (06/14/19 2100)  Resp: (!) 22 (06/14/19  2100)  BP: (!) 143/82 (06/14/19 2100)  SpO2: (!) 93 % (06/14/19 2100) Vital Signs (24h Range):  Temp:  [96.6 °F (35.9 °C)-97 °F (36.1 °C)] 96.6 °F (35.9 °C)  Pulse:  [75-94] 77  Resp:  [20-22] 22  SpO2:  [93 %-99 %] 93 %  BP: (131-162)/(70-85) 143/82     Weight: 48.5 kg (107 lb)  Body mass index is 18.37 kg/m².    Physical Exam   Constitutional: She is oriented to person, place, and time. She appears well-developed and well-nourished. No distress.   HENT:   Head: Normocephalic and atraumatic.   Right Ear: External ear normal.   Left Ear: External ear normal.   Eyes: Pupils are equal, round, and reactive to light. Conjunctivae and EOM are normal. Right eye exhibits no discharge. Left eye exhibits no discharge.   Neck: Neck supple. No tracheal deviation present.   Cardiovascular: Normal rate and regular rhythm. Exam reveals no friction rub.   No murmur heard.  Pulmonary/Chest: Effort normal and breath sounds normal. No respiratory distress. She has no wheezes. She has no rales.   Abdominal: Soft. Bowel sounds are normal. She exhibits no distension. There is no tenderness.   Neurological: She is alert and oriented to person, place, and time. No cranial nerve deficit.   Skin: Skin is warm and dry.   Psychiatric: She has a normal mood and affect. Her behavior is normal.   Nursing note and vitals reviewed.        CRANIAL NERVES     CN III, IV, VI   Pupils are equal, round, and reactive to light.  Extraocular motions are normal.        Significant Labs:   CBC:   Recent Labs   Lab 06/14/19  1736   WBC 4.08   HGB 12.2   HCT 39.7        CMP:   Recent Labs   Lab 06/14/19  1736   *   K 3.6   CL 90*   CO2 36*      BUN 12   CREATININE 0.7   CALCIUM 10.6*   PROT 7.5   ALBUMIN 4.0   BILITOT 0.1   ALKPHOS 96   AST 27   ALT 22   ANIONGAP 9   EGFRNONAA >60     Lactic Acid:   Recent Labs   Lab 06/14/19  1736 06/14/19  2128   LACTATE 0.6 0.6     Troponin:   Recent Labs   Lab 06/14/19  1736   TROPONINI <0.006  "    Urine Studies: No results for input(s): COLORU, APPEARANCEUA, PHUR, SPECGRAV, PROTEINUA, GLUCUA, KETONESU, BILIRUBINUA, OCCULTUA, NITRITE, UROBILINOGEN, LEUKOCYTESUR, RBCUA, WBCUA, BACTERIA, SQUAMEPITHEL, HYALINECASTS in the last 48 hours.    Invalid input(s): WRIGHTSUR  All pertinent labs within the past 24 hours have been reviewed.    Significant Imaging: I have reviewed all pertinent imaging results/findings within the past 24 hours.     CXR 6/14/19: "Stable chest x-ray with no acute finding"    Assessment/Plan:     * COPD exacerbation  Long time smoker with several admissions for COPD exacerbations  Has had pCOS in 90s in the past  On past admissions BiPaP noncompliant as well  Had a long discussion with patient about importance of BiPaP. Needs it to improve and to prevent worsening. Could result in intubation if worsening. Will try to wear again  She is lethargic but appropriate and oriented  She self increased her home O2 to 6L prior to arrival which also likely contributing to her retaining. Down to 2L NC and maintains sats > 92%. OK to keep around 88-92% right now  duonebs  Steroids  No antibx for now      Tobacco abuse  cessatoin counseling completed on admission      Essential hypertension  Cont home amlodipine      Back pain  Chronic pain issues  She is not prescribed opioids as outpatient per last few notes in chart.  UDS pending   Careful use of narcotics due to mental status/respiratory status      History of thyroid cancer  S/p surgery and now on synthroid for hypothyroidism        VTE Risk Mitigation (From admission, onward)        Ordered     IP VTE LOW RISK PATIENT  Once      06/14/19 1857     Place sequential compression device  Until discontinued      06/14/19 1857        Critical care time spent on the evaluation and treatment of severe organ dysfunction, review of pertinent labs and imaging studies, discussions with consulting providers and discussions with patient/family: 42 minutes. "     Claudia Higginbotham MD  Department of Hospital Medicine   [facility]

## 2019-06-15 NOTE — SUBJECTIVE & OBJECTIVE
Past Medical History:   Diagnosis Date    Asthma     Back problem     Calcaneal spur     Chicken pox 2015    Hypertension     Rheumatic disease     Thyroid cancer        Past Surgical History:   Procedure Laterality Date    HYSTERECTOMY      left knee      THYROIDECTOMY      TONSILLECTOMY         Review of patient's allergies indicates:   Allergen Reactions    Benzodiazepines     Lidocaine     Toradol [ketorolac]     Tramadol        No current facility-administered medications on file prior to encounter.      Current Outpatient Medications on File Prior to Encounter   Medication Sig    acetaminophen-codeine 300-30mg (TYLENOL #3) 300-30 mg Tab Take by mouth.    amlodipine (NORVASC) 10 MG tablet Take 1 tablet (10 mg total) by mouth once daily.    butalbital-aspirin-caffeine -40 mg (FIORINAL) -40 mg Cap Take 1 capsule by mouth every 4 (four) hours as needed.    levothyroxine (SYNTHROID) 125 MCG tablet Take 125 mcg by mouth once daily.    lisinopril 10 MG tablet Take 40 mg by mouth once daily.     albuterol 90 mcg/actuation inhaler Inhale 2 puffs into the lungs every 6 (six) hours as needed for Wheezing. Rescue    rosuvastatin (CRESTOR) 10 MG tablet Take 10 mg by mouth once daily.     Family History     Problem Relation (Age of Onset)    Depression Mother    Hypertension Sister        Tobacco Use    Smoking status: Current Every Day Smoker     Packs/day: 0.50     Years: 15.00     Pack years: 7.50     Types: Cigarettes    Smokeless tobacco: Never Used   Substance and Sexual Activity    Alcohol use: No    Drug use: No    Sexual activity: Never     Review of Systems   Constitutional: Negative for activity change, fatigue, fever and unexpected weight change.   HENT: Negative for congestion, ear pain, hearing loss, rhinorrhea and sore throat.    Eyes: Negative for redness and visual disturbance.   Respiratory: Positive for cough, shortness of breath and wheezing.    Cardiovascular:  Negative for chest pain, palpitations and leg swelling.   Gastrointestinal: Negative for abdominal pain, constipation, diarrhea, nausea and vomiting.   Genitourinary: Negative for dysuria, frequency and urgency.   Musculoskeletal: Negative for back pain, joint swelling and neck pain.   Skin: Negative for color change, rash and wound.   Neurological: Negative for dizziness, tremors, weakness, light-headedness and headaches.     Objective:     Vital Signs (Most Recent):  Temp: 96.6 °F (35.9 °C) (06/14/19 1906)  Pulse: 77 (06/14/19 2100)  Resp: (!) 22 (06/14/19 2100)  BP: (!) 143/82 (06/14/19 2100)  SpO2: (!) 93 % (06/14/19 2100) Vital Signs (24h Range):  Temp:  [96.6 °F (35.9 °C)-97 °F (36.1 °C)] 96.6 °F (35.9 °C)  Pulse:  [75-94] 77  Resp:  [20-22] 22  SpO2:  [93 %-99 %] 93 %  BP: (131-162)/(70-85) 143/82     Weight: 48.5 kg (107 lb)  Body mass index is 18.37 kg/m².    Physical Exam   Constitutional: She is oriented to person, place, and time. She appears well-developed and well-nourished. No distress.   HENT:   Head: Normocephalic and atraumatic.   Right Ear: External ear normal.   Left Ear: External ear normal.   Eyes: Pupils are equal, round, and reactive to light. Conjunctivae and EOM are normal. Right eye exhibits no discharge. Left eye exhibits no discharge.   Neck: Neck supple. No tracheal deviation present.   Cardiovascular: Normal rate and regular rhythm. Exam reveals no friction rub.   No murmur heard.  Pulmonary/Chest: Effort normal and breath sounds normal. No respiratory distress. She has no wheezes. She has no rales.   Abdominal: Soft. Bowel sounds are normal. She exhibits no distension. There is no tenderness.   Neurological: She is alert and oriented to person, place, and time. No cranial nerve deficit.   Skin: Skin is warm and dry.   Psychiatric: She has a normal mood and affect. Her behavior is normal.   Nursing note and vitals reviewed.        CRANIAL NERVES     CN III, IV, VI   Pupils are equal,  "round, and reactive to light.  Extraocular motions are normal.        Significant Labs:   CBC:   Recent Labs   Lab 06/14/19  1736   WBC 4.08   HGB 12.2   HCT 39.7        CMP:   Recent Labs   Lab 06/14/19  1736   *   K 3.6   CL 90*   CO2 36*      BUN 12   CREATININE 0.7   CALCIUM 10.6*   PROT 7.5   ALBUMIN 4.0   BILITOT 0.1   ALKPHOS 96   AST 27   ALT 22   ANIONGAP 9   EGFRNONAA >60     Lactic Acid:   Recent Labs   Lab 06/14/19  1736 06/14/19  2128   LACTATE 0.6 0.6     Troponin:   Recent Labs   Lab 06/14/19  1736   TROPONINI <0.006     Urine Studies: No results for input(s): COLORU, APPEARANCEUA, PHUR, SPECGRAV, PROTEINUA, GLUCUA, KETONESU, BILIRUBINUA, OCCULTUA, NITRITE, UROBILINOGEN, LEUKOCYTESUR, RBCUA, WBCUA, BACTERIA, SQUAMEPITHEL, HYALINECASTS in the last 48 hours.    Invalid input(s): WRIGHTSUR  All pertinent labs within the past 24 hours have been reviewed.    Significant Imaging: I have reviewed all pertinent imaging results/findings within the past 24 hours.     CXR 6/14/19: "Stable chest x-ray with no acute finding"  "

## 2019-06-15 NOTE — SUBJECTIVE & OBJECTIVE
Interval History: feeling better, refuses BiPaP    Review of Systems   Constitutional: Negative for activity change, fatigue, fever and unexpected weight change.   HENT: Negative for congestion, ear pain, hearing loss, rhinorrhea, sore throat and tinnitus.    Eyes: Negative for pain, redness and visual disturbance.   Respiratory: Positive for wheezing. Negative for cough and shortness of breath.    Cardiovascular: Negative for chest pain, palpitations and leg swelling.   Gastrointestinal: Positive for constipation. Negative for abdominal pain, blood in stool, diarrhea, nausea and vomiting.   Genitourinary: Negative for dysuria, frequency, pelvic pain and urgency.   Musculoskeletal: Positive for back pain. Negative for joint swelling and neck pain.   Skin: Negative for color change, rash and wound.   Neurological: Negative for dizziness, tremors, weakness, light-headedness and headaches.     Objective:     Vital Signs (Most Recent):  Temp: 98.8 °F (37.1 °C) (06/15/19 0800)  Pulse: 100 (06/15/19 0832)  Resp: (!) 21 (06/15/19 0832)  BP: 139/83 (06/15/19 0800)  SpO2: 95 % (06/15/19 0832) Vital Signs (24h Range):  Temp:  [96 °F (35.6 °C)-98.8 °F (37.1 °C)] 98.8 °F (37.1 °C)  Pulse:  [] 100  Resp:  [15-28] 21  SpO2:  [93 %-99 %] 95 %  BP: ()/(55-85) 139/83     Weight: 48.5 kg (107 lb)  Body mass index is 18.37 kg/m².    Intake/Output Summary (Last 24 hours) at 6/15/2019 0906  Last data filed at 6/15/2019 0800  Gross per 24 hour   Intake 50 ml   Output 350 ml   Net -300 ml      Physical Exam   Constitutional: She is oriented to person, place, and time. She appears well-developed and well-nourished. No distress.   HENT:   Head: Normocephalic and atraumatic.   Right Ear: External ear normal.   Left Ear: External ear normal.   Eyes: Pupils are equal, round, and reactive to light. Conjunctivae and EOM are normal. Right eye exhibits no discharge. Left eye exhibits no discharge.   Neck: Neck supple. No tracheal  "deviation present.   Cardiovascular: Normal rate and regular rhythm.   No murmur heard.  Pulmonary/Chest: Effort normal. No respiratory distress. She has no wheezes. She has no rales.   Less wheezing  Still poor airflow b/l   Abdominal: Soft. Bowel sounds are normal. She exhibits no distension. There is no tenderness.   Neurological: She is alert and oriented to person, place, and time. No cranial nerve deficit.   Skin: Skin is warm and dry.   Psychiatric: She has a normal mood and affect. Her behavior is normal.   Nursing note and vitals reviewed.      Significant Labs:   CBC:   Recent Labs   Lab 06/14/19 1736 06/15/19  0357   WBC 4.08 2.21*   HGB 12.2 12.4   HCT 39.7 40.3    248     CMP:   Recent Labs   Lab 06/14/19 1736 06/15/19  0357   * 137   K 3.6 5.0   CL 90* 89*   CO2 36* 38*    151*   BUN 12 11   CREATININE 0.7 0.7   CALCIUM 10.6* 9.7   PROT 7.5 6.7   ALBUMIN 4.0 3.5   BILITOT 0.1 0.1   ALKPHOS 96 83   AST 27 22   ALT 22 19   ANIONGAP 9 10   EGFRNONAA >60 >60     TSH:   Recent Labs   Lab 06/14/19  1736   TSH 8.561*     Urine Studies:   Recent Labs   Lab 06/15/19  0036   COLORU Yellow   APPEARANCEUA Clear   PHUR 6.0   SPECGRAV 1.015   PROTEINUA Negative   GLUCUA Negative   KETONESU Negative   BILIRUBINUA Negative   OCCULTUA Negative   NITRITE Negative   UROBILINOGEN Negative   LEUKOCYTESUR Negative       Significant Imaging: I have reviewed all pertinent imaging results/findings within the past 24 hours.     CXR 6/14/19: "Stable chest x-ray with no acute finding"  "

## 2019-06-15 NOTE — PROGRESS NOTES
"[facility] Hospital Medicine  Progress Note    Patient Name: Di Deluca  MRN: [id number]  Patient Class: IP- Inpatient   Admission Date: 6/14/2019  Length of Stay: 1 days  Attending Physician: Claudia Higginbotham MD  Primary Care Provider: Thomas Huertas MD        Subjective:     Principal Problem:COPD exacerbation      HPI:  Patient presented to ER with SOB. Sx started 2 days ago. She is visiting relatives in our area but she lives out of town. Long history of tobacco use and COPD. She has had multiple admissions for COPD exacerbations. She is still smoking: reports 1/2 PPD for 14 years. She began feeling more SOB, worsening cough and wheezing over last 48 hours. She has home O2 at 2L NC, self increased to 6L before arrival to ER. In ER she was lethargic. ABG showed pCO2 92. Chart review shows this has been a recurrent problem for the patient. She is not on any maintenance inhalers, just PRN albuterol which she uses 4x daily. Admitted to ICU for continuous BiPaP, nebs, steroids. CXR without consolidation; no fevers or leukocytosis. She is lethargic, sometimes drifts off in middle of her sentences but overall appropriate and answering questions.     She is refusing to wear BiPaP this evening. Wanting pain medications.     Overview/Hospital Course:  Patient is more alert this AM. She did not wear BiPaP "i'm smothering". ABG 7.37/83/66--only mild improvement. Remains on steroids and nebs. She reports feeling better. She c/o muscle spasms of back, wants Zanaflex because it "is the only thing that works". She also asking for lactulose. No BM 6 days. Denies abd pain, n/v. Eating ok. UDS + marian (prescribed fioricet for pain, headaches).     Interval History: feeling better, refuses BiPaP    Review of Systems   Constitutional: Negative for activity change, fatigue, fever and unexpected weight change.   HENT: Negative for congestion, ear pain, hearing loss, rhinorrhea, sore throat and tinnitus.    Eyes: " Negative for pain, redness and visual disturbance.   Respiratory: Positive for wheezing. Negative for cough and shortness of breath.    Cardiovascular: Negative for chest pain, palpitations and leg swelling.   Gastrointestinal: Positive for constipation. Negative for abdominal pain, blood in stool, diarrhea, nausea and vomiting.   Genitourinary: Negative for dysuria, frequency, pelvic pain and urgency.   Musculoskeletal: Positive for back pain. Negative for joint swelling and neck pain.   Skin: Negative for color change, rash and wound.   Neurological: Negative for dizziness, tremors, weakness, light-headedness and headaches.     Objective:     Vital Signs (Most Recent):  Temp: 98.8 °F (37.1 °C) (06/15/19 0800)  Pulse: 100 (06/15/19 0832)  Resp: (!) 21 (06/15/19 0832)  BP: 139/83 (06/15/19 0800)  SpO2: 95 % (06/15/19 0832) Vital Signs (24h Range):  Temp:  [96 °F (35.6 °C)-98.8 °F (37.1 °C)] 98.8 °F (37.1 °C)  Pulse:  [] 100  Resp:  [15-28] 21  SpO2:  [93 %-99 %] 95 %  BP: ()/(55-85) 139/83     Weight: 48.5 kg (107 lb)  Body mass index is 18.37 kg/m².    Intake/Output Summary (Last 24 hours) at 6/15/2019 0906  Last data filed at 6/15/2019 0800  Gross per 24 hour   Intake 50 ml   Output 350 ml   Net -300 ml      Physical Exam   Constitutional: She is oriented to person, place, and time. She appears well-developed and well-nourished. No distress.   HENT:   Head: Normocephalic and atraumatic.   Right Ear: External ear normal.   Left Ear: External ear normal.   Eyes: Pupils are equal, round, and reactive to light. Conjunctivae and EOM are normal. Right eye exhibits no discharge. Left eye exhibits no discharge.   Neck: Neck supple. No tracheal deviation present.   Cardiovascular: Normal rate and regular rhythm.   No murmur heard.  Pulmonary/Chest: Effort normal. No respiratory distress. She has no wheezes. She has no rales.   Less wheezing  Still poor airflow b/l   Abdominal: Soft. Bowel sounds are normal. She  "exhibits no distension. There is no tenderness.   Neurological: She is alert and oriented to person, place, and time. No cranial nerve deficit.   Skin: Skin is warm and dry.   Psychiatric: She has a normal mood and affect. Her behavior is normal.   Nursing note and vitals reviewed.      Significant Labs:   CBC:   Recent Labs   Lab 06/14/19  1736 06/15/19  0357   WBC 4.08 2.21*   HGB 12.2 12.4   HCT 39.7 40.3    248     CMP:   Recent Labs   Lab 06/14/19  1736 06/15/19  0357   * 137   K 3.6 5.0   CL 90* 89*   CO2 36* 38*    151*   BUN 12 11   CREATININE 0.7 0.7   CALCIUM 10.6* 9.7   PROT 7.5 6.7   ALBUMIN 4.0 3.5   BILITOT 0.1 0.1   ALKPHOS 96 83   AST 27 22   ALT 22 19   ANIONGAP 9 10   EGFRNONAA >60 >60     TSH:   Recent Labs   Lab 06/14/19  1736   TSH 8.561*     Urine Studies:   Recent Labs   Lab 06/15/19  0036   COLORU Yellow   APPEARANCEUA Clear   PHUR 6.0   SPECGRAV 1.015   PROTEINUA Negative   GLUCUA Negative   KETONESU Negative   BILIRUBINUA Negative   OCCULTUA Negative   NITRITE Negative   UROBILINOGEN Negative   LEUKOCYTESUR Negative       Significant Imaging: I have reviewed all pertinent imaging results/findings within the past 24 hours.     CXR 6/14/19: "Stable chest x-ray with no acute finding"      Assessment/Plan:      * COPD exacerbation  Long time smoker with several admissions for COPD exacerbations  Has had pCO2 in 90s in the past  On past admissions BiPaP noncompliant as well  Had a long discussion with patient about importance of BiPaP. Needs it to improve and to prevent worsening. Could result in intubation if worsening. Will try to wear again today  Her mental status is improved--I suspect despite high Co2 she is not far from her baseline  She self increased her home O2 to 6L prior to arrival which also likely contributed to her retaining. Down to 2L NC and maintains sats > 92%. OK to keep around 88-92% right now  duonebs  Steroids  No antibx for now      Tobacco " abuse  cessatoin counseling completed on admission      Essential hypertension  Cont home amlodipine      Back pain  Chronic pain issues  She is not prescribed opioids as outpatient per last few notes in chart.  UDS + marian (prescribed fioricet)  Careful use of narcotics due to mental status/respiratory status\  Zanaflex PRN started but watch for sedation      History of thyroid cancer  S/p surgery and now on synthroid for hypothyroidism        VTE Risk Mitigation (From admission, onward)        Ordered     IP VTE LOW RISK PATIENT  Once      06/14/19 1857     Place sequential compression device  Until discontinued      06/14/19 1857          Critical care time spent on the evaluation and treatment of severe organ dysfunction, review of pertinent labs and imaging studies, discussions with consulting providers and discussions with patient/family: 38 minutes.      Claudia Higginbotham MD  Department of Hospital Medicine   [facility]

## 2019-06-15 NOTE — PROGRESS NOTES
06/14/19 1932   Preset CPAP/BiPAP Settings   Mode Of Delivery Standby   $ Is patient using? No/refused  (patient will try to wear while she sleeps)   Reason patient is not wearing? Patient refused   Who was contacted if refused? Other (comment)  (BAYRON Hernandez)

## 2019-06-15 NOTE — EICU
Video assessment done, patient awake and voices no concerns or questions at this time.  bipap machine at bedside

## 2019-06-15 NOTE — NURSING
Assisted out of bed to bedside toilet for BM. Patient very short of breath on exertion. O2 in use per nasal cannula at 2 lpm. HR 95, resp rate 36, O2 sat 95%. Upon returning to bed respirations labored with pursed lips respiratory pattern noted. Took several minutes to recover from activity. BM successful. Once settled, placed on BiPAP as ordered.

## 2019-06-15 NOTE — HOSPITAL COURSE
"Patient is more alert this AM. She did not wear BiPaP "i'm smothering". ABG 7.37/83/66--only mild improvement. Remains on steroids and nebs. She reports feeling better. She c/o muscle spasms of back, wants Zanaflex because it "is the only thing that works". She also asking for lactulose. No BM 6 days. Denies abd pain, n/v. Eating ok. UDS + marian (prescribed fioricet for pain, headaches).     6/16: worse BiPaP yesterday but not overnight. States she is feeling better but "I need one more days" Tells me she must be discharged tomorrow to pay rent for her PO Box so her mail doesn't get sent back. Given lactulose yesterday for 6 days constipation; had BM. Denies abd pain, n/v. She is using fioricet and Zanaflex PRN for pain without sedation.     6/17 She remains on O2 which she does have at home. Sats 96% on 3L NC. Unable to tolerate bipap but pCO2 trended down to her baseline. Afebrile. No elevated WBC. She is still insisting on dishcharge today to take care of personal business.   "

## 2019-06-15 NOTE — ASSESSMENT & PLAN NOTE
Chronic pain issues  She is not prescribed opioids as outpatient per last few notes in chart.  UDS pending   Careful use of narcotics due to mental status/respiratory status

## 2019-06-15 NOTE — HPI
Patient presented to ER with SOB. Sx started 2 days ago. She is visiting relatives in our area but she lives out of town. Long history of tobacco use and COPD. She has had multiple admissions for COPD exacerbations. She is still smoking: reports 1/2 PPD for 14 years. She began feeling more SOB, worsening cough and wheezing over last 48 hours. She has home O2 at 2L NC, self increased to 6L before arrival to ER. In ER she was lethargic. ABG showed pCO2 92. Chart review shows this has been a recurrent problem for the patient. She is not on any maintenance inhalers, just PRN albuterol which she uses 4x daily. Admitted to ICU for continuous BiPaP, nebs, steroids. CXR without consolidation; no fevers or leukocytosis. She is lethargic, sometimes drifts off in middle of her sentences but overall appropriate and answering questions.     She is refusing to wear BiPaP this evening. Wanting pain medications.

## 2019-06-15 NOTE — PROGRESS NOTES
Staff Handoff  Bedside report received from BAYRON Hernandez. Patient resting quietly in bed, watching TV. Sinus rhythm noted on cardiac monitor, respirations somewhat labored with use of accessory muscles noted, resp rate 24/min. O2 sat 94%. Assessment per flow sheet.      Resident Handoff

## 2019-06-15 NOTE — CONSULTS
[facility]  Pulmonology  Consult Note    Patient Name: Di Deluca  MRN: [id number]  Admission Date: 6/14/2019  Hospital Length of Stay: 1 days  Code Status: Full Code  Attending Physician: Claudia Higginbotham MD  Primary Care Provider: Thomas Huertas MD   Principal Problem: COPD exacerbation    Consults  Subjective:     HPI:  Di Deluca is a 68 y.o. year old female that's presents with a chief complaint of SOB and Wheezing for several  Days.PATIENT WAS MOVING HER BELONGINGS to a new domicile and a bug bomb caused extreme sob and wheezing . She wears o2 and was admitted for Respiratory failure both hypoxia and hypercapnia . She has an increased cough and increased sputum production which in 66 to 78% of the time in a copd patient means infection with secondary mucous production . Consulted to evaluate Respiratory status.    Past Medical History:   Diagnosis Date    Asthma     Back problem     Calcaneal spur     Chicken pox 2015    Hypertension     Rheumatic disease     Thyroid cancer         Past Surgical History:   Procedure Laterality Date    HYSTERECTOMY      left knee      THYROIDECTOMY      TONSILLECTOMY         Prior to Admission medications    Medication Sig Start Date End Date Taking? Authorizing Provider   acetaminophen-codeine 300-30mg (TYLENOL #3) 300-30 mg Tab Take by mouth.   Yes Historical Provider, MD   amlodipine (NORVASC) 10 MG tablet Take 1 tablet (10 mg total) by mouth once daily. 6/16/17  Yes Juana Lyons MD   butalbital-aspirin-caffeine -40 mg (FIORINAL) -40 mg Cap Take 1 capsule by mouth every 4 (four) hours as needed. 6/16/17  Yes Juana Lyons MD   levothyroxine (SYNTHROID) 125 MCG tablet Take 125 mcg by mouth once daily.   Yes Historical Provider, MD   lisinopril 10 MG tablet Take 40 mg by mouth once daily.    Yes Historical Provider, MD   albuterol 90 mcg/actuation inhaler Inhale 2 puffs into the lungs every 6 (six) hours as needed for  Wheezing. Rescue 6/16/17 6/16/18  Juana Lyons MD   rosuvastatin (CRESTOR) 10 MG tablet Take 10 mg by mouth once daily.    Historical Provider, MD       Social History     Socioeconomic History    Marital status:      Spouse name: Not on file    Number of children: Not on file    Years of education: Not on file    Highest education level: Not on file   Occupational History    Not on file   Social Needs    Financial resource strain: Not on file    Food insecurity:     Worry: Not on file     Inability: Not on file    Transportation needs:     Medical: Not on file     Non-medical: Not on file   Tobacco Use    Smoking status: Current Every Day Smoker     Packs/day: 0.50     Years: 15.00     Pack years: 7.50     Types: Cigarettes    Smokeless tobacco: Never Used   Substance and Sexual Activity    Alcohol use: No    Drug use: No    Sexual activity: Never   Lifestyle    Physical activity:     Days per week: Not on file     Minutes per session: Not on file    Stress: Not on file   Relationships    Social connections:     Talks on phone: Not on file     Gets together: Not on file     Attends [employer] service: Not on file     Active member of club or organization: Not on file     Attends meetings of clubs or organizations: Not on file     Relationship status: Not on file   Other Topics Concern    Not on file   Social History Narrative    Not on file       Family History   Problem Relation Age of Onset    Depression Mother     Hypertension Sister     Blindness Neg Hx     Glaucoma Neg Hx     Macular degeneration Neg Hx     Retinal detachment Neg Hx        Review of patient's allergies indicates:   Allergen Reactions    Benzodiazepines     Lidocaine     Toradol [ketorolac]     Tramadol     Allergies have been reviewed.     ROS: Review of Systems   Constitutional: Negative for chills, fever and weight loss.   HENT: Negative for sore throat.    Eyes: Negative for double vision and  photophobia.   Respiratory: Positive for cough, sputum production and shortness of breath.    Cardiovascular: Positive for leg swelling (mild) and PND (occasional). Negative for palpitations.   Gastrointestinal: Negative for abdominal pain and diarrhea.   Genitourinary: Negative for dysuria and frequency.   Musculoskeletal: Positive for myalgias (generalized). Negative for back pain and neck pain.   Skin: Negative.  Negative for rash.   Neurological: Negative for dizziness, seizures, weakness and headaches.   Endo/Heme/Allergies: Does not bruise/bleed easily.   Psychiatric/Behavioral: Negative for memory loss. The patient has insomnia (intermittant ).        PE:   Vitals:    06/15/19 0832 06/15/19 0900 06/15/19 0913 06/15/19 1000   BP:  97/64 97/64 110/70   Pulse: 100 88 90 88   Resp: (!) 21 (!) 24 (!) 26 (!) 27   Temp:       TempSrc:       SpO2: 95% 95% 96% 95%   Weight:       Height:        Physical Exam    Alert and orientated X 3   HEENT: Head: Normocephalic no trauma                Ears : Normal Pinna No Drainage no Battles sign                Eyes: Vision Unchanged, No conjunctivitis,No drainage                Neck: Supple, No JVD,No Abnormal Carotid Pulsations                Throat: No Erythema, No pus,No Swelling,Mallampati score= 2    Chest: Course bs bilaterally with poor air entry with wheezing and rhonchi   Cardiac: RRR S1+ S2 with a -S3: +M = 2/6, No R/H/G  Abdomen: Bowel Sounds are Normal.Soft Abdomen. No organomegaly of Liver,Spleen,or Kidneys   CNS: Non focal and intact. Cranial nerves 2, 346,8,9,10 and 12 are normal.Norrmal gait.Normal posture.  Extremities: No Clubbing,No Cyanosis with oxygen,Positive mild edema of lower extremities Bilateral  Skin: No Rash, No Ulcerative sores,and No cellulitis of the IV site.    Lab Results   Component Value Date    WBC 2.21 (L) 06/15/2019    HGB 12.4 06/15/2019    HCT 40.3 06/15/2019     06/15/2019    ALT 19 06/15/2019    AST 22 06/15/2019      06/15/2019    K 5.0 06/15/2019    CL 89 (L) 06/15/2019    CREATININE 0.7 06/15/2019    BUN 11 06/15/2019    CO2 38 (H) 06/15/2019    TSH 8.561 (H) 06/14/2019    INR 0.9 06/14/2019    HGBA1C 6.6 (H) 06/15/2017               Assessment/Plan:     * COPD exacerbation  Severe 92 CO2   po2 66 on 2 liters which means her po2 on Ra would be below 55    Acute respiratory failure with hypoxia and hypercarbia  Wears o2  Had inhaled bug spray from a bug bomb to kill roaches     Tobacco abuse  Still smoking        Wear bipap influenza .  Will start mono therapy antibiotics in view of cough will need antibiotics as they reduce treatment failures by 66% and in hospital mortality ny 78%)  Thank you for your consult. I will follow-up with patient. Please contact us if you have any additional questions.     Javier Vance MD  Pulmonology  [facility]

## 2019-06-15 NOTE — ASSESSMENT & PLAN NOTE
Long time smoker with several admissions for COPD exacerbations  Has had pCO2 in 90s in the past  On past admissions BiPaP noncompliant as well  Had a long discussion with patient about importance of BiPaP. Needs it to improve and to prevent worsening. Could result in intubation if worsening. Will try to wear again today  Her mental status is improved--I suspect despite high Co2 she is not far from her baseline  She self increased her home O2 to 6L prior to arrival which also likely contributed to her retaining. Down to 2L NC and maintains sats > 92%. OK to keep around 88-92% right now  duonebs  Steroids  No antibx for now

## 2019-06-15 NOTE — ASSESSMENT & PLAN NOTE
Chronic pain issues  She is not prescribed opioids as outpatient per last few notes in chart.  UDS + marian (prescribed fioricet)  Careful use of narcotics due to mental status/respiratory status\  Zanaflex PRN started but watch for sedation

## 2019-06-15 NOTE — ASSESSMENT & PLAN NOTE
Long time smoker with several admissions for COPD exacerbations  Has had pCOS in 90s in the past  On past admissions BiPaP noncompliant as well  Had a long discussion with patient about importance of BiPaP. Needs it to improve and to prevent worsening. Could result in intubation if worsening. Will try to wear again  She is lethargic but appropriate and oriented  She self increased her home O2 to 6L prior to arrival which also likely contributing to her retaining. Down to 2L NC and maintains sats > 92%. OK to keep around 88-92% right now  duonebs  Steroids  No antibx for now

## 2019-06-16 NOTE — PROGRESS NOTES
"[facility] Hospital Medicine  Progress Note    Patient Name: Di Deluca  MRN: [id number]  Patient Class: IP- Inpatient   Admission Date: 6/14/2019  Length of Stay: 2 days  Attending Physician: Claudia Higginbotham MD  Primary Care Provider: Thomas Huertas MD        Subjective:     Principal Problem:COPD exacerbation      HPI:  Patient presented to ER with SOB. Sx started 2 days ago. She is visiting relatives in our area but she lives out of town. Long history of tobacco use and COPD. She has had multiple admissions for COPD exacerbations. She is still smoking: reports 1/2 PPD for 14 years. She began feeling more SOB, worsening cough and wheezing over last 48 hours. She has home O2 at 2L NC, self increased to 6L before arrival to ER. In ER she was lethargic. ABG showed pCO2 92. Chart review shows this has been a recurrent problem for the patient. She is not on any maintenance inhalers, just PRN albuterol which she uses 4x daily. Admitted to ICU for continuous BiPaP, nebs, steroids. CXR without consolidation; no fevers or leukocytosis. She is lethargic, sometimes drifts off in middle of her sentences but overall appropriate and answering questions.     She is refusing to wear BiPaP this evening. Wanting pain medications.     Overview/Hospital Course:  Patient is more alert this AM. She did not wear BiPaP "i'm smothering". ABG 7.37/83/66--only mild improvement. Remains on steroids and nebs. She reports feeling better. She c/o muscle spasms of back, wants Zanaflex because it "is the only thing that works". She also asking for lactulose. No BM 6 days. Denies abd pain, n/v. Eating ok. UDS + marian (prescribed fioricet for pain, headaches).     6/16: worse BiPaP yesterday but not overnight. States she is feeling better but "I need one more days" Tells me she must be discharged tomorrow to pay rent for her PO Box so her mail doesn't get sent back. Given lactulose yesterday for 6 days constipation; had BM. " "Denies abd pain, n/v. She is using fioricet and Zanaflex PRN for pain without sedation.     Interval History: feeling better, refuses BiPaP    Review of Systems   Constitutional: Negative for activity change, fatigue, fever and unexpected weight change.   HENT: Negative for congestion, ear pain, hearing loss, rhinorrhea and sore throat.    Eyes: Negative for pain, redness and visual disturbance.   Respiratory: Positive for cough (somtimes productive). Negative for shortness of breath and wheezing.    Cardiovascular: Negative for chest pain, palpitations and leg swelling.   Gastrointestinal: Positive for constipation (better but still feels "Like something in there"). Negative for abdominal pain, blood in stool, diarrhea, nausea and vomiting.   Genitourinary: Negative for dysuria, frequency, pelvic pain and urgency.   Musculoskeletal: Positive for back pain. Negative for joint swelling and neck pain.   Skin: Negative for color change, rash and wound.   Neurological: Positive for headaches. Negative for dizziness, tremors, weakness and light-headedness.     Objective:     Vital Signs (Most Recent):  Temp: 96.8 °F (36 °C) (06/16/19 0715)  Pulse: 83 (06/16/19 0900)  Resp: (!) 25 (06/16/19 0900)  BP: (!) 114/57 (06/16/19 0900)  SpO2: (!) 94 % (06/16/19 0900) Vital Signs (24h Range):  Temp:  [96.7 °F (35.9 °C)-97.7 °F (36.5 °C)] 96.8 °F (36 °C)  Pulse:  [] 83  Resp:  [14-33] 25  SpO2:  [91 %-100 %] 94 %  BP: ()/(55-78) 114/57     Weight: 48.5 kg (107 lb)  Body mass index is 18.37 kg/m².    Intake/Output Summary (Last 24 hours) at 6/16/2019 0939  Last data filed at 6/15/2019 2200  Gross per 24 hour   Intake 790 ml   Output --   Net 790 ml      Physical Exam   Constitutional: She is oriented to person, place, and time. She appears well-developed and well-nourished. No distress.   HENT:   Head: Normocephalic and atraumatic.   Right Ear: External ear normal.   Left Ear: External ear normal.   Eyes: Pupils are " "equal, round, and reactive to light. Conjunctivae and EOM are normal. Right eye exhibits no discharge. Left eye exhibits no discharge.   Neck: Neck supple. No tracheal deviation present.   Cardiovascular: Normal rate and regular rhythm.   No murmur heard.  Pulmonary/Chest: Effort normal. No respiratory distress. She has wheezes. She has no rales.   Less wheezing but still mild present expiratory   poor airflow b/l but improved over last 24 hours   Abdominal: Soft. Bowel sounds are normal. She exhibits no distension. There is no tenderness.   Neurological: She is alert and oriented to person, place, and time. No cranial nerve deficit.   Skin: Skin is warm and dry.   Psychiatric: She has a normal mood and affect. Her behavior is normal.   Nursing note and vitals reviewed.      Significant Labs:   CBC:   Recent Labs   Lab 06/14/19  1736 06/15/19  0357 06/16/19  0351   WBC 4.08 2.21* 5.11   HGB 12.2 12.4 12.2   HCT 39.7 40.3 40.2    248 259     CMP:   Recent Labs   Lab 06/14/19  1736 06/15/19  0357 06/16/19  0351   * 137 138   K 3.6 5.0 4.7   CL 90* 89* 93*   CO2 36* 38* 35*    151* 137*   BUN 12 11 13   CREATININE 0.7 0.7 0.7   CALCIUM 10.6* 9.7 9.3   PROT 7.5 6.7 6.5   ALBUMIN 4.0 3.5 3.5   BILITOT 0.1 0.1 0.1   ALKPHOS 96 83 79   AST 27 22 22   ALT 22 19 19   ANIONGAP 9 10 10   EGFRNONAA >60 >60 >60     TSH:   Recent Labs   Lab 06/14/19 1736   TSH 8.561*     Urine Studies:   Recent Labs   Lab 06/15/19  0036   COLORU Yellow   APPEARANCEUA Clear   PHUR 6.0   SPECGRAV 1.015   PROTEINUA Negative   GLUCUA Negative   KETONESU Negative   BILIRUBINUA Negative   OCCULTUA Negative   NITRITE Negative   UROBILINOGEN Negative   LEUKOCYTESUR Negative       Significant Imaging: I have reviewed all pertinent imaging results/findings within the past 24 hours.     CXR 6/14/19: "Stable chest x-ray with no acute finding"      Assessment/Plan:      * COPD exacerbation  Long time smoker with several admissions for " COPD exacerbations  Has had pCO2 in 90s in the past  On past admissions BiPaP noncompliant as well  Had a long discussion with patient about importance of BiPaP. Needs it to improve and to prevent worsening. Could result in intubation if worsening. Will try to wear again today  Her mental status is improved--I suspect despite high Co2 she is not far from her baseline  She self increased her home O2 to 6L prior to arrival which also likely contributed to her retaining. Down to 2L NC and maintains sats > 92%. OK to keep around 88-92% right now  duonebs  Steroids      6/16: improving. Refuses BipaP overnight but worse yesterday. States will wear again today. ABG pending. On 2L NC, this is what she wears at home. Will have her amblate a bit more today as well  Dr. Vance added levaquin, will continue  duonebs  Steroids  She insists on being discharged tomorrow AM to pay rent on her PO Box--discussed will see how she is doing to be sure she is safe for discharge      Acute respiratory failure with hypoxia and hypercarbia  Started on BiPaP on admission  She is wearing intermittently  Improving  ABG this AM pending  Due to COPD/continued tobacco use      Postoperative hypothyroidism  Cont synthroid  TSH, may need adjustment as outpatient      Tobacco abuse  cessatoin counseling completed on admission      Essential hypertension  Cont home amlodipine      Back pain  Chronic pain issues  She is not prescribed opioids as outpatient per last few notes in chart.  UDS + marian (prescribed fioricet)  Careful use of narcotics due to mental status/respiratory status  Zanaflex PRN started but watch for sedation, seems to be tolerating well      History of thyroid cancer  S/p surgery and now on synthroid for hypothyroidism        VTE Risk Mitigation (From admission, onward)        Ordered     IP VTE LOW RISK PATIENT  Once      06/14/19 1857     Place sequential compression device  Until discontinued      06/14/19 1857          Critical  care time spent on the evaluation and treatment of severe organ dysfunction, review of pertinent labs and imaging studies, discussions with consulting providers and discussions with patient/family: 38 minutes.      Claudia Higginbotham MD  Department of Hospital Medicine   [facility]

## 2019-06-16 NOTE — EICU
eICU Note : New Admit :notified by the [facility]:    Brief HPI:    Vital Signs :        06/15/19 1700 06/15/19 1800   BP: 115/62 (!) 93/57   Pulse: 87 83   Resp: (!) 23 (!) 23   SpO2: 96% 96%           Camera Assessment :Pt lying in bed on mechanical ventilator, on tidal volume 450, respiratory rate 20, FiO2 50%, PEEP of 5, peak pressures of 32, minute ventilation of 9.47, I:E ratio 1: 2.7      Data:ABG : 7.3 7/83/66/13/92 percent  Sodium 137, potassium 5.0, chloride 89, CO2 38, anion gap 10  TSH 8.561  WBC 2.21, hemoglobin 12.4, hematocrit 40.3, platelets 248,  Drug screen positive for barbiturates  CXR: Stable CXR   Impression and recommendations:1.Acute respiratory failure due to COPD exacerbation.  On mechanical ventilatoron tidal volume 450, respiratory rate 20, FiO2 50%, PEEP of 5, peak pressures of 32, minute ventilation of 9.47, I:E ratio 1: 2.7, on duo nebs and seroids , no antibiotics   2.Hypertension :ON amlodipine   3. Chronic Back pain :   4.history of thyroid cancer status post surgery on Synthroid for hypothyroidism.  5. PUD, DVT prophylaxis SCD and PPI.            Starr Stoner M.D  eICU Physician

## 2019-06-16 NOTE — PROGRESS NOTES
Staff Handoff    Patient to room 306 via W/C per PCT.  Phone report received from BAYRON Hunt. VS stable. Afebrile. No distress noted. Assessment complete per flowsheet. Call bell in reach. Instructed to call for any needs. Will continue to monitor for any change in status.  Resident Handoff

## 2019-06-16 NOTE — PROGRESS NOTES
Staff Handoff  Bedside report received from BAYRON Hernandez. Patient resting quietly in bed, watching TV. Sinus rhythm noted on cardiac monitor, respirations somewhat labored with use of accessory muscles noted, resp rate 21/min. O2 sat 96%. O2 in use  at 2 lpm per n/c. Agrees to use BiPAP after breakfast. Assessment per flow sheet.      Resident Handoff

## 2019-06-16 NOTE — SUBJECTIVE & OBJECTIVE
"Interval History: feeling better, refuses BiPaP    Review of Systems   Constitutional: Negative for activity change, fatigue, fever and unexpected weight change.   HENT: Negative for congestion, ear pain, hearing loss, rhinorrhea and sore throat.    Eyes: Negative for pain, redness and visual disturbance.   Respiratory: Positive for cough (somtimes productive). Negative for shortness of breath and wheezing.    Cardiovascular: Negative for chest pain, palpitations and leg swelling.   Gastrointestinal: Positive for constipation (better but still feels "Like something in there"). Negative for abdominal pain, blood in stool, diarrhea, nausea and vomiting.   Genitourinary: Negative for dysuria, frequency, pelvic pain and urgency.   Musculoskeletal: Positive for back pain. Negative for joint swelling and neck pain.   Skin: Negative for color change, rash and wound.   Neurological: Positive for headaches. Negative for dizziness, tremors, weakness and light-headedness.     Objective:     Vital Signs (Most Recent):  Temp: 96.8 °F (36 °C) (06/16/19 0715)  Pulse: 83 (06/16/19 0900)  Resp: (!) 25 (06/16/19 0900)  BP: (!) 114/57 (06/16/19 0900)  SpO2: (!) 94 % (06/16/19 0900) Vital Signs (24h Range):  Temp:  [96.7 °F (35.9 °C)-97.7 °F (36.5 °C)] 96.8 °F (36 °C)  Pulse:  [] 83  Resp:  [14-33] 25  SpO2:  [91 %-100 %] 94 %  BP: ()/(55-78) 114/57     Weight: 48.5 kg (107 lb)  Body mass index is 18.37 kg/m².    Intake/Output Summary (Last 24 hours) at 6/16/2019 0939  Last data filed at 6/15/2019 2200  Gross per 24 hour   Intake 790 ml   Output --   Net 790 ml      Physical Exam   Constitutional: She is oriented to person, place, and time. She appears well-developed and well-nourished. No distress.   HENT:   Head: Normocephalic and atraumatic.   Right Ear: External ear normal.   Left Ear: External ear normal.   Eyes: Pupils are equal, round, and reactive to light. Conjunctivae and EOM are normal. Right eye exhibits no " "discharge. Left eye exhibits no discharge.   Neck: Neck supple. No tracheal deviation present.   Cardiovascular: Normal rate and regular rhythm.   No murmur heard.  Pulmonary/Chest: Effort normal. No respiratory distress. She has wheezes. She has no rales.   Less wheezing but still mild present expiratory   poor airflow b/l but improved over last 24 hours   Abdominal: Soft. Bowel sounds are normal. She exhibits no distension. There is no tenderness.   Neurological: She is alert and oriented to person, place, and time. No cranial nerve deficit.   Skin: Skin is warm and dry.   Psychiatric: She has a normal mood and affect. Her behavior is normal.   Nursing note and vitals reviewed.      Significant Labs:   CBC:   Recent Labs   Lab 06/14/19  1736 06/15/19  0357 06/16/19  0351   WBC 4.08 2.21* 5.11   HGB 12.2 12.4 12.2   HCT 39.7 40.3 40.2    248 259     CMP:   Recent Labs   Lab 06/14/19  1736 06/15/19  0357 06/16/19  0351   * 137 138   K 3.6 5.0 4.7   CL 90* 89* 93*   CO2 36* 38* 35*    151* 137*   BUN 12 11 13   CREATININE 0.7 0.7 0.7   CALCIUM 10.6* 9.7 9.3   PROT 7.5 6.7 6.5   ALBUMIN 4.0 3.5 3.5   BILITOT 0.1 0.1 0.1   ALKPHOS 96 83 79   AST 27 22 22   ALT 22 19 19   ANIONGAP 9 10 10   EGFRNONAA >60 >60 >60     TSH:   Recent Labs   Lab 06/14/19 1736   TSH 8.561*     Urine Studies:   Recent Labs   Lab 06/15/19  0036   COLORU Yellow   APPEARANCEUA Clear   PHUR 6.0   SPECGRAV 1.015   PROTEINUA Negative   GLUCUA Negative   KETONESU Negative   BILIRUBINUA Negative   OCCULTUA Negative   NITRITE Negative   UROBILINOGEN Negative   LEUKOCYTESUR Negative       Significant Imaging: I have reviewed all pertinent imaging results/findings within the past 24 hours.     CXR 6/14/19: "Stable chest x-ray with no acute finding"  "

## 2019-06-16 NOTE — CARE UPDATE
"Patient spoken to at length of the importance of wearing Bipap and its benefits. She is currently refusing saying she feels better and her "numbers" (pointing at sats on monitor) are fine. She states the bipap feels as if she is smothering. Will try again to get patient to wear Bipap at some point through out the night. Will continue to monitor.  "

## 2019-06-16 NOTE — ASSESSMENT & PLAN NOTE
Long time smoker with several admissions for COPD exacerbations  Has had pCO2 in 90s in the past  On past admissions BiPaP noncompliant as well  Had a long discussion with patient about importance of BiPaP. Needs it to improve and to prevent worsening. Could result in intubation if worsening. Will try to wear again today  Her mental status is improved--I suspect despite high Co2 she is not far from her baseline  She self increased her home O2 to 6L prior to arrival which also likely contributed to her retaining. Down to 2L NC and maintains sats > 92%. OK to keep around 88-92% right now  duonebs  Steroids      6/16: improving. Refuses BipaP overnight but worse yesterday. States will wear again today. ABG pending. On 2L NC, this is what she wears at home. Will have her amblate a bit more today as well  Dr. Vance added levaquin, will continue  duonebs  Steroids  She insists on being discharged tomorrow AM to pay rent on her PO Box--discussed will see how she is doing to be sure she is safe for discharge

## 2019-06-16 NOTE — EICU
Video assessment done, patient awake and pleasant. Seems to be SOB while conversing.  Nasal cannula in place, educated on taking long deep breaths and trying not to exert herself.  Will follow up

## 2019-06-16 NOTE — NURSING TRANSFER
Nursing Transfer Note      6/16/2019     Transfer To: 306    Transfer via wheelchair    Transfer with n/c to O2 @ 2 lpm, cardiac monitoring    Transported by TOMI Gallo    Medicines sent: N/A    Chart send with patient: Yes    Notified: friend per patient    Patient reassessed at: 6/16/19, 16:00    Upon arrival to floor: patient oriented to room, call bell in reach and bed in lowest position

## 2019-06-16 NOTE — ASSESSMENT & PLAN NOTE
Chronic pain issues  She is not prescribed opioids as outpatient per last few notes in chart.  UDS + marian (prescribed fioricet)  Careful use of narcotics due to mental status/respiratory status  Zanaflex PRN started but watch for sedation, seems to be tolerating well

## 2019-06-16 NOTE — ASSESSMENT & PLAN NOTE
Started on BiPaP on admission  She is wearing intermittently  Improving  ABG this AM pending  Due to COPD/continued tobacco use

## 2019-06-17 NOTE — ASSESSMENT & PLAN NOTE
Chronic pain issues  She is not prescribed opioids as outpatient per last few notes in chart.  UDS + marian (prescribed fioricet)  Careful use of narcotics due to mental status/respiratory status  Zanaflex PRN started but watch for sedation, seems to be tolerating well  Has outpt rx

## 2019-06-17 NOTE — PROGRESS NOTES
"[facility] Medicine  Progress Note    Patient Name: Di Deluca  MRN: [id number]  Patient Class: IP- Inpatient   Admission Date: 6/14/2019  Length of Stay: 3 days  Attending Physician: Claudia Higginbotham MD  Primary Care Provider: Thomas Huertas MD        Subjective:     Principal Problem:COPD exacerbation      HPI:  Patient presented to ER with SOB. Sx started 2 days ago. She is visiting relatives in our area but she lives out of town. Long history of tobacco use and COPD. She has had multiple admissions for COPD exacerbations. She is still smoking: reports 1/2 PPD for 14 years. She began feeling more SOB, worsening cough and wheezing over last 48 hours. She has home O2 at 2L NC, self increased to 6L before arrival to ER. In ER she was lethargic. ABG showed pCO2 92. Chart review shows this has been a recurrent problem for the patient. She is not on any maintenance inhalers, just PRN albuterol which she uses 4x daily. Admitted to ICU for continuous BiPaP, nebs, steroids. CXR without consolidation; no fevers or leukocytosis. She is lethargic, sometimes drifts off in middle of her sentences but overall appropriate and answering questions.     She is refusing to wear BiPaP this evening. Wanting pain medications.     Overview/Hospital Course:  Patient is more alert this AM. She did not wear BiPaP "i'm smothering". ABG 7.37/83/66--only mild improvement. Remains on steroids and nebs. She reports feeling better. She c/o muscle spasms of back, wants Zanaflex because it "is the only thing that works". She also asking for lactulose. No BM 6 days. Denies abd pain, n/v. Eating ok. UDS + marian (prescribed fioricet for pain, headaches).     6/16: worse BiPaP yesterday but not overnight. States she is feeling better but "I need one more days" Tells me she must be discharged tomorrow to pay rent for her PO Box so her mail doesn't get sent back. Given lactulose yesterday for 6 days constipation; had BM. " Denies abd pain, n/v. She is using fioricet and Zanaflex PRN for pain without sedation.     6/17 She remains on O2 which she does have at home. Sats 96% on 3L NC. Unable to tolerate bipap but pCO2 trended down to her baseline. Afebrile. No elevated WBC. She is still insisting on dishcharge today to take care of personal business.     Interval History: feeling better, refuses BiPaP    Review of Systems   Constitutional: Negative for activity change, fatigue, fever and unexpected weight change.   HENT: Negative for congestion, ear pain, hearing loss, rhinorrhea and sore throat.    Eyes: Negative for pain, redness and visual disturbance.   Respiratory: Positive for cough (somtimes productive; improved). Negative for shortness of breath and wheezing.    Cardiovascular: Negative for chest pain, palpitations and leg swelling.   Gastrointestinal: Negative for abdominal pain, blood in stool, constipation, diarrhea, nausea and vomiting.   Genitourinary: Negative for dysuria, frequency, pelvic pain and urgency.   Musculoskeletal: Positive for back pain (chronic). Negative for joint swelling and neck pain.   Skin: Negative for color change, rash and wound.   Neurological: Positive for headaches (chronic). Negative for dizziness, tremors, weakness and light-headedness.     Objective:     Vital Signs (Most Recent):  Temp: 96.6 °F (35.9 °C) (06/17/19 0416)  Pulse: 101 (06/17/19 0800)  Resp: (!) 22 (06/17/19 0743)  BP: 137/65 (06/17/19 0743)  SpO2: 96 % (06/17/19 0743) Vital Signs (24h Range):  Temp:  [96.6 °F (35.9 °C)-99 °F (37.2 °C)] 96.6 °F (35.9 °C)  Pulse:  [] 101  Resp:  [15-27] 22  SpO2:  [94 %-99 %] 96 %  BP: ()/(54-73) 137/65     Weight: 48.5 kg (107 lb)  Body mass index is 18.37 kg/m².    Intake/Output Summary (Last 24 hours) at 6/17/2019 0843  Last data filed at 6/17/2019 0600  Gross per 24 hour   Intake 1120 ml   Output --   Net 1120 ml      Physical Exam   Constitutional: She is oriented to person, place,  "and time. She appears well-developed and well-nourished. No distress.   HENT:   Head: Normocephalic and atraumatic.   Right Ear: External ear normal.   Left Ear: External ear normal.   Eyes: Pupils are equal, round, and reactive to light. Conjunctivae and EOM are normal. Right eye exhibits no discharge. Left eye exhibits no discharge.   Neck: Neck supple. No tracheal deviation present.   Cardiovascular: Normal rate and regular rhythm.   No murmur heard.  Pulmonary/Chest: Effort normal. No respiratory distress. She has no wheezes. She has no rales.   Improved airflow b/l   Abdominal: Soft. Bowel sounds are normal. She exhibits no distension. There is no tenderness.   Neurological: She is alert and oriented to person, place, and time. No cranial nerve deficit.   Skin: Skin is warm and dry.   Psychiatric: She has a normal mood and affect. Her behavior is normal.   Nursing note and vitals reviewed.      Significant Labs:   CBC:   Recent Labs   Lab 06/16/19  0351 06/17/19  0609   WBC 5.11 4.70   HGB 12.2 11.2*   HCT 40.2 37.1    255     CMP:   Recent Labs   Lab 06/16/19  0351 06/17/19  0609    137   K 4.7 4.9   CL 93* 93*   CO2 35* 35*   * 132*   BUN 13 11   CREATININE 0.7 0.6   CALCIUM 9.3 9.5   PROT 6.5 6.1   ALBUMIN 3.5 3.2*   BILITOT 0.1 0.1   ALKPHOS 79 67   AST 22 17   ALT 19 17   ANIONGAP 10 9   EGFRNONAA >60 >60     TSH:   Recent Labs   Lab 06/14/19  1736   TSH 8.561*   free t4 0.78    Phos 3.7  Mag 2.0    Lab Results   Component Value Date    INR 0.9 06/14/2019    INR 1.0 06/14/2017    INR 1.1 05/18/2014     BNP  Recent Labs   Lab 06/14/19  1736   BNP 79     Recent Labs   Lab 06/14/19  1736     122   CPKMB 3.5   TROPONINI <0.006   MB 2.9         Lactic acid 0.6    Urinalysis: negative  UDS + opiate     Blood cultures NGTD x 2    Significant Imaging:    CXR 6/14/19: "Stable chest x-ray with no acute finding"    EKG Normal sinus rhythm  Possible Left atrial enlargement  Rightward " axis  Pulmonary disease pattern  ST and T wave abnormality, consider anterior ischemia  Abnormal ECG  When compared with ECG of 14-JUN-2017 15:29,  T wave inversion more evident in Anterior leads      Assessment/Plan:      * COPD exacerbation  Long time smoker with several admissions for COPD exacerbations  Has had pCO2 in 90s in the past  On past admissions BiPaP noncompliant as well  Had a long discussion with patient about importance of BiPaP. Needs it to improve and to prevent worsening. Could result in intubation if worsening. Will try to wear again today  Her mental status is improved--I suspect despite high Co2 she is not far from her baseline  She self increased her home O2 to 6L prior to arrival which also likely contributed to her retaining. Down to 2L NC and maintains sats > 92%. OK to keep around 88-92% right now  duonebs  Steroids      6/16: improving. Refuses BipaP overnight but worse yesterday. States will wear again today. ABG pending. On 2L NC, this is what she wears at home. Will have her amblate a bit more today as well  Dr. Vance added levaquin, will continue  duonebs  Steroids  She insists on being discharged tomorrow AM to pay rent on her PO Box--discussed will see how she is doing to be sure she is safe for discharge    6/17 she is still begging for d/c today. She is not 100% but does report feeling much better. No elevated WBC. Sats 96% 3L NC. Has home O2. Also has nebulizer at home and has meds.   She has improved significantly so I think she will be safe to complete PO levaquin and prednisone as outpatient. I fear she will leave AMA if we don't discharge today.     Acute respiratory failure with hypoxia and hypercarbia  Started on BiPaP on admission  She is wearing intermittently  Improving  ABG this AM pending  Due to COPD/continued tobacco use    Improving, d/c on home O2, discussed not increasing at home O2 due to CO2 retention  Home on levaquin and prednisone      Postoperative  hypothyroidism  Cont synthroid  TSH, may need adjustment as outpatient      Tobacco abuse  cessatoin counseling completed on admission      Essential hypertension  Cont home amlodipine  bp stable    Back pain  Chronic pain issues  She is not prescribed opioids as outpatient per last few notes in chart.  UDS + marian (prescribed fioricet)  Careful use of narcotics due to mental status/respiratory status  Zanaflex PRN started but watch for sedation, seems to be tolerating well  Has outpt rx     History of thyroid cancer  S/p surgery and now on synthroid for hypothyroidism  Lab Results   Component Value Date    TSH 8.561 (H) 06/14/2019   free t4 normal        VTE Risk Mitigation (From admission, onward)        Ordered     IP VTE LOW RISK PATIENT  Once      06/14/19 1857     Place sequential compression device  Until discontinued      06/14/19 1857                Claudia Higginbotham MD  Department of Hospital Medicine   [facility]

## 2019-06-17 NOTE — PROGRESS NOTES
Nursing Notes  Bedside handoff completed with BAYRON Couch. Patient sitting on bed. Patient is expressing her need to be discharged today and handle some personal business. No distress noted. Call bell within reach of patient.       Huddle Comments

## 2019-06-17 NOTE — NURSING
Patient discharged to home. Placed on portable oxygen tank to transport down by  . No distress noted.

## 2019-06-17 NOTE — ASSESSMENT & PLAN NOTE
S/p surgery and now on synthroid for hypothyroidism  Lab Results   Component Value Date    TSH 8.561 (H) 06/14/2019   free t4 normal

## 2019-06-17 NOTE — DISCHARGE SUMMARY
"[facility] Medicine  Discharge Summary      Patient Name: Di Deluca  MRN: [id number]  Admission Date: 6/14/2019  Hospital Length of Stay: 3 days  Discharge Date and Time:  06/17/2019 10:35 AM  Attending Physician: Claudia Higginbotham MD   Discharging Provider: Vivienne Patrick NP  Primary Care Provider: Thomas Huertas MD      HPI:   Patient presented to ER with SOB. Sx started 2 days ago. She is visiting relatives in our area but she lives out of town. Long history of tobacco use and COPD. She has had multiple admissions for COPD exacerbations. She is still smoking: reports 1/2 PPD for 14 years. She began feeling more SOB, worsening cough and wheezing over last 48 hours. She has home O2 at 2L NC, self increased to 6L before arrival to ER. In ER she was lethargic. ABG showed pCO2 92. Chart review shows this has been a recurrent problem for the patient. She is not on any maintenance inhalers, just PRN albuterol which she uses 4x daily. Admitted to ICU for continuous BiPaP, nebs, steroids. CXR without consolidation; no fevers or leukocytosis. She is lethargic, sometimes drifts off in middle of her sentences but overall appropriate and answering questions.     She is refusing to wear BiPaP this evening. Wanting pain medications.     * No surgery found *      Hospital Course:   Patient is more alert this AM. She did not wear BiPaP "i'm smothering". ABG 7.37/83/66--only mild improvement. Remains on steroids and nebs. She reports feeling better. She c/o muscle spasms of back, wants Zanaflex because it "is the only thing that works". She also asking for lactulose. No BM 6 days. Denies abd pain, n/v. Eating ok. UDS + marian (prescribed fioricet for pain, headaches).     6/16: worse BiPaP yesterday but not overnight. States she is feeling better but "I need one more days" Tells me she must be discharged tomorrow to pay rent for her PO Box so her mail doesn't get sent back. Given lactulose yesterday for " 6 days constipation; had BM. Denies abd pain, n/v. She is using fioricet and Zanaflex PRN for pain without sedation.     6/17 She remains on O2 which she does have at home. Sats 96% on 3L NC. Unable to tolerate bipap but pCO2 trended down to her baseline. Afebrile. No elevated WBC. She is still insisting on dishcharge today to take care of personal business.      Consults:   Consults (From admission, onward)        Status Ordering Provider     Inpatient consult to Pulmonology  Once     Provider:  Javier Vance MD    Acknowledged TEOFILO CONNER          * COPD exacerbation  Long time smoker with several admissions for COPD exacerbations  Has had pCO2 in 90s in the past  On past admissions BiPaP noncompliant as well  Had a long discussion with patient about importance of BiPaP. Needs it to improve and to prevent worsening. Could result in intubation if worsening. Will try to wear again today  Her mental status is improved--I suspect despite high Co2 she is not far from her baseline  She self increased her home O2 to 6L prior to arrival which also likely contributed to her retaining. Down to 2L NC and maintains sats > 92%. OK to keep around 88-92% right now  duonebs  Steroids      6/16: improving. Refuses BipaP overnight but worse yesterday. States will wear again today. ABG pending. On 2L NC, this is what she wears at home. Will have her amblate a bit more today as well  Dr. Vance added levaquin, will continue  duonebs  Steroids  She insists on being discharged tomorrow AM to pay rent on her PO Box--discussed will see how she is doing to be sure she is safe for discharge    6/17 she is still begging for d/c today. She is not 100% but does report feeling much better. No elevated WBC. Sats 96% 3L NC. Has home O2. Also has nebulizer at home and has meds.   She has improved significantly so I think she will be safe to complete PO levaquin and prednisone as outpatient. I fear she will leave AMA if we don't discharge today.      Acute respiratory failure with hypoxia and hypercarbia  Started on BiPaP on admission  She is wearing intermittently  Improving  ABG this AM pending  Due to COPD/continued tobacco use    Improving, d/c on home O2, discussed not increasing at home O2 due to CO2 retention  Home on levaquin and prednisone      Postoperative hypothyroidism  Cont synthroid  TSH, may need adjustment as outpatient      Tobacco abuse  cessatoin counseling completed on admission      Essential hypertension  Cont home amlodipine  bp stable    Back pain  Chronic pain issues  She is not prescribed opioids as outpatient per last few notes in chart.  UDS + marian (prescribed fioricet)  Careful use of narcotics due to mental status/respiratory status  Zanaflex PRN started but watch for sedation, seems to be tolerating well  Has outpt rx     History of thyroid cancer  S/p surgery and now on synthroid for hypothyroidism  Lab Results   Component Value Date    TSH 8.561 (H) 06/14/2019   free t4 normal        Final Active Diagnoses:    Diagnosis Date Noted POA    PRINCIPAL PROBLEM:  COPD exacerbation [J44.1] 06/14/2019 Yes    Acute respiratory failure with hypoxia and hypercarbia [J96.01, J96.02] 06/15/2019 Yes    Postoperative hypothyroidism [E89.0] 06/14/2019 Yes    Essential hypertension [I10] 06/14/2017 Yes    Tobacco abuse [Z72.0] 06/14/2017 Yes    History of thyroid cancer [Z85.850] 07/28/2014 Not Applicable    Back pain [M54.9] 07/28/2014 Yes      Problems Resolved During this Admission:       Discharged Condition: good    Disposition: Home or Self Care    Follow Up:  Follow-up Information     Thomas Huertas MD In 1 week.    Specialty:  Endocrinology  Contact information:  [address] [place] [address]  [place] [address] [phone]                 Patient Instructions:   No discharge procedures on file.    Significant Diagnostic Studies:     CBC:        Recent Labs   Lab 06/16/19  0351 06/17/19  0609   WBC 5.11 4.70   HGB 12.2  "11.2*   HCT 40.2 37.1    255      CMP:        Recent Labs   Lab 06/16/19  0351 06/17/19  0609    137   K 4.7 4.9   CL 93* 93*   CO2 35* 35*   * 132*   BUN 13 11   CREATININE 0.7 0.6   CALCIUM 9.3 9.5   PROT 6.5 6.1   ALBUMIN 3.5 3.2*   BILITOT 0.1 0.1   ALKPHOS 79 67   AST 22 17   ALT 19 17   ANIONGAP 10 9   EGFRNONAA >60 >60      TSH:       Recent Labs   Lab 06/14/19  1736   TSH 8.561*   free t4 0.78     Phos 3.7  Mag 2.0           Lab Results   Component Value Date     INR 0.9 06/14/2019     INR 1.0 06/14/2017     INR 1.1 05/18/2014      BNP      Recent Labs   Lab 06/14/19  1736   BNP 79          Recent Labs   Lab 06/14/19  1736     122   CPKMB 3.5   TROPONINI <0.006   MB 2.9            Lactic acid 0.6     Urinalysis: negative  UDS + opiate      Blood cultures NGTD x 2     Significant Imaging:     CXR 6/14/19: "Stable chest x-ray with no acute finding"     EKG Normal sinus rhythm  Possible Left atrial enlargement  Rightward axis  Pulmonary disease pattern  ST and T wave abnormality, consider anterior ischemia  Abnormal ECG  When compared with ECG of 14-JUN-2017 15:29,  T wave inversion more evident in Anterior leads         Pending Diagnostic Studies:     None         Medications:  Reconciled Home Medications:      Medication List      START taking these medications    lactulose 20 gram/30 mL Soln  Commonly known as:  CHRONULAC  Take 45 mLs (30 g total) by mouth daily as needed.     levoFLOXacin 500 MG tablet  Commonly known as:  LEVAQUIN  Take 1 tablet (500 mg total) by mouth once daily.     predniSONE 20 MG tablet  Commonly known as:  DELTASONE  Take 2 tablets (40 mg total) by mouth once daily.        CONTINUE taking these medications    albuterol 90 mcg/actuation inhaler  Commonly known as:  PROVENTIL/VENTOLIN HFA  Inhale 2 puffs into the lungs every 6 (six) hours as needed for Wheezing. Rescue     amLODIPine 10 MG tablet  Commonly known as:  NORVASC  Take 1 tablet (10 mg total) by " mouth once daily.     butalbital-aspirin-caffeine -40 mg -40 mg Cap  Commonly known as:  FIORINAL  Take 1 capsule by mouth every 4 (four) hours as needed.     levothyroxine 125 MCG tablet  Commonly known as:  SYNTHROID  Take 125 mcg by mouth once daily.     rosuvastatin 10 MG tablet  Commonly known as:  CRESTOR  Take 10 mg by mouth once daily.        STOP taking these medications    acetaminophen-codeine 300-30mg 300-30 mg Tab  Commonly known as:  TYLENOL #3     lisinopril 10 MG tablet            Indwelling Lines/Drains at time of discharge:   Lines/Drains/Airways          None          Time spent on the discharge of patient: 20 minutes  Patient was seen and examined on the date of discharge and determined to be suitable for discharge.         Vivienne Patrick NP  Department of Hospital Medicine  [facility]

## 2019-06-17 NOTE — ASSESSMENT & PLAN NOTE
Long time smoker with several admissions for COPD exacerbations  Has had pCO2 in 90s in the past  On past admissions BiPaP noncompliant as well  Had a long discussion with patient about importance of BiPaP. Needs it to improve and to prevent worsening. Could result in intubation if worsening. Will try to wear again today  Her mental status is improved--I suspect despite high Co2 she is not far from her baseline  She self increased her home O2 to 6L prior to arrival which also likely contributed to her retaining. Down to 2L NC and maintains sats > 92%. OK to keep around 88-92% right now  duonebs  Steroids      6/16: improving. Refuses BipaP overnight but worse yesterday. States will wear again today. ABG pending. On 2L NC, this is what she wears at home. Will have her amblate a bit more today as well  Dr. Vance added levaquin, will continue  duonebs  Steroids  She insists on being discharged tomorrow AM to pay rent on her PO Box--discussed will see how she is doing to be sure she is safe for discharge    6/17 she is still begging for d/c today. She is not 100% but does report feeling much better. No elevated WBC. Sats 96% 3L NC. Has home O2. Also has nebulizer at home and has meds.   She has improved significantly so I think she will be safe to complete PO levaquin and prednisone as outpatient. I fear she will leave AMA if we don't discharge today.

## 2019-06-17 NOTE — SUBJECTIVE & OBJECTIVE
Interval History: just had a shower feeling better     Objective:     Vital Signs (Most Recent):  Temp: 96.6 °F (35.9 °C) (06/17/19 0416)  Pulse: 77 (06/17/19 0743)  Resp: (!) 22 (06/17/19 0743)  BP: 137/65 (06/17/19 0743)  SpO2: 96 % (06/17/19 0743) Vital Signs (24h Range):  Temp:  [96.6 °F (35.9 °C)-99 °F (37.2 °C)] 96.6 °F (35.9 °C)  Pulse:  [62-92] 77  Resp:  [15-27] 22  SpO2:  [94 %-99 %] 96 %  BP: ()/(54-73) 137/65     Weight: 48.5 kg (107 lb)  Body mass index is 18.37 kg/m².      Intake/Output Summary (Last 24 hours) at 6/17/2019 0757  Last data filed at 6/17/2019 0600  Gross per 24 hour   Intake 1120 ml   Output --   Net 1120 ml       Physical Exam   Constitutional: She is oriented to person, place, and time. She appears well-developed and well-nourished. She is cooperative.  Non-toxic appearance. She does not appear ill. No distress.   HENT:   Head: Normocephalic and atraumatic.   Right Ear: Hearing, tympanic membrane, external ear and ear canal normal.   Left Ear: Hearing, tympanic membrane, external ear and ear canal normal.   Nose: Nose normal. No mucosal edema, rhinorrhea or nasal deformity. No epistaxis. Right sinus exhibits no maxillary sinus tenderness and no frontal sinus tenderness. Left sinus exhibits no maxillary sinus tenderness and no frontal sinus tenderness.   Mouth/Throat: Uvula is midline, oropharynx is clear and moist and mucous membranes are normal. No trismus in the jaw. Normal dentition. No uvula swelling. No posterior oropharyngeal erythema.   Eyes: Conjunctivae and lids are normal. No scleral icterus.   Sclera clear bilat   Neck: Trachea normal, full passive range of motion without pain and phonation normal. Neck supple.   Cardiovascular: Normal rate, regular rhythm, normal heart sounds, intact distal pulses and normal pulses.   Pulmonary/Chest: She is in respiratory distress (mild to moderate). She has decreased breath sounds in the right lower field and the left lower field.  She has no wheezes. She has no rhonchi.   Abdominal: Soft. Normal appearance and bowel sounds are normal. She exhibits no distension. There is no tenderness.   Musculoskeletal: Normal range of motion. She exhibits no edema or deformity.   Neurological: She is alert and oriented to person, place, and time. She exhibits normal muscle tone. Coordination normal.   Skin: Skin is warm, dry and intact. She is not diaphoretic. No pallor.   Psychiatric: She has a normal mood and affect. Her speech is normal and behavior is normal. Judgment and thought content normal. Cognition and memory are normal.   Nursing note and vitals reviewed.      Vents:  Oxygen Concentration (%): 35 (06/16/19 1600)    Lines/Drains/Airways     Peripheral Intravenous Line                 Peripheral IV - Single Lumen 06/15/19 1309 20 G Anterior;Left Forearm 1 day                Significant Labs:    CBC/Anemia Profile:  Recent Labs   Lab 06/16/19  0351 06/17/19  0609   WBC 5.11 4.70   HGB 12.2 11.2*   HCT 40.2 37.1    255   MCV 95 95   RDW 13.3 13.2        Chemistries:  Recent Labs   Lab 06/16/19  0351 06/17/19  0609    137   K 4.7 4.9   CL 93* 93*   CO2 35* 35*   BUN 13 11   CREATININE 0.7 0.6   CALCIUM 9.3 9.5   ALBUMIN 3.5 3.2*   PROT 6.5 6.1   BILITOT 0.1 0.1   ALKPHOS 79 67   ALT 19 17   AST 22 17       All pertinent labs within the past 24 hours have been reviewed.    Significant Imaging:  I have reviewed all pertinent imaging results/findings within the past 24 hours.

## 2019-06-17 NOTE — SUBJECTIVE & OBJECTIVE
Interval History: feeling better, refuses BiPaP    Review of Systems   Constitutional: Negative for activity change, fatigue, fever and unexpected weight change.   HENT: Negative for congestion, ear pain, hearing loss, rhinorrhea and sore throat.    Eyes: Negative for pain, redness and visual disturbance.   Respiratory: Positive for cough (somtimes productive; improved). Negative for shortness of breath and wheezing.    Cardiovascular: Negative for chest pain, palpitations and leg swelling.   Gastrointestinal: Negative for abdominal pain, blood in stool, constipation, diarrhea, nausea and vomiting.   Genitourinary: Negative for dysuria, frequency, pelvic pain and urgency.   Musculoskeletal: Positive for back pain (chronic). Negative for joint swelling and neck pain.   Skin: Negative for color change, rash and wound.   Neurological: Positive for headaches (chronic). Negative for dizziness, tremors, weakness and light-headedness.     Objective:     Vital Signs (Most Recent):  Temp: 96.6 °F (35.9 °C) (06/17/19 0416)  Pulse: 101 (06/17/19 0800)  Resp: (!) 22 (06/17/19 0743)  BP: 137/65 (06/17/19 0743)  SpO2: 96 % (06/17/19 0743) Vital Signs (24h Range):  Temp:  [96.6 °F (35.9 °C)-99 °F (37.2 °C)] 96.6 °F (35.9 °C)  Pulse:  [] 101  Resp:  [15-27] 22  SpO2:  [94 %-99 %] 96 %  BP: ()/(54-73) 137/65     Weight: 48.5 kg (107 lb)  Body mass index is 18.37 kg/m².    Intake/Output Summary (Last 24 hours) at 6/17/2019 0843  Last data filed at 6/17/2019 0600  Gross per 24 hour   Intake 1120 ml   Output --   Net 1120 ml      Physical Exam   Constitutional: She is oriented to person, place, and time. She appears well-developed and well-nourished. No distress.   HENT:   Head: Normocephalic and atraumatic.   Right Ear: External ear normal.   Left Ear: External ear normal.   Eyes: Pupils are equal, round, and reactive to light. Conjunctivae and EOM are normal. Right eye exhibits no discharge. Left eye exhibits no  "discharge.   Neck: Neck supple. No tracheal deviation present.   Cardiovascular: Normal rate and regular rhythm.   No murmur heard.  Pulmonary/Chest: Effort normal. No respiratory distress. She has no wheezes. She has no rales.   Improved airflow b/l   Abdominal: Soft. Bowel sounds are normal. She exhibits no distension. There is no tenderness.   Neurological: She is alert and oriented to person, place, and time. No cranial nerve deficit.   Skin: Skin is warm and dry.   Psychiatric: She has a normal mood and affect. Her behavior is normal.   Nursing note and vitals reviewed.      Significant Labs:   CBC:   Recent Labs   Lab 06/16/19  0351 06/17/19  0609   WBC 5.11 4.70   HGB 12.2 11.2*   HCT 40.2 37.1    255     CMP:   Recent Labs   Lab 06/16/19  0351 06/17/19  0609    137   K 4.7 4.9   CL 93* 93*   CO2 35* 35*   * 132*   BUN 13 11   CREATININE 0.7 0.6   CALCIUM 9.3 9.5   PROT 6.5 6.1   ALBUMIN 3.5 3.2*   BILITOT 0.1 0.1   ALKPHOS 79 67   AST 22 17   ALT 19 17   ANIONGAP 10 9   EGFRNONAA >60 >60     TSH:   Recent Labs   Lab 06/14/19  1736   TSH 8.561*   free t4 0.78    Phos 3.7  Mag 2.0    Lab Results   Component Value Date    INR 0.9 06/14/2019    INR 1.0 06/14/2017    INR 1.1 05/18/2014     BNP  Recent Labs   Lab 06/14/19  1736   BNP 79     Recent Labs   Lab 06/14/19  1736     122   CPKMB 3.5   TROPONINI <0.006   MB 2.9         Lactic acid 0.6    Urinalysis: negative  UDS + opiate     Blood cultures NGTD x 2    Significant Imaging:    CXR 6/14/19: "Stable chest x-ray with no acute finding"    EKG Normal sinus rhythm  Possible Left atrial enlargement  Rightward axis  Pulmonary disease pattern  ST and T wave abnormality, consider anterior ischemia  Abnormal ECG  When compared with ECG of 14-JUN-2017 15:29,  T wave inversion more evident in Anterior leads  "

## 2019-06-17 NOTE — ASSESSMENT & PLAN NOTE
Started on BiPaP on admission  She is wearing intermittently  Improving  ABG this AM pending  Due to COPD/continued tobacco use    Improving, d/c on home O2, discussed not increasing at home O2 due to CO2 retention  Home on levaquin and prednisone

## 2019-06-17 NOTE — PROGRESS NOTES
[facility]  Pulmonology  Progress Note    Patient Name: Di Deluca  MRN: [id number]  Admission Date: 6/14/2019  Hospital Length of Stay: 3 days  Code Status: Full Code  Attending Provider: Claudia Higginbotham MD  Primary Care Provider: Thomas Huertas MD   Principal Problem: COPD exacerbation    Subjective:     Interval History: just had a shower feeling better     Objective:     Vital Signs (Most Recent):  Temp: 96.6 °F (35.9 °C) (06/17/19 0416)  Pulse: 77 (06/17/19 0743)  Resp: (!) 22 (06/17/19 0743)  BP: 137/65 (06/17/19 0743)  SpO2: 96 % (06/17/19 0743) Vital Signs (24h Range):  Temp:  [96.6 °F (35.9 °C)-99 °F (37.2 °C)] 96.6 °F (35.9 °C)  Pulse:  [62-92] 77  Resp:  [15-27] 22  SpO2:  [94 %-99 %] 96 %  BP: ()/(54-73) 137/65     Weight: 48.5 kg (107 lb)  Body mass index is 18.37 kg/m².      Intake/Output Summary (Last 24 hours) at 6/17/2019 0757  Last data filed at 6/17/2019 0600  Gross per 24 hour   Intake 1120 ml   Output --   Net 1120 ml       Physical Exam   Constitutional: She is oriented to person, place, and time. She appears well-developed and well-nourished. She is cooperative.  Non-toxic appearance. She does not appear ill. No distress.   HENT:   Head: Normocephalic and atraumatic.   Right Ear: Hearing, tympanic membrane, external ear and ear canal normal.   Left Ear: Hearing, tympanic membrane, external ear and ear canal normal.   Nose: Nose normal. No mucosal edema, rhinorrhea or nasal deformity. No epistaxis. Right sinus exhibits no maxillary sinus tenderness and no frontal sinus tenderness. Left sinus exhibits no maxillary sinus tenderness and no frontal sinus tenderness.   Mouth/Throat: Uvula is midline, oropharynx is clear and moist and mucous membranes are normal. No trismus in the jaw. Normal dentition. No uvula swelling. No posterior oropharyngeal erythema.   Eyes: Conjunctivae and lids are normal. No scleral icterus.   Sclera clear bilat   Neck: Trachea normal, full passive  range of motion without pain and phonation normal. Neck supple.   Cardiovascular: Normal rate, regular rhythm, normal heart sounds, intact distal pulses and normal pulses.   Pulmonary/Chest: She is in respiratory distress (mild to moderate). She has decreased breath sounds in the right lower field and the left lower field. She has no wheezes. She has no rhonchi.   Abdominal: Soft. Normal appearance and bowel sounds are normal. She exhibits no distension. There is no tenderness.   Musculoskeletal: Normal range of motion. She exhibits no edema or deformity.   Neurological: She is alert and oriented to person, place, and time. She exhibits normal muscle tone. Coordination normal.   Skin: Skin is warm, dry and intact. She is not diaphoretic. No pallor.   Psychiatric: She has a normal mood and affect. Her speech is normal and behavior is normal. Judgment and thought content normal. Cognition and memory are normal.   Nursing note and vitals reviewed.      Vents:  Oxygen Concentration (%): 35 (06/16/19 1600)    Lines/Drains/Airways     Peripheral Intravenous Line                 Peripheral IV - Single Lumen 06/15/19 1309 20 G Anterior;Left Forearm 1 day                Significant Labs:    CBC/Anemia Profile:  Recent Labs   Lab 06/16/19  0351 06/17/19  0609   WBC 5.11 4.70   HGB 12.2 11.2*   HCT 40.2 37.1    255   MCV 95 95   RDW 13.3 13.2        Chemistries:  Recent Labs   Lab 06/16/19  0351 06/17/19  0609    137   K 4.7 4.9   CL 93* 93*   CO2 35* 35*   BUN 13 11   CREATININE 0.7 0.6   CALCIUM 9.3 9.5   ALBUMIN 3.5 3.2*   PROT 6.5 6.1   BILITOT 0.1 0.1   ALKPHOS 79 67   ALT 19 17   AST 22 17       All pertinent labs within the past 24 hours have been reviewed.    Significant Imaging:  I have reviewed all pertinent imaging results/findings within the past 24 hours.    Assessment/Plan:     * COPD exacerbation  Severe 92 CO2   po2 66 on 2 liters which means her po2 on Ra would be below 55    Acute respiratory  failure with hypoxia and hypercarbia  Wears o2  Had inhaled bug spray from a bug bomb to kill roaches     Tobacco abuse  Needs to quit  Still smoking      Much better     Javier Vance MD  Pulmonology  [facility]

## 2019-06-17 NOTE — DISCHARGE INSTRUCTIONS
Treatment for COPD    Your healthcare provider will prescribe the best treatments for your COPD.  Treatment  Recommendations include the following:  · Medicines. Some medicines help relieve symptoms when you have them. Others are taken daily to control inflammation in the lungs. Always take your medicines as prescribed. Learn the names of your medicines, as well as how and when to use them.  · Oxygen therapy. Oxygen may be prescribed if tests show that your blood contains too little oxygen.  · Smoking. If you smoke, quit. Smoking is the main cause of COPD. Quitting will help you be able to better manage your COPD. Ask your healthcare provider about ways to help you quit smoking.  · Avoiding infections. Infections, like a cold or the flu, can cause your symptoms to worsen. Try to stay away from people who are sick. Wash your hands often. And, ask your healthcare provider about vaccines for the flu and pneumonia.  Coping with shortness of breath  Coping tips include the following:  · Exercise. Try to be as active as possible. This will improve energy levels and strengthen your muscles, so you can do more.  · Breathing techniques. Ask your healthcare provider or nurse to show you how to do pursed-lip breathing.  · Balance rest and activity. Each day, try to balance rest periods with activity. For example, you might start the day with getting dressed and eating breakfast, then relax and read the paper. After that, take a brief walk. And then sit with your feet up for a while.  · Pulmonary rehabilitation. Ask your provider, or call your local hospital to find out about pulmonary rehab programs. The programs help with managing your disease, breathing techniques, exercise, support and counseling.  · Healthy eating. Eating a healthy, balanced diet and making an effort to maintain your ideal weight are important to staying as healthy as possible. Make sure you have a lot of fruit and vegetables every day, as well as  balanced portions of whole grains, lean meats and fish, and low-fat dairy products.  Date Last Reviewed: 5/1/2016  © 7714-1681 [employer]. [address] [place] [address], [place] [address]. All rights reserved. This information is not intended as a substitute for professional medical care. Always follow your healthcare professional's instructions.    Home oxygen to continued.

## 2019-06-22 NOTE — DISCHARGE INSTRUCTIONS
**Follow up with Dermatology in 24-48 hours. Return to ER with worsening of symptoms.  Monitor for signs and symptoms of infection.    **Our goal in the emergency department is to always give you outstanding care and exceptional service. You may receive a survey by mail or e-mail in the next week regarding your experience in our ED. We would greatly appreciate your completing and returning the survey. Your feedback provides us with a way to recognize our staff who give very good care and it helps us learn how to improve when your experience was below our aspiration of excellence.

## 2019-06-22 NOTE — ED PROVIDER NOTES
Encounter Date: 6/22/2019       History     Chief Complaint   Patient presents with    Scabies     The history is provided by the patient.   Rash    This is a new problem. The current episode started several days ago. The problem has been gradually worsening. Associated with: Scabies exposure. Affected Location: Bilateral arms. Associated symptoms include itching. She has tried nothing for the symptoms.     Review of patient's allergies indicates:   Allergen Reactions    Benzodiazepines     Lidocaine     Toradol [ketorolac]     Tramadol      Past Medical History:   Diagnosis Date    Asthma     Back problem     Calcaneal spur     Chicken pox 2015    Hypertension     Rheumatic disease     Thyroid cancer      Past Surgical History:   Procedure Laterality Date    HYSTERECTOMY      left knee      THYROIDECTOMY      TONSILLECTOMY       Family History   Problem Relation Age of Onset    Depression Mother     Hypertension Sister     Blindness Neg Hx     Glaucoma Neg Hx     Macular degeneration Neg Hx     Retinal detachment Neg Hx      Social History     Tobacco Use    Smoking status: Current Every Day Smoker     Packs/day: 0.50     Years: 15.00     Pack years: 7.50     Types: Cigarettes    Smokeless tobacco: Never Used   Substance Use Topics    Alcohol use: No    Drug use: No     Review of Systems   Constitutional: Negative for fever.   HENT: Negative for congestion, ear pain, rhinorrhea, sore throat and trouble swallowing.    Eyes: Negative for pain, discharge, redness and visual disturbance.   Respiratory: Negative for cough, shortness of breath and wheezing.    Cardiovascular: Negative for chest pain and leg swelling.   Gastrointestinal: Negative for abdominal pain, constipation, diarrhea, nausea and vomiting.   Genitourinary: Negative for difficulty urinating, dysuria, flank pain, frequency and urgency.   Musculoskeletal: Negative for arthralgias, back pain, myalgias and neck pain.   Skin:  Positive for itching and rash. Negative for wound.   Neurological: Negative for seizures, weakness and headaches.   Psychiatric/Behavioral: Negative.        Physical Exam     Initial Vitals [06/22/19 1712]   BP Pulse Resp Temp SpO2   133/71 86 18 98.7 °F (37.1 °C) --      MAP       --         Physical Exam    Nursing note and vitals reviewed.  Constitutional: No distress.   HENT:   Head: Normocephalic and atraumatic.   Right Ear: External ear normal.   Left Ear: External ear normal.   Nose: Nose normal.   Mouth/Throat: Oropharynx is clear and moist.   Eyes: Conjunctivae, EOM and lids are normal. Pupils are equal, round, and reactive to light.   Neck: Neck supple.   Cardiovascular: Normal rate, regular rhythm, S1 normal, S2 normal, normal heart sounds and intact distal pulses.   Pulmonary/Chest: Effort normal and breath sounds normal. No respiratory distress.   Abdominal: Soft. Bowel sounds are normal. There is no tenderness.   Musculoskeletal: Normal range of motion.   Neurological: She is alert and oriented to person, place, and time. She has normal strength. GCS eye subscore is 4. GCS verbal subscore is 5. GCS motor subscore is 6.   Skin: Skin is warm and dry. Capillary refill takes less than 2 seconds. Rash noted. Rash is papular (Bilateral arms).   Psychiatric: She has a normal mood and affect. Her speech is normal and behavior is normal.         ED Course   Procedures                                   Clinical Impression:       ICD-10-CM ICD-9-CM   1. Scabies exposure Z20.89 V01.89   2. Rash R21 782.1   3. Itching L29.9 698.9     New Prescriptions    PERMETHRIN (ELIMITE) 5 % CREAM    Apply topically once. To neck/hairline down to soles of feet, wash off after 8-14 hours. Repeat in 14 days if symptoms continue. for 1 dose       Disposition:   Disposition: Discharged  Condition: Stable    The patient acknowledges that close follow up with medical provider is required. Instructed to follow up with Dermatology  within 2 days. Patient was given specific return precautions. The patient agrees to comply with all instruction and directions given in the ER.                       Devora Francis NP  06/22/19 8213

## 2019-07-03 NOTE — ED TRIAGE NOTES
68 y.o. female presents to ER ED 02/ED 02A   Chief Complaint   Patient presents with    Shortness of Breath   .   Here per [facility] c/o SOB, pt is suppposed to be on continuous 02 but tank was off today and started c/o SOB     Yes

## 2019-07-03 NOTE — ED PROVIDER NOTES
Encounter Date: 7/3/2019       History     Chief Complaint   Patient presents with    Shortness of Breath     Patient is a 68-year-old white female with COPD.  She is on home O2, but apparently did not have her unit on in became short of breath.  She presented here for evaluation.  She denies coughing, fever or chills.  No history of congestive heart failure is reported, she denies any peripheral edema.        Review of patient's allergies indicates:   Allergen Reactions    Benzodiazepines     Lidocaine     Toradol [ketorolac]     Tramadol      Past Medical History:   Diagnosis Date    Asthma     Back problem     Calcaneal spur     Chicken pox 2015    Hypertension     Rheumatic disease     Thyroid cancer      Past Surgical History:   Procedure Laterality Date    HYSTERECTOMY      left knee      THYROIDECTOMY      TONSILLECTOMY       Family History   Problem Relation Age of Onset    Depression Mother     Hypertension Sister     Blindness Neg Hx     Glaucoma Neg Hx     Macular degeneration Neg Hx     Retinal detachment Neg Hx      Social History     Tobacco Use    Smoking status: Current Every Day Smoker     Packs/day: 0.50     Years: 15.00     Pack years: 7.50     Types: Cigarettes    Smokeless tobacco: Never Used   Substance Use Topics    Alcohol use: No    Drug use: No     Review of Systems   Constitutional: Negative for fever.   HENT: Negative for congestion, ear pain, rhinorrhea, sore throat and trouble swallowing.    Eyes: Negative for pain.   Respiratory: Positive for shortness of breath and wheezing. Negative for cough.    Cardiovascular: Negative for chest pain, palpitations and leg swelling.   Gastrointestinal: Negative for abdominal pain, constipation, diarrhea and nausea.   Genitourinary: Negative for difficulty urinating, dysuria, flank pain, frequency, hematuria and urgency.   Musculoskeletal: Negative for arthralgias, back pain, myalgias and neck pain.   Skin: Negative for rash  and wound.   Neurological: Negative for speech difficulty, weakness and headaches.   Hematological: Does not bruise/bleed easily.       Physical Exam     Initial Vitals [07/03/19 1040]   BP Pulse Resp Temp SpO2   117/68 72 18 97.2 °F (36.2 °C) 95 %      MAP       --         Physical Exam    Constitutional: No distress.   HENT:   Head: Normocephalic and atraumatic.   Nose: Nose normal.   Mouth/Throat: Oropharynx is clear and moist.   Eyes: Conjunctivae and EOM are normal. Pupils are equal, round, and reactive to light.   Neck: Normal range of motion. Neck supple.   Cardiovascular: Normal rate, regular rhythm, normal heart sounds and intact distal pulses.   Pulmonary/Chest: No respiratory distress. She has wheezes.   Abdominal: Soft. Bowel sounds are normal. There is no tenderness.   Musculoskeletal: Normal range of motion.   Neurological: She is alert and oriented to person, place, and time. She has normal strength.   Skin: Skin is warm and dry. Rash noted.   Psychiatric: She has a normal mood and affect.         ED Course   Procedures  Labs Reviewed - No data to display       Imaging Results    None        Cardiac work-up NEGATIVE                       Clinical Impression:       ICD-10-CM ICD-9-CM   1. Chronic obstructive pulmonary disease, unspecified COPD type J44.9 496   2. Dyspnea R06.00 786.09         Disposition:   Disposition: Discharged  Condition: Stable                        Chato Quezada Jr., MD  07/03/19 1190

## 2019-07-03 NOTE — CONSULTS
[facility]  Cardiology  Consult Note    Patient Name: Di Deluca  MRN: [id number]  Admission Date: 7/3/2019  Hospital Length of Stay: 0 days  Code Status: Prior   Attending Provider: Chato Quezada Jr., MD   Consulting Provider: Valencia Becerril NP  Primary Care Physician: Thomas Huertas MD  Principal Problem:<principal problem not specified>    Patient information was obtained from patient, past medical records and ER records.     Consults  Subjective:     Chief Complaint:  SOB     HPI: 69 yo wf hx multiple sclerosis/chronic resp failure on on home O2 recent hospitalized requiring intubation 6/19 presented to [place] ER with c/o worsened SOB.   Troponin was drawn and found to be 0.03,   Denies CP. EKG shows NSST abl  She is lethargic, slow to answer questions    Past Medical History:   Diagnosis Date    Asthma     Back problem     Calcaneal spur     Chicken pox 2015    Hypertension     Rheumatic disease     Thyroid cancer        Past Surgical History:   Procedure Laterality Date    HYSTERECTOMY      left knee      THYROIDECTOMY      TONSILLECTOMY         Review of patient's allergies indicates:   Allergen Reactions    Benzodiazepines     Lidocaine     Toradol [ketorolac]     Tramadol        No current facility-administered medications on file prior to encounter.      Current Outpatient Medications on File Prior to Encounter   Medication Sig    albuterol 90 mcg/actuation inhaler Inhale 2 puffs into the lungs every 6 (six) hours as needed for Wheezing. Rescue    amlodipine (NORVASC) 10 MG tablet Take 1 tablet (10 mg total) by mouth once daily.    butalbital-aspirin-caffeine -40 mg (FIORINAL) -40 mg Cap Take 1 capsule by mouth every 4 (four) hours as needed.    levoFLOXacin (LEVAQUIN) 500 MG tablet Take 1 tablet (500 mg total) by mouth once daily.    levothyroxine (SYNTHROID) 125 MCG tablet Take 125 mcg by mouth once daily.    predniSONE (DELTASONE) 20 MG  tablet Take 2 tablets (40 mg total) by mouth once daily.    rosuvastatin (CRESTOR) 10 MG tablet Take 10 mg by mouth once daily.     Family History     Problem Relation (Age of Onset)    Depression Mother    Hypertension Sister        Tobacco Use    Smoking status: Current Every Day Smoker     Packs/day: 0.50     Years: 15.00     Pack years: 7.50     Types: Cigarettes    Smokeless tobacco: Never Used   Substance and Sexual Activity    Alcohol use: No    Drug use: No    Sexual activity: Never     Review of Systems   Constitution:        Gen weak, confusion     HENT: Negative.    Eyes: Negative.    Cardiovascular: Negative for chest pain and syncope.   Respiratory:        Chronic SOB   Endocrine: Negative.    Hematologic/Lymphatic: Negative.    Skin: Positive for itching.        Recent scabies   Musculoskeletal: Positive for muscle weakness.   Gastrointestinal: Negative.    Genitourinary: Negative.    Neurological: Negative.    Psychiatric/Behavioral: Negative.      Objective:     Vital Signs (Most Recent):  Temp: 97.2 °F (36.2 °C) (07/03/19 1040)  Pulse: 70 (07/03/19 1109)  Resp: 18 (07/03/19 1109)  BP: 117/68 (07/03/19 1040)  SpO2: 96 % (07/03/19 1109) Vital Signs (24h Range):  Temp:  [97.2 °F (36.2 °C)] 97.2 °F (36.2 °C)  Pulse:  [70-72] 70  Resp:  [18] 18  SpO2:  [95 %-96 %] 96 %  BP: (117)/(68) 117/68     Weight: 48.5 kg (107 lb)  Body mass index is 18.37 kg/m².    SpO2: 96 %  O2 Device (Oxygen Therapy): nasal cannula    No intake or output data in the 24 hours ending 07/03/19 1251    Lines/Drains/Airways          None          Physical Exam   Constitutional: She appears well-developed and well-nourished.   HENT:   Head: Normocephalic.   Neck: Normal range of motion.   Cardiovascular: Normal rate and regular rhythm.   No murmur heard.  Pulmonary/Chest: Effort normal and breath sounds normal.   Abdominal: Soft.   Musculoskeletal: Normal range of motion.   Neurological:   Appears confused, weak   Skin: Skin is  warm and dry.   Psychiatric: She has a normal mood and affect.   Nursing note and vitals reviewed.      Significant Labs:   BMP:   Recent Labs   Lab 07/03/19  1103      *   K 3.8   CL 79*   CO2 40*   BUN 17   CREATININE 0.7   CALCIUM 9.3   , CMP   Recent Labs   Lab 07/03/19  1103   *   K 3.8   CL 79*   CO2 40*      BUN 17   CREATININE 0.7   CALCIUM 9.3   PROT 6.6   ALBUMIN 3.7   BILITOT 0.3   ALKPHOS 72   AST 40   ALT 37   ANIONGAP 8   ESTGFRAFRICA >60   EGFRNONAA >60   , CBC   Recent Labs   Lab 07/03/19  1103   WBC 5.60   HGB 12.3   HCT 37.3       and Troponin   Recent Labs   Lab 07/03/19  1059   TROPONINI 0.038*       Significant Imaging: EKG: NSR NSST  Assessment and Plan:   Elevated troponin  Chronic resp failure/COPD on home O2  Multiple sclerosis--reported hx  HTN  Hyponatremia  Smoker  Hx thyroid cancer    Plan:  Presentation does not appear to be consistent with ACS. Trend CE  Will defer ASA at this time due to allergy to Toradol and no documented CAD   Outpt CV evaluation  Overall condition is poor.  Patient's history not consistent with ACS and no significant ECG changes or elevated cardiac enzymes.  Agree with second set of cardiac enzymes and d-dimer and if normal patient can be discharged from a cardiac standpoint.                                                                                                                  Patient seen an examined and agree with above.    VTE Risk Mitigation (From admission, onward)    None          Thank you for your consult. I will follow-up with patient. Please contact us if you have any additional questions.    Paul Alves MD  Cardiology   [facility]

## 2019-07-06 PROBLEM — J96.11 CHRONIC RESPIRATORY FAILURE WITH HYPOXIA: Status: ACTIVE | Noted: 2019-01-01

## 2019-07-06 PROBLEM — I50.9 ACUTE CONGESTIVE HEART FAILURE: Status: ACTIVE | Noted: 2019-01-01

## 2019-07-08 NOTE — ED TRIAGE NOTES
68 y.o. female presents to ER ED 03 /ED 03A   Chief Complaint   Patient presents with    Fall   pt reports slip/trip fall. Pt c/o pain to left forearm. Good distal circulation, radial pulse palpable. No acute distress noted.

## 2019-07-08 NOTE — ED PROVIDER NOTES
[facility]                                                 Chief Complaint  68 y.o. female with Fall    History of Present Illness  Di Deluca presents to the emergency room with left wrist the pain today  Patient has slip and fall on outstretched wrist with immediate pain and swelling  Patient on exam has obvious fracture no gross deformity on evaluation today  Patient on x-ray as an obvious fracture, impact mechanism, neurovascular intact  Patient describes no other injury, no head trauma or LOC with this fall this a.m.    The history is provided by the patient   device was not used during this ER visit  Medical history: Asthma, LBP, spur, chickenpox, HTN, rheumatic disease, thyroid disease  Surgeries: Hysterectomy, knee surgery, thyroidectomy, tonsils  Allergies: Benzodiazepines, Toradol, tramadol, lidocaine     I have reviewed all of this patient's past medical, surgical, family, and social   histories as well as active allergies and medications documented in the  electronic medical record    Review of Systems and Physical Exam      Review of Systems  -- Constitution - no fever, denies fatigue, no weakness, no chills  -- Eyes - no tearing or redness, no visual disturbance  -- Ear, Nose - no tinnitus or earache, no nasal congestion or discharge  -- Mouth,Throat - no sore throat, no toothache, normal voice, normal swallowing  -- Respiratory - denies cough and congestion, no shortness of breath, no HI  -- Cardiovascular - denies chest pain, no palpitations, denies claudication  -- Gastrointestinal - denies abdominal pain, nausea, vomiting, or diarrhea  -- Genitourinary - no dysuria, denies flank pain, no hematuria, no STD risk  -- Musculoskeletal - left wrist pain  -- Neurological - no headache, denies weakness or seizure; no LOC  -- Skin - denies pallor, rash, or changes in skin. no hives or welts noted    Vital Signs  Her oral temperature is 96.3 °F (35.7 °C).   Her  blood pressure is 136/77 and her pulse is 87.   Her respiration is 20 and oxygen saturation is 98%.     Physical Exam  -- Nursing note and vitals reviewed  -- Constitutional: Appears well-developed and well-nourished  -- Head: Atraumatic. Normocephalic. No obvious abnormality  -- Eyes: Pupils are equal and reactive to light. Normal conjunctiva and lids  -- Cardiac: Normal rate, regular rhythm and normal heart sounds  -- Pulmonary: Normal respiratory effort, breath sounds clear to auscultation  -- Abdominal: Soft, no tenderness. Normal bowel sounds. Normal liver edge  -- Musculoskeletal:  Deformity to the left breast, good distal pulses  -- Neurological: No focal deficits. Showed good interaction with staff  -- Vascular: Posterior tibial, dorsalis pedis and radial pulses 2+ bilaterally      Emergency Room Course      Left wrist x-ray  Comminuted intra-articular fracture the left   distal radius is noted which demonstrates impaction   of the fracture fragments. There is also well as   place a fracture of the ulnar styloid.    Splint Application  -- Date/Time: 3:05 PM 7/8/2019   -- Performed by: Pal Helton M.D.  -- Consent Done: Emergent Situation  -- Location details:  Forearm  -- Supplies used: cotton padding and Ortho-Glass  -- Post-procedure: Neurovascularly unchanged following the procedure  -- Patient tolerance: Tolerated the procedure well     Medications Given  ondansetron injection 4 mg (has no administration in time range)   morphine injection 2 mg (has no administration in time range)     ED Physician Management  -- Diagnosis management comments: 68 y.o. female with left wrist fracture  -- patient was extensively counseled she needs to follow up with Orthopedics  -- patient will follow up with Orthopedics in next 48 hours, will need surgery  -- patient is neurovascular intact, splinted appropriately in the ER today  -- family at bedside states they will make an orthopedic appointment  tomorrow    Diagnosis  -- The primary encounter diagnosis was Closed fracture of left wrist, initial encounter.   -- A diagnosis of Left arm pain was also pertinent to this visit.    Disposition and Plan  -- Disposition: home  -- Condition: stable  -- Follow-up: Patient to follow up with Orthopedics in 1-2 days.  -- I advised the patient that we have found no life threatening condition today  -- At this time, I believe the patient is clinically stable for discharge.   -- The patient acknowledges that close follow up with a MD is required   -- Patient agrees to comply with all instruction and direction given in the ER    This note is dictated on M*Modal word recognition program.  There are word recognition mistakes that are occasionally missed on review.         Pal Helton MD  07/08/19 [id number]

## 2019-07-12 PROBLEM — F11.90 CHRONIC NARCOTIC USE: Status: ACTIVE | Noted: 2019-01-01

## 2019-07-12 PROBLEM — J44.9 CHRONIC OBSTRUCTIVE PULMONARY DISEASE: Status: ACTIVE | Noted: 2019-01-01

## 2019-07-12 PROBLEM — S52.532D CLOSED COLLES' FRACTURE OF LEFT RADIUS WITH ROUTINE HEALING: Status: ACTIVE | Noted: 2019-01-01

## 2019-07-12 PROBLEM — Z59.00 HOMELESS SINGLE PERSON: Status: ACTIVE | Noted: 2019-01-01

## 2019-07-12 PROBLEM — S52.615D CLOSED NONDISPLACED FRACTURE OF STYLOID PROCESS OF LEFT ULNA WITH ROUTINE HEALING: Status: ACTIVE | Noted: 2019-01-01

## 2019-07-12 NOTE — ED PROVIDER NOTES
[facility]                                                 Chief Complaint  68 y.o. female with Shortness of Breath    History of Present Illness  Di Deluca presents to the emergency room with shortness of breath today  Patient has shortness of breath, longstanding history of COPD reported this p.m.  This is not new to us, this patient has been to the ER several times in last 2 months  No signs of distress currently with active wheezing on ER presentation this morning  Patient is on 4 liters of oxygen at home still smokes at least 2 packs a day for years    The history is provided by the patient   device was not used during this ER visit  Medical history: Asthma, LBP, spur, chickenpox, HTN, rheumatic disease, thyroid disease  Surgeries: Hysterectomy, knee surgery, thyroidectomy, tonsils  Allergies: Benzodiazepines, Toradol, tramadol, lidocaine    I have reviewed all of this patient's past medical, surgical, family, and social   histories as well as active allergies and medications documented in the  electronic medical record    Review of Systems and Physical Exam      Review of Systems  -- Constitution - no fever, denies fatigue, no weakness, no chills  -- Eyes - no tearing or redness, no visual disturbance  -- Ear, Nose - no tinnitus or earache, no nasal congestion or discharge  -- Mouth,Throat - no sore throat, no toothache, normal voice, normal swallowing  -- Respiratory - cough and congestion, shortness of breath, no HI  -- Cardiovascular - denies chest pain, no palpitations, denies claudication  -- Gastrointestinal - denies abdominal pain, nausea, vomiting, or diarrhea  -- Genitourinary - no dysuria, denies flank pain, no hematuria, no STD risk  -- Musculoskeletal - denies back pain, negative for trauma or injury  -- Neurological - no headache, denies weakness or seizure; no LOC  -- Skin - denies pallor, rash, or changes in skin. no hives or welts noted  --  Psychiatric - Denies SI or HI, no psychosis or fractured thought noted     Vital Signs  Her temperature is 98.5 °F (36.9 °C).   Her blood pressure is 173/85 and her pulse is 88.  Her respiration is 22 and oxygen saturation is 99%.     Physical Exam  -- Nursing note and vitals reviewed  -- Constitutional: Appears well-developed and well-nourished  -- Head: Atraumatic. Normocephalic. No obvious abnormality  -- Eyes: Pupils are equal and reactive to light. Normal conjunctiva and lids  -- Cardiac: Normal rate, regular rhythm and normal heart sounds  -- Pulmonary: faint rhonchi at the bilateral bases with active wheezing   -- Abdominal: Soft, no tenderness. Normal bowel sounds. Normal liver edge  -- Musculoskeletal: Normal range of motion, no effusions. Joints stable   -- Neurological: No focal deficits. Showed good interaction with staff  -- Vascular: Posterior tibial, dorsalis pedis and radial pulses 2+ bilaterally      Emergency Room Course      Lab Results   (L)   K 4.4   CL 76 (L)   CO2 44 (HH)   BUN 9   CREATININE 0.6    (H)   ALKPHOS 73   AST 26   ALT 19   BILITOT 0.3   ALBUMIN 3.6   PROT 6.9   WBC 6.14   HGB 12.4   HCT 40.5            CPKMB 5.1   TROPONINI 0.010   INR 1.0    (H)   DDIMER 1.15 (H)   LACTATE 0.6     EKG  -- The EKG findings today were without concerning findings from baseline     Radiology  -- severe COPD changes    Additional Work up  -- Blood cultures have also been drawn, results are pending    Medications Given  methylPREDNISolone sodium succinate injection 125 mg (125 mg Intravenous Given 7/12/19 1056)   azithromycin 500 mg in dextrose 5 % 250 mL IVPB (ready to mix system) (has no administration in time range)   cefTRIAXone (ROCEPHIN) 1 g in dextrose 5 % 50 mL IVPB (has no administration in time range)   albuterol-ipratropium 2.5 mg-0.5 mg/3 mL nebulizer solution 3 mL (3 mLs Nebulization Given 7/12/19 1031)     Diagnosis  -- The primary encounter  diagnosis was Chronic obstructive pulmonary disease, unspecified COPD type.   -- A diagnosis of SOB (shortness of breath) was also pertinent to this visit.    Disposition and Plan  -- Disposition: admit  -- Condition: stable    This note is dictated on M*Modal word recognition program.  There are word recognition mistakes that are occasionally missed on review.         Pal Helton MD  07/12/19 4738
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
## 2019-07-12 NOTE — HPI
"  Di Deluca is a 68 y.o. female with PMH of severe COPD /on  Home oxygen ,   LBP, spur, chickenpox, HTN, rheumatic disease, thyroid disease parented to ER with shortness of breath.  She used to live in [place] ; moved to her friend here " got kicked out of house "  Friend reports pain med issues . Multiple ER visit >  Seems home less ; has two living daughters :[place]. Recently fell broke left arm  Seen Dr damien armendariz ; went to er for pain meds and COPD   Patient has shortness of breath, longstanding history of COPD reported this p.m.  This is not new to us, this patient has been to the ER several times in last 2 months  She had  active wheezing on ER presentation this morning  Patient is on 4 liters of oxygen at home still smokes at least 2 packs a day for years.    Placed in observation for COPD exacerbation .  She will need NH placement ; no place to live ; multiple ER visits :  She was told for NH placement 3 months ago;she declined .    "

## 2019-07-12 NOTE — ED TRIAGE NOTES
68 y.o. female presents to ER ED 02/ED 02B   Chief Complaint   Patient presents with    Shortness of Breath   .   Here per [facility] with c/o SOB and running out of her medications. Reports hasn't taken her medications in three days

## 2019-07-12 NOTE — NURSING
Patient's NC found to be at 4L.  Instructed patient that her oxygen needs to remain at 2L.  Patient instructed to breath out of her nose to obtain adequate oxygen intake.

## 2019-07-12 NOTE — PLAN OF CARE
07/12/19 1420   NICOLE Message   Medicare Outpatient and Observation Notification regarding financial responsibility Given to patient/caregiver;Explained to patient/caregiver;Signed/date by patient/caregiver   Date NICOLE was signed 07/12/19   Time NICOLE was signed 1415
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
## 2019-07-12 NOTE — H&P
"[facility] Medicine  History & Physical    Patient Name: Di Deluca  MRN: [id number]  Admission Date: 7/12/2019  Attending Physician: Vee Tejeda MD   Primary Care Provider: Thomas Huertas MD         Patient information was obtained from patient, past medical records and ER records.     Subjective:     Principal Problem:COPD exacerbation    Chief Complaint:   Chief Complaint   Patient presents with    Shortness of Breath     COPD exacerbation         HPI:   Di Deluca is a 68 y.o. female with PMH of severe COPD /on  Home oxygen ,   LBP, spur, chickenpox, HTN, rheumatic disease, thyroid disease parented to ER with shortness of breath.  She used to live in [place] ; moved to her friend here " got kicked out of house "  Friend reports pain med issues . Multiple ER visit >  Seems home less ; has two living daughters :[place]. Recently fell broke left arm  Seen Dr damien armendariz ; went to er for pain meds and COPD   Patient has shortness of breath, longstanding history of COPD reported this p.m.  This is not new to us, this patient has been to the ER several times in last 2 months  She had  active wheezing on ER presentation this morning  Patient is on 4 liters of oxygen at home still smokes at least 2 packs a day for years.    Placed in observation for COPD exacerbation .  She will need NH placement ; no place to live ; multiple ER visits :  She was told for NH placement 3 months ago;she declined       Past Medical History:   Diagnosis Date    Asthma     Back problem     Calcaneal spur     Chicken pox 2015    Hypertension     Rheumatic disease     Thyroid cancer        Past Surgical History:   Procedure Laterality Date    HYSTERECTOMY      left knee      THYROIDECTOMY      TONSILLECTOMY         Review of patient's allergies indicates:   Allergen Reactions    Benzodiazepines     Lidocaine     Toradol [ketorolac]     Tramadol        No current " facility-administered medications on file prior to encounter.      Current Outpatient Medications on File Prior to Encounter   Medication Sig    HYDROcodone-acetaminophen (NORCO)  mg per tablet Take 1 tablet by mouth every 6 (six) hours as needed (pain).    albuterol 90 mcg/actuation inhaler Inhale 2 puffs into the lungs every 6 (six) hours as needed for Wheezing. Rescue    albuterol-ipratropium (DUO-NEB) 2.5 mg-0.5 mg/3 mL nebulizer solution Take 3 mLs by nebulization every 6 (six) hours as needed for Wheezing or Shortness of Breath. Rescue    amlodipine (NORVASC) 10 MG tablet Take 1 tablet (10 mg total) by mouth once daily.    butalbital-aspirin-caffeine -40 mg (FIORINAL) -40 mg Cap Take 1 capsule by mouth every 4 (four) hours as needed.    levothyroxine (SYNTHROID) 125 MCG tablet Take 125 mcg by mouth once daily.    rosuvastatin (CRESTOR) 10 MG tablet Take 10 mg by mouth once daily.    tiZANidine 4 mg Cap Take 4 mg by mouth 4 (four) times daily as needed.     Family History     Problem Relation (Age of Onset)    Depression Mother    Hypertension Sister        Tobacco Use    Smoking status: Current Every Day Smoker     Packs/day: 0.50     Years: 15.00     Pack years: 7.50     Types: Cigarettes    Smokeless tobacco: Never Used   Substance and Sexual Activity    Alcohol use: No    Drug use: No    Sexual activity: Never     Review of Systems   Constitutional: Negative for chills and fever.   HENT: Negative for congestion, hearing loss, sinus pressure and sore throat.    Eyes: Negative for photophobia.   Respiratory: Positive for cough, shortness of breath and wheezing. Negative for choking and chest tightness.    Cardiovascular: Negative for chest pain and palpitations.   Gastrointestinal: Negative for blood in stool, nausea and vomiting.   Genitourinary: Negative for dysuria and hematuria.   Musculoskeletal: Negative for arthralgias and myalgias.        Left arm in cast    Skin:  Negative for pallor.   Neurological: Negative for dizziness and numbness.   Hematological: Does not bruise/bleed easily.   Psychiatric/Behavioral: Negative for confusion and suicidal ideas. The patient is nervous/anxious.      Objective:     Vital Signs (Most Recent):  Temp: 96.6 °F (35.9 °C) (07/12/19 1211)  Pulse: 78 (07/12/19 1211)  Resp: (!) 22 (07/12/19 1211)  BP: (!) 164/77 (07/12/19 1211)  SpO2: (!) 88 % (07/12/19 1211) Vital Signs (24h Range):  Temp:  [96.6 °F (35.9 °C)-98.5 °F (36.9 °C)] 96.6 °F (35.9 °C)  Pulse:  [78-88] 78  Resp:  [18-22] 22  SpO2:  [88 %-99 %] 88 %  BP: (164-187)/(77-90) 164/77     Weight: 47.7 kg (105 lb 2.6 oz)  Body mass index is 18.05 kg/m².    Physical Exam   Constitutional: She is oriented to person, place, and time. She appears well-developed and well-nourished.   HENT:   Head: Normocephalic and atraumatic.   Right Ear: External ear normal.   Left Ear: External ear normal.   Nose: Nose normal.   Mouth/Throat: Oropharynx is clear and moist.   Eyes: Pupils are equal, round, and reactive to light. Conjunctivae and EOM are normal.   Neck: Normal range of motion. Neck supple. No thyromegaly present.   Cardiovascular: Normal rate, regular rhythm, normal heart sounds and intact distal pulses.   Pulmonary/Chest: She is in respiratory distress. She has wheezes. She has rales.   Abdominal: Soft. Bowel sounds are normal.       Musculoskeletal: left arm in cast     Neurological: She is alert and oriented to person, place, and time. She has normal reflexes.   Skin: Skin is warm and dry.   Psychiatric: She has a normal mood and affect. Her behavior is normal. Judgment and thought content normal.   Nursing note and vitals reviewed.        CRANIAL NERVES     CN III, IV, VI   Pupils are equal, round, and reactive to light.  Extraocular motions are normal.        Significant Labs:   CBC:   Recent Labs   Lab 07/12/19  1017   WBC 6.14   HGB 12.4   HCT 40.5        CMP:   Recent Labs   Lab  07/12/19  1018   *   K 4.4   CL 76*   CO2 44*   *   BUN 9   CREATININE 0.6   CALCIUM 9.9   PROT 6.9   ALBUMIN 3.6   BILITOT 0.3   ALKPHOS 73   AST 26   ALT 19   ANIONGAP 11   EGFRNONAA >60     Troponin:   Recent Labs   Lab 07/12/19  1018   TROPONINI 0.010     TSH:   Recent Labs   Lab 06/14/19  1736   TSH 8.561*   Significant Imaging: CXR: I have reviewed all pertinent results/findings within the past 24 hours and my personal findings are:  see below   Chronic lung changes are noted bilaterally.  There is hyperlucency of the right lung likely related to underlying bullous change.  The heart is normal in size.  Calcified atheromatous disease affects the aorta.  Age-appropriate degenerative changes affect the skeleton.            Assessment/Plan:     * COPD exacerbation  IV steroids  IV antibiotics  Nebs   Pulmonary consult       Homeless single person  Will need NH placement       Chronic narcotic use  Avoid narcotics      Closed nondisplaced fracture of styloid process of left ulna with routine healing  S/p cast       Closed Colles' fracture of left radius with routine healing  S/p cast by orthopedics        Chronic obstructive pulmonary disease  Nebs  For now   Needs to stop smoking       Chronic respiratory failure with hypoxia  Continue nebs  Check ABG   May need BIPAP      Tobacco abuse  Nicotine patch       Essential hypertension  Will resume her BP pills.      Hyponatremia  Mos likely due to her pulmonary issues      VTE Risk Mitigation (From admission, onward)        Ordered     IP VTE HIGH RISK PATIENT  Once      07/12/19 1224     Place sequential compression device  Until discontinued      07/12/19 1224             Vee Tejeda MD  Department of Hospital Medicine   [facility]

## 2019-07-12 NOTE — PLAN OF CARE
07/12/19 1155   ED Admissions Case Approval   ED Admissions Case Approval CM Approved         Patient meets observation criteria at this time.   Spoke with Tracy ED nurse of this, she will relay message to Dr. Helton ED MD.    Criteria for Observation is as follows:      Chronic Obstructive Pulmonary Disease: Observation Care - Observation Care Admission Criteria       Criteria Review      Observation Care Admission Criteria   Most Recent : Valencia Matthew Most Recent Date: 07/12/2019 11:54 AM   (X) Observation is appropriate for  1 or more  of the following  (1) (2) (3) (4) (5) (6) (7):      (X) New or worsened (eg, from baseline) signs or symptoms of COPD (eg, dyspnea, Tachypnea, cough,       sputum production)      07/12/2019 11:54 AM by Valencia Matthew   Patient reports Shortness of breath          ( ) Previously mobile patient unable to walk between rooms      ( ) Inability to eat or sleep due to dyspnea      ( ) Prolonged, progressively worsening symptoms before emergency department visit      ( ) New-onset hypoxemia (room air SaO2 less than 90%, PO2 less than 60 mm Hg (8.0 kPa))      ( ) Worsening of pre-existing hypoxemia (eg, increased requirement for supplemental oxygen to       maintain oxygenation at baseline level)      ( ) New hypercarbia (PCO2 greater than 40 mm Hg (5.3 kPa))      ( ) Worsening of pre-existing hypercarbia (eg, documented PCO2 increase more than 5 mm Hg (0.7       kPa) from disease baseline)      ( ) Other observation care needs. See General Criteria: Observation Care guideline.

## 2019-07-12 NOTE — SUBJECTIVE & OBJECTIVE
Past Medical History:   Diagnosis Date    Asthma     Back problem     Calcaneal spur     Chicken pox 2015    Hypertension     Rheumatic disease     Thyroid cancer        Past Surgical History:   Procedure Laterality Date    HYSTERECTOMY      left knee      THYROIDECTOMY      TONSILLECTOMY         Review of patient's allergies indicates:   Allergen Reactions    Benzodiazepines     Lidocaine     Toradol [ketorolac]     Tramadol        No current facility-administered medications on file prior to encounter.      Current Outpatient Medications on File Prior to Encounter   Medication Sig    HYDROcodone-acetaminophen (NORCO)  mg per tablet Take 1 tablet by mouth every 6 (six) hours as needed (pain).    albuterol 90 mcg/actuation inhaler Inhale 2 puffs into the lungs every 6 (six) hours as needed for Wheezing. Rescue    albuterol-ipratropium (DUO-NEB) 2.5 mg-0.5 mg/3 mL nebulizer solution Take 3 mLs by nebulization every 6 (six) hours as needed for Wheezing or Shortness of Breath. Rescue    amlodipine (NORVASC) 10 MG tablet Take 1 tablet (10 mg total) by mouth once daily.    butalbital-aspirin-caffeine -40 mg (FIORINAL) -40 mg Cap Take 1 capsule by mouth every 4 (four) hours as needed.    levothyroxine (SYNTHROID) 125 MCG tablet Take 125 mcg by mouth once daily.    rosuvastatin (CRESTOR) 10 MG tablet Take 10 mg by mouth once daily.    tiZANidine 4 mg Cap Take 4 mg by mouth 4 (four) times daily as needed.     Family History     Problem Relation (Age of Onset)    Depression Mother    Hypertension Sister        Tobacco Use    Smoking status: Current Every Day Smoker     Packs/day: 0.50     Years: 15.00     Pack years: 7.50     Types: Cigarettes    Smokeless tobacco: Never Used   Substance and Sexual Activity    Alcohol use: No    Drug use: No    Sexual activity: Never     Review of Systems   Constitutional: Negative for chills and fever.   HENT: Negative for congestion, hearing  loss, sinus pressure and sore throat.    Eyes: Negative for photophobia.   Respiratory: Positive for cough, shortness of breath and wheezing. Negative for choking and chest tightness.    Cardiovascular: Negative for chest pain and palpitations.   Gastrointestinal: Negative for blood in stool, nausea and vomiting.   Genitourinary: Negative for dysuria and hematuria.   Musculoskeletal: Negative for arthralgias and myalgias.        Left arm in cast    Skin: Negative for pallor.   Neurological: Negative for dizziness and numbness.   Hematological: Does not bruise/bleed easily.   Psychiatric/Behavioral: Negative for confusion and suicidal ideas. The patient is nervous/anxious.      Objective:     Vital Signs (Most Recent):  Temp: 96.6 °F (35.9 °C) (07/12/19 1211)  Pulse: 78 (07/12/19 1211)  Resp: (!) 22 (07/12/19 1211)  BP: (!) 164/77 (07/12/19 1211)  SpO2: (!) 88 % (07/12/19 1211) Vital Signs (24h Range):  Temp:  [96.6 °F (35.9 °C)-98.5 °F (36.9 °C)] 96.6 °F (35.9 °C)  Pulse:  [78-88] 78  Resp:  [18-22] 22  SpO2:  [88 %-99 %] 88 %  BP: (164-187)/(77-90) 164/77     Weight: 47.7 kg (105 lb 2.6 oz)  Body mass index is 18.05 kg/m².    Physical Exam   Constitutional: She is oriented to person, place, and time. She appears well-developed and well-nourished.   HENT:   Head: Normocephalic and atraumatic.   Right Ear: External ear normal.   Left Ear: External ear normal.   Nose: Nose normal.   Mouth/Throat: Oropharynx is clear and moist.   Eyes: Pupils are equal, round, and reactive to light. Conjunctivae and EOM are normal.   Neck: Normal range of motion. Neck supple. No thyromegaly present.   Cardiovascular: Normal rate, regular rhythm, normal heart sounds and intact distal pulses.   Pulmonary/Chest: She is in respiratory distress. She has wheezes. She has rales.   Abdominal: Soft. Bowel sounds are normal.   Musculoskeletal: Normal range of motion.   Neurological: She is alert and oriented to person, place, and time. She has  normal reflexes.   Skin: Skin is warm and dry.   Psychiatric: She has a normal mood and affect. Her behavior is normal. Judgment and thought content normal.   Nursing note and vitals reviewed.        CRANIAL NERVES     CN III, IV, VI   Pupils are equal, round, and reactive to light.  Extraocular motions are normal.        Significant Labs:   CBC:   Recent Labs   Lab 07/12/19  1017   WBC 6.14   HGB 12.4   HCT 40.5        CMP:   Recent Labs   Lab 07/12/19  1018   *   K 4.4   CL 76*   CO2 44*   *   BUN 9   CREATININE 0.6   CALCIUM 9.9   PROT 6.9   ALBUMIN 3.6   BILITOT 0.3   ALKPHOS 73   AST 26   ALT 19   ANIONGAP 11   EGFRNONAA >60     Troponin:   Recent Labs   Lab 07/12/19  1018   TROPONINI 0.010     TSH:   Recent Labs   Lab 06/14/19  1736   TSH 8.561*   Significant Imaging: CXR: I have reviewed all pertinent results/findings within the past 24 hours and my personal findings are:  see below   Chronic lung changes are noted bilaterally.  There is hyperlucency of the right lung likely related to underlying bullous change.  The heart is normal in size.  Calcified atheromatous disease affects the aorta.  Age-appropriate degenerative changes affect the skeleton.

## 2019-07-13 PROBLEM — J96.02 ACUTE RESPIRATORY FAILURE WITH HYPOXIA AND HYPERCARBIA: Status: ACTIVE | Noted: 2019-01-01

## 2019-07-13 PROBLEM — J96.01 ACUTE RESPIRATORY FAILURE WITH HYPOXIA AND HYPERCARBIA: Status: ACTIVE | Noted: 2019-01-01

## 2019-07-13 NOTE — CONSULTS
[facility]  Pulmonology  Consult Note    Patient Name: Di Deluca  MRN: [id number]  Admission Date: 7/12/2019  Hospital Length of Stay: 1 days  Code Status: Full Code  Attending Physician: Vee Tejeda MD  Primary Care Provider: Thomas Huertas MD   Principal Problem: COPD exacerbation    Consults  Subjective:     HPI:  Di Deluca is a 68 y.o. year old female that's presents with a chief complaint of SOB and Wheezing for several days.pt quit smoking last week but if she is using narcotics.copd is severe PCO2 was 83 . Consulted to evaluate Respiratory status.    Past Medical History:   Diagnosis Date    Asthma     Back problem     Calcaneal spur     Chicken pox 2015    Hypertension     Rheumatic disease     Thyroid cancer         Past Surgical History:   Procedure Laterality Date    HYSTERECTOMY      left knee      THYROIDECTOMY      TONSILLECTOMY         Prior to Admission medications    Medication Sig Start Date End Date Taking? Authorizing Provider   HYDROcodone-acetaminophen (NORCO)  mg per tablet Take 1 tablet by mouth every 6 (six) hours as needed (pain). 7/8/19 7/18/19 Yes Pal Helton MD   albuterol 90 mcg/actuation inhaler Inhale 2 puffs into the lungs every 6 (six) hours as needed for Wheezing. Rescue 6/16/17 6/16/18  Juana Lyons MD   albuterol-ipratropium (DUO-NEB) 2.5 mg-0.5 mg/3 mL nebulizer solution Take 3 mLs by nebulization every 6 (six) hours as needed for Wheezing or Shortness of Breath. Rescue 7/6/19 7/5/20  Pallavi Sunkara, MD   amlodipine (NORVASC) 10 MG tablet Take 1 tablet (10 mg total) by mouth once daily. 6/16/17   Juana Lyons MD   butalbital-aspirin-caffeine -40 mg (FIORINAL) -40 mg Cap Take 1 capsule by mouth every 4 (four) hours as needed. 6/16/17   Juana Lyons MD   levothyroxine (SYNTHROID) 125 MCG tablet Take 125 mcg by mouth once daily.    Historical Provider, MD   rosuvastatin (CRESTOR) 10 MG tablet Take  10 mg by mouth once daily.    Historical Provider, MD   tiZANidine 4 mg Cap Take 4 mg by mouth 4 (four) times daily as needed. 7/3/19 7/13/19  Chato Quezada Jr., MD       Social History     Socioeconomic History    Marital status:      Spouse name: Not on file    Number of children: Not on file    Years of education: Not on file    Highest education level: Not on file   Occupational History    Not on file   Social Needs    Financial resource strain: Not on file    Food insecurity:     Worry: Not on file     Inability: Not on file    Transportation needs:     Medical: Not on file     Non-medical: Not on file   Tobacco Use    Smoking status: Current Every Day Smoker     Packs/day: 0.50     Years: 15.00     Pack years: 7.50     Types: Cigarettes    Smokeless tobacco: Never Used   Substance and Sexual Activity    Alcohol use: No    Drug use: No    Sexual activity: Never   Lifestyle    Physical activity:     Days per week: Not on file     Minutes per session: Not on file    Stress: Not on file   Relationships    Social connections:     Talks on phone: Not on file     Gets together: Not on file     Attends [employer] service: Not on file     Active member of club or organization: Not on file     Attends meetings of clubs or organizations: Not on file     Relationship status: Not on file   Other Topics Concern    Not on file   Social History Narrative    Not on file       Family History   Problem Relation Age of Onset    Depression Mother     Hypertension Sister     Blindness Neg Hx     Glaucoma Neg Hx     Macular degeneration Neg Hx     Retinal detachment Neg Hx        Review of patient's allergies indicates:   Allergen Reactions    Benzodiazepines     Lidocaine     Toradol [ketorolac]     Tramadol     Allergies have been reviewed.     ROS: ROS    PE:   Vitals:    07/13/19 0600 07/13/19 0704 07/13/19 0800 07/13/19 0801   BP:   129/79    BP Location:   Right arm    Patient Position:    Sitting    Pulse: 84 78 84 76   Resp:  20 19    Temp:   98 °F (36.7 °C)    TempSrc:   Tympanic    SpO2:  (!) 92% (!) 92%    Weight:       Height:        Physical Exam    Alert and orientated X 3   HEENT: Head: Normocephalic no trauma                Ears : Normal Pinna No Drainage no Battles sign                Eyes: Vision Unchanged, No conjunctivitis,No drainage                Neck: Supple, No JVD,No Abnormal Carotid Pulsations                Throat: No Erythema, No pus,No Swelling,Mallampati score= 2    Chest: Poor air entry bilaterally with wheezing and rhonchi  Cardiac: RRR S1+ S2 with a -S3: +M = 2/6, No R/H/G  Abdomen: Bowel Sounds are Normal.Soft Abdomen. No organomegaly of Liver,Spleen,or Kidneys   CNS: Non focal and intact. Cranial nerves 2, 346,8,9,10 and 12 are normal.Norrmal gait.Normal posture.  Extremities: No Clubbing,No Cyanosis with oxygen,Positive mild edema of lower extremities Bilateral  Skin: No Rash, No Ulcerative sores,and No cellulitis of the IV site.    Lab Results   Component Value Date    WBC 5.07 07/13/2019    HGB 11.9 (L) 07/13/2019    HCT 36.6 (L) 07/13/2019     07/13/2019    ALT 17 07/13/2019    AST 21 07/13/2019     (L) 07/13/2019    K 4.2 07/13/2019    CL 77 (L) 07/13/2019    CREATININE 0.6 07/13/2019    BUN 9 07/13/2019    CO2 41 (HH) 07/13/2019    TSH 8.561 (H) 06/14/2019    INR 1.0 07/12/2019    HGBA1C 6.6 (H) 06/15/2017               Assessment/Plan:     * COPD exacerbation  Patient with sob significant decreased BS    Acute respiratory failure with hypoxia and hypercarbia  pco2 is elevated Now a co2 retainer severe ?? narcotics    Chronic obstructive pulmonary disease  Decreased BS quit smoking last week    Chronic respiratory failure with hypoxia  Secondary to smoking     Tobacco abuse  Needs to stay off cigarettes        Needs to wear bipap but refuses   Thank you for your consult. I will follow-up with patient. Please contact us if you have any additional  questions.     Javier Vance MD  Pulmonology  [facility]

## 2019-07-13 NOTE — PROGRESS NOTES
Staff Handoff    Bedside report received from BAYRON Yanez. VS stable. Afebrile. No distress noted. Assessment complete per flowsheet. Call bell in reach. Instructed to call for any needs. Will continue to monitor for any change in status.  Resident Handoff

## 2019-07-13 NOTE — PROGRESS NOTES
"[facility] Hospital Medicine  Progress Note    Patient Name: Di Deluca  MRN: [id number]  Patient Class: IP- Inpatient   Admission Date: 7/12/2019  Length of Stay: 1 days  Attending Physician: Vee Tejeda MD  Primary Care Provider: Thomas Huertas MD        Subjective:     Principal Problem:COPD exacerbation      HPI:    Di Deluca is a 68 y.o. female with PMH of severe COPD /on  Home oxygen ,   LBP, spur, chickenpox, HTN, rheumatic disease, thyroid disease parented to ER with shortness of breath.  She used to live in [place] ; moved to her friend here " got kicked out of house "  Friend reports pain med issues . Multiple ER visit >  Seems home less ; has two living daughters :[place]. Recently fell broke left arm  Seen Dr damien armendariz ; went to er for pain meds and COPD   Patient has shortness of breath, longstanding history of COPD reported this p.m.  This is not new to us, this patient has been to the ER several times in last 2 months  She had  active wheezing on ER presentation this morning  Patient is on 4 liters of oxygen at home still smokes at least 2 packs a day for years.    Placed in observation for COPD exacerbation .  She will need NH placement ; no place to live ; multiple ER visits :  She was told for NH placement 3 months ago;she declined       Overview/Hospital Course:  7/13/19  Looks better ;keeps asking for pain meds  Again long discussion .  Shortness of breath better   Wheezing better   BIPAP ordered.  She is on rocephin , zithromax,     Review of Systems   Constitutional: Negative for chills and fever.   HENT: Negative for congestion, hearing loss, sinus pressure and sore throat.    Eyes: Negative for photophobia.   Respiratory: Positive for cough, shortness of breath and wheezing. Negative for apnea, choking and chest tightness.    Cardiovascular: Negative for chest pain and palpitations.   Gastrointestinal: Negative for blood in stool, nausea and " vomiting.   Genitourinary: Negative for dysuria and hematuria.   Musculoskeletal: Negative for arthralgias and myalgias.        Left arm in cast    Skin: Negative for pallor.   Neurological: Negative for dizziness and numbness.   Hematological: Does not bruise/bleed easily.   Psychiatric/Behavioral: Negative for confusion and suicidal ideas. The patient is nervous/anxious.      Objective:     Vital Signs (Most Recent):  Temp: 98 °F (36.7 °C) (07/13/19 0800)  Pulse: 76 (07/13/19 0801)  Resp: 19 (07/13/19 0800)  BP: 129/79 (07/13/19 0800)  SpO2: (!) 92 % (07/13/19 0800) Vital Signs (24h Range):  Temp:  [96.6 °F (35.9 °C)-98.5 °F (36.9 °C)] 98 °F (36.7 °C)  Pulse:  [71-88] 76  Resp:  [16-24] 19  SpO2:  [88 %-99 %] 92 %  BP: (128-187)/(67-90) 129/79     Weight: 47.7 kg (105 lb 2.6 oz)  Body mass index is 18.05 kg/m².    Intake/Output Summary (Last 24 hours) at 7/13/2019 0904  Last data filed at 7/12/2019 1711  Gross per 24 hour   Intake 240 ml   Output --   Net 240 ml      Physical Exam   Constitutional: She is oriented to person, place, and time. She appears well-developed and well-nourished.   HENT:   Head: Normocephalic and atraumatic.   Right Ear: External ear normal.   Left Ear: External ear normal.   Nose: Nose normal.   Mouth/Throat: Oropharynx is clear and moist.   Eyes: Pupils are equal, round, and reactive to light. Conjunctivae and EOM are normal.   Neck: Normal range of motion. Neck supple. No thyromegaly present.   Cardiovascular: Normal rate, regular rhythm, normal heart sounds and intact distal pulses.   Pulmonary/Chest: She has wheezes.   Abdominal: Soft. Bowel sounds are normal.   Musculoskeletal: Normal range of motion.   Neurological: She is alert and oriented to person, place, and time. She has normal reflexes.   Skin: Skin is warm and dry.   Psychiatric: She has a normal mood and affect. Her behavior is normal. Judgment and thought content normal.   Nursing note and vitals  reviewed.          Assessment/Plan:      * COPD exacerbation  IV steroids  IV antibiotics  Nebs   Pulmonary consult .      Homeless single person  Will need NH placement .      Chronic narcotic use  Avoid narcotics.      Closed nondisplaced fracture of styloid process of left ulna with routine healing  S/p cast   .    Closed Colles' fracture of left radius with routine healing  S/p cast by orthopedics.        Chronic obstructive pulmonary disease  Nebs  For now   Needs to stop smoking .      Chronic respiratory failure with hypoxia  Continue nebs  Check ABG    BIPAP.      Tobacco abuse  Nicotine patch .      Essential hypertension  Will resume her BP pills      Hyponatremia  Mos likely due to her pulmonary issues  BMP  Lab Results   Component Value Date     (L) 07/13/2019    K 4.2 07/13/2019    CL 77 (L) 07/13/2019    CO2 41 (HH) 07/13/2019    BUN 9 07/13/2019    CREATININE 0.6 07/13/2019    CALCIUM 9.7 07/13/2019    ANIONGAP 10 07/13/2019    ESTGFRAFRICA >60 07/13/2019    EGFRNONAA >60 07/13/2019             VTE Risk Mitigation (From admission, onward)        Ordered     IP VTE HIGH RISK PATIENT  Once      07/12/19 1224     Place sequential compression device  Until discontinued      07/12/19 1224                Vee Tejeda MD  Department of Hospital Medicine   [facility]

## 2019-07-13 NOTE — SUBJECTIVE & OBJECTIVE
Review of Systems   Constitutional: Negative for chills and fever.   HENT: Negative for congestion, hearing loss, sinus pressure and sore throat.    Eyes: Negative for photophobia.   Respiratory: Positive for cough, shortness of breath and wheezing. Negative for apnea, choking and chest tightness.    Cardiovascular: Negative for chest pain and palpitations.   Gastrointestinal: Negative for blood in stool, nausea and vomiting.   Genitourinary: Negative for dysuria and hematuria.   Musculoskeletal: Negative for arthralgias and myalgias.        Left arm in cast    Skin: Negative for pallor.   Neurological: Negative for dizziness and numbness.   Hematological: Does not bruise/bleed easily.   Psychiatric/Behavioral: Negative for confusion and suicidal ideas. The patient is nervous/anxious.      Objective:     Vital Signs (Most Recent):  Temp: 98 °F (36.7 °C) (07/13/19 0800)  Pulse: 76 (07/13/19 0801)  Resp: 19 (07/13/19 0800)  BP: 129/79 (07/13/19 0800)  SpO2: (!) 92 % (07/13/19 0800) Vital Signs (24h Range):  Temp:  [96.6 °F (35.9 °C)-98.5 °F (36.9 °C)] 98 °F (36.7 °C)  Pulse:  [71-88] 76  Resp:  [16-24] 19  SpO2:  [88 %-99 %] 92 %  BP: (128-187)/(67-90) 129/79     Weight: 47.7 kg (105 lb 2.6 oz)  Body mass index is 18.05 kg/m².    Intake/Output Summary (Last 24 hours) at 7/13/2019 0904  Last data filed at 7/12/2019 1711  Gross per 24 hour   Intake 240 ml   Output --   Net 240 ml      Physical Exam   Constitutional: She is oriented to person, place, and time. She appears well-developed and well-nourished.   HENT:   Head: Normocephalic and atraumatic.   Right Ear: External ear normal.   Left Ear: External ear normal.   Nose: Nose normal.   Mouth/Throat: Oropharynx is clear and moist.   Eyes: Pupils are equal, round, and reactive to light. Conjunctivae and EOM are normal.   Neck: Normal range of motion. Neck supple. No thyromegaly present.   Cardiovascular: Normal rate, regular rhythm, normal heart sounds and intact  distal pulses.   Pulmonary/Chest: She has wheezes.   Abdominal: Soft. Bowel sounds are normal.   Musculoskeletal: Normal range of motion.   Neurological: She is alert and oriented to person, place, and time. She has normal reflexes.   Skin: Skin is warm and dry.   Psychiatric: She has a normal mood and affect. Her behavior is normal. Judgment and thought content normal.   Nursing note and vitals reviewed.

## 2019-07-13 NOTE — HOSPITAL COURSE
7/13/19  Looks better ;keeps asking for pain meds  Again long discussion .  Shortness of breath better   Wheezing better   BIPAP ordered.  She is on rocephin , zithromax,     7/14/19  Breathing wise much better   Will need NH placement ; home less   Will taper steroids  Continue nebs and antibiotics     7/15/19  She is on day 4 azithromycin and rocephin. She has reduced medrol 40mg IV every 8hr. Using O2 (has 3L at home) here on 2L pox 95%. No fever. WBC 96056. Still smoker. nicotine patch ordered  Asking about her Fioricet for chronic pain as this is what she is using at home long term  She also reports that she has left short arm cast due to fall. Refused PT yesterday. Had it placed 1 week ago.     7/16/19 NAD overnight. 2LNC - O2 sat 98-99%  WBC 14.76> 11.31, afebrile   She is on day 5/5 azithromycin and rocephin. Getting prednisone 40mg bid; taper to 10mg bid then daily for home  Na+ 124>128 after 1LNS  Pt reports feeling is much better and breathing is much better.   She needs NH placement ; homeless ;with respiratory issues ;  Awaiting for nursing approval; level II screening in process .    7/17/19 NAD , Afebrile, WBC 8.18 , O2 98% on 2LNC   ABX completed for COPD exac, Prednisone 10mg bid;  this am   Na 129+ ,   Waiting on required level II forms for possible nursing home admission  7/18/19  Remains stable. Planned for psyche consult today for clearance  for Nursing home admission  pt able to ambulate without assistive device x 40 ft with CGA/SBA while on supplemental oxygen via NC. - O 2 sat 96-97%  Getting prednisone 10mg bid; -134   C/O left ear drainage, requesting lidocaine patches for left shoulder pain and low back pain. C/o inability to sleep due to pain.   Notes she broke left arm 13 days ago trying to break fall. States she needs to wear cast for 6 weeks. Again asking for pain Rx    7/19/19  Has been stable; awaiting  state approval for nursing home.   VSS. Afebrile. 2LNC - O2 sat  96-98%.   C/o vaginal yeast symptoms     7/20/19  Patient tolerating physical therapy.  COPD symptoms under control.  Lidocaine patches seem to be helping her pain.    7/21/19  Still waiting for nursing home placement.  Patient's COPD symptoms are much better.  She does require continuous oxygen.  Lidocaine patches help.  She slept well last night.  She is requesting Vistaril as needed for anxiety, Neosporin for the sores in her nose.    7/22/19  Case management working with patient for nursing home placement. Awaiting STATE approval of 90L 2nd ami screen. Dr Antonio did on 9/18. [facility] placement pending    Her COPD is at her baseline. Using home O2 at 2.5L NC     Refused PT yesterday stating that she was too weak. Saturday she did well 100ft x2 trials on 2.5 L supplemental O2.  Pt with very slow gait speed and very short step length requiring multiple standing rest breaks.    7/23/19   pt has recovered from her COPD exacerbation. Steroids stopped yesterday as she completed 7 days prednisone BID. She is not longer on abx. Breathing is at baseline with nebs every 4hr. No fever on home dose 02.   Still awaiting state for level 2 approval.    7/24 pt has finally state approval for nursing home placement. No changes to her status. OK to d/con current meds.

## 2019-07-13 NOTE — ASSESSMENT & PLAN NOTE
Mos likely due to her pulmonary issues  BMP  Lab Results   Component Value Date     (L) 07/13/2019    K 4.2 07/13/2019    CL 77 (L) 07/13/2019    CO2 41 (HH) 07/13/2019    BUN 9 07/13/2019    CREATININE 0.6 07/13/2019    CALCIUM 9.7 07/13/2019    ANIONGAP 10 07/13/2019    ESTGFRAFRICA >60 07/13/2019    EGFRNONAA >60 07/13/2019

## 2019-07-13 NOTE — PLAN OF CARE
Problem: Adult Inpatient Plan of Care  Goal: Plan of Care Review  Outcome: Ongoing (interventions implemented as appropriate)  Patient aware of plan of care. VS stable. Afebrile. IV antibiotics and steroids continued. NS with inverted t wave on tele. 2L O2 per NC in use, tolerating well. Breathing treatments. Plan is to find nursing home placement. Free from falls/injuries. No questions or concerns at this time. Agrees with plan of care.

## 2019-07-14 PROBLEM — J96.12 CHRONIC RESPIRATORY FAILURE WITH HYPOXIA AND HYPERCAPNIA: Status: ACTIVE | Noted: 2019-01-01

## 2019-07-14 NOTE — PT/OT/SLP PROGRESS
Physical Therapy   Progress note    Di Deluca   MRN: [id number]     Orders for PT evaluation received, spoke with patient's Nurse prior to entering patient's room. Nurse states that patient had a breathing treatment and requires rest at this time. Therefore will attempt therapy evaluation tomorrow 7/15/19.    Carlo Jaramillo, PT, DPT  7/14/19

## 2019-07-14 NOTE — ASSESSMENT & PLAN NOTE
IV steroids; taper from 125 to 40 mg .  In am po prednisone   IV antibiotics  Nebs   Pulmonary consult .

## 2019-07-14 NOTE — PLAN OF CARE
Problem: Adult Inpatient Plan of Care  Goal: Plan of Care Review  Outcome: Ongoing (interventions implemented as appropriate)  Patient aware of plan of care. VS stable. Afebrile. IV antibiotics and steroids continued. IV steroid dose decreased. PPD given in R arm. NS  on tele. 2L O2 per NC in use, tolerating well. Breathing treatments. Plan is to find nursing home placement. Free from falls/injuries. No questions or concerns at this time. Agrees with plan of care.

## 2019-07-14 NOTE — PROGRESS NOTES
"[facility] Medicine  Progress Note    Patient Name: Di Deluca  MRN: [id number]  Patient Class: IP- Inpatient   Admission Date: 7/12/2019  Length of Stay: 2 days  Attending Physician: Vee Tejeda MD  Primary Care Provider: Thomas Huertas MD        Subjective:     Principal Problem:COPD exacerbation      HPI:    Di Deluca is a 68 y.o. female with PMH of severe COPD /on  Home oxygen ,   LBP, spur, chickenpox, HTN, rheumatic disease, thyroid disease parented to ER with shortness of breath.  She used to live in [place] ; moved to her friend here " got kicked out of house "  Friend reports pain med issues . Multiple ER visit >  Seems home less ; has two living daughters :[place]. Recently fell broke left arm  Seen Dr damien armendariz ; went to er for pain meds and COPD   Patient has shortness of breath, longstanding history of COPD reported this p.m.  This is not new to us, this patient has been to the ER several times in last 2 months  She had  active wheezing on ER presentation this morning  Patient is on 4 liters of oxygen at home still smokes at least 2 packs a day for years.    Placed in observation for COPD exacerbation .  She will need NH placement ; no place to live ; multiple ER visits :  She was told for NH placement 3 months ago;she declined       Overview/Hospital Course:  7/13/19  Looks better ;keeps asking for pain meds  Again long discussion .  Shortness of breath better   Wheezing better   BIPAP ordered.  She is on rocephin , zithromax,     7/14/19  Breathing wise much better   Will need NH placement ; home less   Will taper steroids  Continue nebs and antibiotics       No new subjective & objective note has been filed under this hospital service since the last note was generated.      Assessment/Plan:      * COPD exacerbation  IV steroids; taper from 125 to 40 mg .  In am po prednisone   IV antibiotics  Nebs   Pulmonary consult .      Acute respiratory " failure with hypoxia and hypercarbia        Homeless single person  Will need NH placement .  PPD ordered for NH placement     Chronic narcotic use  Avoid narcotics.  tizinidine ordered       Closed nondisplaced fracture of styloid process of left ulna with routine healing  S/p cast   .    Closed Colles' fracture of left radius with routine healing  S/p cast by orthopedics.        Chronic obstructive pulmonary disease  Nebs  For now   Needs to stop smoking .      Chronic respiratory failure with hypoxia  Continue nebs  Check ABG    BIPAP.          Tobacco abuse  Nicotine patch .  Counseled again       Essential hypertension  Continue amlodipine     Hyponatremia  Mos likely due to her pulmonary issues  BMP  Lab Results   Component Value Date     (L) 07/13/2019    K 4.2 07/13/2019    CL 77 (L) 07/13/2019    CO2 41 (HH) 07/13/2019    BUN 9 07/13/2019    CREATININE 0.6 07/13/2019    CALCIUM 9.7 07/13/2019    ANIONGAP 10 07/13/2019    ESTGFRAFRICA >60 07/13/2019    EGFRNONAA >60 07/13/2019             VTE Risk Mitigation (From admission, onward)        Ordered     IP VTE HIGH RISK PATIENT  Once      07/12/19 1224     Place sequential compression device  Until discontinued      07/12/19 1224                Vee Tejeda MD  Department of Hospital Medicine   [facility]

## 2019-07-15 NOTE — ASSESSMENT & PLAN NOTE
Avoid narcotics.  tizinidine ordered   Fioricet ordered    Visit Information Date & Time Provider Department Dept. Phone Encounter #  
 8/18/2017  8:30 AM Rio Daley [id number] [phone] [id number] Your Appointments 12/21/2017  1:30 PM  
ROUTINE CARE with MD Aguilar Romero [facility]) Appt Note: 6m fu  
 [address], [place] [address] [place] [address]  
[address], [place] [phone] Upcoming Health Maintenance Date Due ZOSTER VACCINE AGE 60> 11/11/2006 COLONOSCOPY 10/1/2015 GLAUCOMA SCREENING Q2Y 1/1/2017 BREAST CANCER SCRN MAMMOGRAM 4/30/2017 INFLUENZA AGE 9 TO ADULT 8/1/2017 MEDICARE YEARLY EXAM 8/6/2017 Pneumococcal 65+ Low/Medium Risk (2 of 2 - PPSV23) 9/27/2017 DTaP/Tdap/Td series (2 - Td) 10/18/2021 Allergies as of 8/18/2017  Review Complete On: 8/18/2017 By: Thu Brannon Severity Noted Reaction Type Reactions Penicillins  07/18/2011    Anaphylaxis Current Immunizations  Reviewed on 10/18/2013 Name Date Influenza Vaccine 10/1/2016, 10/9/2013 Pneumococcal Conjugate (PCV-13) 11/11/2016 Tdap 10/18/2011 ZZZ-RETIRED (DO NOT USE) Pneumococcal Vaccine (Unspecified Type) 9/27/2012 Not reviewed this visit You Were Diagnosed With   
  
 Codes Comments Colon cancer screening    -  Primary ICD-10-CM: Z12.11 ICD-9-CM: V76.51 Medicare annual wellness visit, subsequent     ICD-10-CM: Z00.00 ICD-9-CM: V70.0 Vitals BP Pulse Temp Resp Height(growth percentile) Weight(growth percentile) 120/70 (BP 1 Location: Left arm, BP Patient Position: Sitting) 69 97.1 °F (36.2 °C) (Oral) 18 5' 3\" (1.6 m) 132 lb (59.9 kg) LMP SpO2 BMI OB Status Smoking Status (Exact Date) 98% 23.38 kg/m2 Hysterectomy Never Smoker Vitals History BMI and BSA Data  Body Mass Index Body Surface Area  
 23.38 kg/m 2 1.63 m 2  
  
  
 Preferred Pharmacy Pharmacy Name Phone [id number] [place], [address] [place] [phone] Your Updated Medication List  
  
   
This list is accurate as of: 8/18/17  9:04 AM.  Always use your most recent med list.  
  
  
  
  
 calcium carbonate 200 mg calcium (500 mg) Grace Lilly Commonly known as:  TUMS Take 1 Tab by mouth daily. We Performed the Following OCCULT BLOOD, IMMUNOASSAY (FIT) [id number] CPT(R)] Patient Instructions Medicare Part B Preventive Services Limitations Recommendation Scheduled Bone Mass Measurement 
(age 72 & older, biennial) Requires diagnosis related to osteoporosis or estrogen deficiency. Biennial benefit unless patient has history of long-term glucocorticoid tx or baseline is needed because initial test was by other method Last: 9/1/16 A DEXA scan is recommended after you turn 72years of age to determine your risk for osteoporosis Next: As recommended by your physician Colorectal Cancer Screening 
-Fecal occult blood test (annual) -Flexible sigmoidoscopy (5y) 
-Screening colonoscopy (10y) -Barium Enema  Last: 2010 Every 5-10 years depending on your colonoscopy result starting at age 48 years Next: Due now Glaucoma Screening (no USPSTF recommendation) Diabetes mellitus, family history, , age 48 or over,  American, age 72 or over Last: 2-3 years ago Every year Next:Due now Screening Mammography (biennial age 54-69) Annually (age 36 or over) Last: 2016 Next: Gets this done every year Screening Pap Tests and Pelvic Examination (up to age 72 and after 72 if unknown history or abnormal study last 10 years) Every 24 months except high risk Last: NA Next: 
 
Discuss with your doctor if this screening is appropriate for you. Cardiovascular Screening Blood Tests (every 5 years) Total cholesterol, HDL, Triglycerides Order as a panel if possible Last: 8/5/16 Every Year Next: 8/2017 or as recommended by your physician Diabetes Screening Tests (at least every 3 years, Medicare covers annually or at 6-month intervals for prediabetic patients) Fasting blood sugar (FBS) or glucose tolerance test (GTT) Patient must be diagnosed with one of the following: 
-Hypertension, Dyslipidemia, obesity, previous impaired FBS or GTT 
Or any two of the following: overweight, FH of diabetes, age ? 72, history of gestational diabetes, birth of baby weighing more than 9 pounds Last: 8/5/16 Diabetic: Every 3-6 months depending on your result Non-Diabetic: Every 3 years Next: Check with yearly labs Diabetes Self-Management Training (DSMT) (no USPSTF recommendation) Requires referral by treating physician for patient with diabetes or renal disease. 10 hours of initial DSMT session of no less than 30 minutes each in a continuous 12-month period. 2 hours of follow-up DSMT in subsequent years. Last: NA Talk to your doctor if you are interested in a refresher course. Medical Nutrition Therapy (MNT) (for diabetes or renal disease not recommended schedule) Requires referral by treating physician for patient with diabetes or renal disease. Can be provided in same year as diabetes self-management training (DSMT), and CMS recommends medical nutrition therapy take place after DSMT. Up to 3 hours for initial year and 2 hours in subsequent years. Last: NA Talk to your doctor if you are interested in a refresher course. Seasonal Influenza Vaccination (annually)  Last: 2016 Every Fall Please get one this Fall. Pneumococcal Vaccination (once after 65)  Last: 
Pneumovax: 
2012 Prevnar-13: 
2016 Complete Shingles Vaccination  Last: A shingles vaccine is recommended once in a lifetime after age 61 Please get one at your pharmacy. Tetanus, Diphtheria and Pertussis (Tdap) Vaccination Booster One Booster as an adult and then tetanus every 10 years or as indicated Last: 2011 Complete Hepatitis B Vaccinations (if medium/high risk) Medium/high risk factors:  End-stage renal disease, Hemophiliacs who received Factor VIII or IX concentrates, Clients of institutions for the mentally retarded, Persons who live in the same house as a HepB virus carrier, Homosexual men, Illicit injectable drug abusers. Last: NA Next: NA  
Counseling to Prevent Tobacco Use (up to 8 sessions per year) - Counseling greater than 3 and up to 10 minutes - Counseling greater than 10 minutes Patients must be asymptomatic of tobacco-related conditions to receive as preventive service Last: NA Next: NA Human Immunodeficiency Virus (HIV) Screening (annually for increased risk patients) HIV-1 and HIV-2 by EIA, TERRI, rapid antibody test, or oral mucosa transudate Patient must be at increased risk for HIV infection per USPSTF guidelines or pregnant. Tests covered annually for patients at increased risk. Pregnant patients may receive up to 3 test during pregnancy. Last: NA Next: NA Ultrasound Screening for Abdominal Aortic Aneurysm (AAA) once Patient must be referred through IPPE and not have had a screening for abdominal aortic aneurysm before under medicare. Limited to patients who meet onf of the following criteria: 
-Men who are 73-68 years old and have smoked more than 100 cigarettes in their lifetime 
-Anyone with a FH of AAA 
-Anyone recommended for screening by the USPSFTF As recommended by your PCP or Specialist 
 As recommended by your PCP or Specialist 
  
NA = Not Applicable; NI= Not Indicated 1) Please complete your stool cards and bring back to the clinic 2) Read over your advanced care planning paperwork and bring back to your next appointment. 3) Schedule your eye exam 
 
 
  
Introducing [facility]! [facility] introduces TheCommentor patient portal. Now you can access parts of your medical record, email your doctor's office, and request medication refills online. 1. In your internet browser, go to [email or web address] 2. Click on the First Time User? Click Here link in the Sign In box. You will see the New Member Sign Up page. 3. Enter your Sin[employer]a Access Code exactly as it appears below. You will not need to use this code after youve completed the sign-up process. If you do not sign up before the expiration date, you must request a new code. · [employer] Access Code: [email or web address] Expires: 9/20/2017 11:11 AM 
 
4. Enter the last four digits of your Social Security Number (xxxx) and Date of Birth (mm/dd/yyyy) as indicated and click Submit. You will be taken to the next sign-up page. 5. Create a SinCola ID. This will be your Sin[employer]a login ID and cannot be changed, so think of one that is secure and easy to remember. 6. Create a Sin[employer]a password. You can change your password at any time. 7. Enter your Password Reset Question and Answer. This can be used at a later time if you forget your password. 8. Enter your e-mail address. You will receive e-mail notification when new information is available in [address] 9. Click Sign Up. You can now view and download portions of your medical record. 10. Click the Download Summary menu link to download a portable copy of your medical information. If you have questions, please visit the Frequently Asked Questions section of the [employer] website. Remember, SinCola is NOT to be used for urgent needs. For medical emergencies, dial 911. Now available from your iPhone and Android! Please provide this summary of care documentation to your next provider. Your primary care clinician is listed as Beth Crawford. If you have any questions after today's visit, please call [phone].

## 2019-07-15 NOTE — ASSESSMENT & PLAN NOTE
Will need NH placement she is not sure if she wants this.  working with her. If not waiting on nursing home may be able tod/c in am. Will need to find where she is going. She is working on that today   PPD ordered for NH placement

## 2019-07-15 NOTE — PROGRESS NOTES
[facility]  Pulmonology  Progress Note    Patient Name: Di Deluca  MRN: [id number]  Admission Date: 7/12/2019  Hospital Length of Stay: 2 days  Code Status: Full Code  Attending Provider: Vee Tejeda MD  Primary Care Provider: Thomas Huertas MD   Principal Problem: COPD exacerbation    Subjective:     Interval History: unchanged she is denying have used narcotics PTA     Objective:     Vital Signs (Most Recent):  Temp: 97.9 °F (36.6 °C) (07/14/19 2006)  Pulse: 90 (07/14/19 2006)  Resp: 18 (07/14/19 2006)  BP: 105/61 (07/14/19 2006)  SpO2: (!) 92 % (07/14/19 2006) Vital Signs (24h Range):  Temp:  [96.8 °F (36 °C)-98.8 °F (37.1 °C)] 97.9 °F (36.6 °C)  Pulse:  [] 90  Resp:  [16-22] 18  SpO2:  [92 %-100 %] 92 %  BP: (105-132)/(59-94) 105/61     Weight: 47.7 kg (105 lb 2.6 oz)  Body mass index is 18.05 kg/m².      Intake/Output Summary (Last 24 hours) at 7/14/2019 2029  Last data filed at 7/14/2019 1800  Gross per 24 hour   Intake 1020 ml   Output --   Net 1020 ml       Physical Exam   Constitutional: She is oriented to person, place, and time. She appears well-developed and well-nourished. She is cooperative.  Non-toxic appearance. She does not appear ill. No distress.   HENT:   Head: Normocephalic and atraumatic.   Right Ear: Hearing, tympanic membrane, external ear and ear canal normal.   Left Ear: Hearing, tympanic membrane, external ear and ear canal normal.   Nose: Nose normal. No mucosal edema, rhinorrhea or nasal deformity. No epistaxis. Right sinus exhibits no maxillary sinus tenderness and no frontal sinus tenderness. Left sinus exhibits no maxillary sinus tenderness and no frontal sinus tenderness.   Mouth/Throat: Uvula is midline, oropharynx is clear and moist and mucous membranes are normal. No trismus in the jaw. Normal dentition. No uvula swelling. No posterior oropharyngeal erythema.   Eyes: Conjunctivae and lids are normal. No scleral icterus.   Sclera clear bilat    Neck: Trachea normal, full passive range of motion without pain and phonation normal. Neck supple.   Cardiovascular: Normal rate, regular rhythm, normal heart sounds, intact distal pulses and normal pulses.   Pulmonary/Chest: No accessory muscle usage. She is in respiratory distress (mild). She has decreased breath sounds in the right upper field, the right middle field, the right lower field, the left upper field, the left middle field and the left lower field. She has wheezes in the right lower field and the left lower field. She has rhonchi in the right lower field and the left lower field.   Abdominal: Soft. Normal appearance and bowel sounds are normal. She exhibits no distension. There is no tenderness.   Musculoskeletal: Normal range of motion. She exhibits no edema or deformity.   Neurological: She is alert and oriented to person, place, and time. She exhibits normal muscle tone. Coordination normal.   Skin: Skin is warm, dry and intact. She is not diaphoretic. No pallor.   Psychiatric: She has a normal mood and affect. Her speech is normal and behavior is normal. Judgment and thought content normal. Cognition and memory are normal.   Nursing note and vitals reviewed.      Vents:       Lines/Drains/Airways     Peripheral Intravenous Line                 Peripheral IV - Single Lumen 07/13/19 2100 20 G Right Wrist less than 1 day                Significant Labs:    CBC/Anemia Profile:  Recent Labs   Lab 07/13/19  0613   WBC 5.07   HGB 11.9*   HCT 36.6*      MCV 87   RDW 12.7        Chemistries:  Recent Labs   Lab 07/13/19  0613   *   K 4.2   CL 77*   CO2 41*   BUN 9   CREATININE 0.6   CALCIUM 9.7   ALBUMIN 3.2*   PROT 6.1   BILITOT 0.4   ALKPHOS 65   ALT 17   AST 21       All pertinent labs within the past 24 hours have been reviewed.    Significant Imaging:  I have reviewed all pertinent imaging results/findings within the past 24 hours.    Assessment/Plan:     * COPD exacerbation  Patient with  sob significant decreased BS    Chronic obstructive pulmonary disease  Decreased BS   quit smoking last week    Chronic respiratory failure with hypoxia and hypercapnia  Secondary to smoking and narcotics suppressing ventilatory drive     Tobacco abuse  Quit last week  Needs to stay off cigarettes           Javier Vance MD  Pulmonology  [facility]

## 2019-07-15 NOTE — PLAN OF CARE
07/15/19 1341   Discharge Reassessment   Assessment Type Discharge Planning Reassessment           Patient still on board with going to a nursing home for rehabilitation. She decided on [facility]. Please see SW previous note on status. Patient will remain for continued treatment today. No needs or concerns voiced at this time. CM will continue to follow and assist as needed.

## 2019-07-15 NOTE — PT/OT/SLP EVAL
"Occupational Therapy   Evaluation    Name: Di Deluca  MRN: [id number]  Admitting Diagnosis:  COPD exacerbation      Recommendations:     Discharge Recommendations: nursing facility, skilled, nursing facility, basic  Discharge Equipment Recommendations:  none  Barriers to discharge:  Inaccessible home environment, Decreased caregiver support    Assessment:     Di Deluca is a 68 y.o. female with a medical diagnosis of COPD exacerbation.  She presents with essentially currently she is homeless. She is  from her  who lives with his new wife in [place] near one of her daughters, the other daughter lives in [place]. 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 . Recently, she had to move out of [place] and in May of this year had some difficulty getting her mail/ checks/ medications. She paid to rent a room  "in [facility]" () from someone, then moved in with a lady friend living in a mobile home "[address] near [place]" that she had met 15  years ago when incarcerated, that person does not drive and she cannot move back there for various reasons. She fell a week ago and has a cast on her left wrist. Her car is at her friends house and she was driving until recently.  She has been to the ER several times this month for shortness of breath per chart review.  Normally, she smokes 2 packs of cigarettes per day and has been educated on smoking cessation strategies . She says she started smoking at around age 47 and is somewhat committed to trying to quit. She was also educated on the reasons not to turn up 02 levels beyond what MD recommends. (currently 2 liters)  She was educated to work with nursing / social work to find ways to afford her medications and have them delivered to her if she is unable to pick them up..  Performance deficits affecting function: weakness, impaired functional " mobilty, decreased safety awareness, impaired cardiopulmonary response to activity, impaired endurance, impaired self care skills.      Vital signs for eval were:  02 on 2 liters = 96%  HR = 84 bpm    Rehab Prognosis: Fair; patient would benefit from acute skilled OT services to address these deficits and reach maximum level of function.       Plan:     Patient to be seen   to address the above listed problems via self-care/home management, therapeutic activities, therapeutic exercises  · Plan of Care Expires: 07/31/19  · Plan of Care Reviewed with: patient    Subjective     Chief Complaint: I don't have anywhere to live  Patient/Family Comments/goals: to get better and a more stable living situation    Occupational Profile:  Living Environment: currently basically homeless  Previous level of function: ind in ADL  Roles and Routines: impaired  Equipment Used at Home:  oxygen  Assistance upon Discharge: unknown, has a poor network of social support currently    Pain/Comfort:  · Pain Rating 1: 0/10  · Pain Rating Post-Intervention 1: 0/10    Patients cultural, spiritual, Anabaptist conflicts given the current situation: no    Objective:     Communicated with: nursing prior to session.  Patient found supine with oxygen, telemetry upon OT entry to room.    General Precautions: Standard, fall   Orthopedic Precautions:N/A   Braces: (cast on left wrist s/p a fall about a week ago)     Occupational Performance:    Bed Mobility:    · Patient completed Rolling/Turning to Left with  independence  · Patient completed Rolling/Turning to Right with independence  · Patient completed Scooting/Bridging with independence  · Patient completed Supine to Sit with independence  · Patient completed Sit to Supine with independence    Functional Mobility/Transfers:  · Patient completed Sit <> Stand Transfer with independence  with  no assistive device   · Patient completed Bed <> Chair Transfer using Stand Pivot technique with independence  "with no assistive device  · Patient completed Toilet Transfer Stand Pivot technique with independence with  no AD  ·     Activities of Daily Living:  · Feeding:  independence . ate breakfast  · Grooming: independence to comb hair , wash teeth and face seated EOB< no LOB  · Bathing: NT- says she had a sponge bath yesterday and the soap was "burning her skin". Nursing offered a water only sponge bath, which she declined for today  · Upper Body Dressing: independence gown  · Lower Body Dressing: independence socks seated EOB to cross legs and reach feet, no LOB  · Toileting: supervision using [facility]    Cognitive/Visual Perceptual:  Cognitive/Psychosocial Skills:     -       Follows Commands/attention:Follows two-step commands A & 0 x 3      AMPAC 6 Click ADL:  AMPAC Total Score: 23    Treatment & Education:  eval and goals setting, education on energy conservation, smoking cessation and d/c options she can discuss with   Education:    Patient left supine with all lines intact, call button in reach and bed alarm on    GOALS:   Multidisciplinary Problems     Occupational Therapy Goals        Problem: Occupational Therapy Goal    Goal Priority Disciplines Outcome Interventions   Occupational Therapy Goal     OT, PT/OT     Description:  Goals to be met by: 7/31/2019     Patient will increase functional independence with ADLs by performing:    LE Dressing with [place].  Grooming while standing at sink with [place].  Toileting from toilet with [place] for hygiene and clothing management.   Bathing from  shower chair/bench with Modified [place].  Upper extremity exercise program x10 reps per handout, with assistance as needed with stable vital signs. 02 > 90 % on 2 liters.                      History:     Past Medical History:   Diagnosis Date    Asthma     Back problem     Calcaneal spur     Chicken pox 2015    Hypertension     Rheumatic disease     Thyroid cancer        Past Surgical " History:   Procedure Laterality Date    HYSTERECTOMY      left knee      THYROIDECTOMY      TONSILLECTOMY         Time Tracking:     OT Date of Treatment: 07/15/19  OT Start Time: 0830  OT Stop Time: 0930  OT Total Time (min): 60 min    Billable Minutes:Evaluation 15  Self Care/Home Management 45    Aidee Nettles OT  7/15/2019

## 2019-07-15 NOTE — ASSESSMENT & PLAN NOTE
IV steroids; taper from 125 to 40 mg .  7/15: prednisone 40mg PO BID  IV antibiotics x 1 more day  Nebs   Pulmonary consulted .    Improving and anticipate discharge soon. Awaiting NH placement

## 2019-07-15 NOTE — SUBJECTIVE & OBJECTIVE
Review of Systems   Constitutional: Negative for chills and fever.   HENT: Negative for congestion, hearing loss, sinus pressure and sore throat.    Eyes: Negative for photophobia.   Respiratory: Positive for wheezing. Negative for apnea, cough, choking, chest tightness and shortness of breath.    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 Negative for palpitations.   Gastrointestinal: Negative for blood in stool, nausea and vomiting.   Genitourinary: Negative for dysuria and hematuria.   Musculoskeletal: Negative for arthralgias and myalgias.        Left arm in cast    Skin: Negative for pallor.   Neurological: Negative for dizziness and numbness.   Hematological: Does not bruise/bleed easily.   Psychiatric/Behavioral: Negative for confusion and suicidal ideas. The patient is nervous/anxious.      Objective:     Vital Signs (Most Recent):  Temp: 96 °F (35.6 °C) (07/15/19 0706)  Pulse: 95 (07/15/19 0800)  Resp: 18 (07/15/19 0706)  BP: 111/61 (07/15/19 0706)  SpO2: 95 % (07/15/19 0706) Vital Signs (24h Range):  Temp:  [96 °F (35.6 °C)-98.8 °F (37.1 °C)] 96 °F (35.6 °C)  Pulse:  [] 95  Resp:  [16-22] 18  SpO2:  [92 %-97 %] 95 %  BP: (105-118)/(59-68) 111/61     Weight: 47.7 kg (105 lb 2.6 oz)  Body mass index is 18.05 kg/m².    Intake/Output Summary (Last 24 hours) at 7/15/2019 1000  Last data filed at 7/15/2019 0833  Gross per 24 hour   Intake 1260 ml   Output --   Net 1260 ml      Physical Exam   Constitutional: She is oriented to person, place, and time. She appears well-developed and well-nourished.   HENT:   Head: Normocephalic and atraumatic.   Right Ear: External ear normal.   Left Ear: External ear normal.   Nose: Nose normal.   Eyes: Pupils are equal, round, and reactive to light. Conjunctivae and EOM are normal.   Neck: Normal range of motion. Neck supple. No thyromegaly present.   Cardiovascular: Normal rate, regular rhythm,  normal heart sounds and intact distal pulses.   Pulmonary/Chest: Effort normal. No respiratory distress. She has no wheezes.   Abdominal: Soft. Bowel sounds are normal.   Musculoskeletal: Normal range of motion. She exhibits no edema.   Neurological: She is alert and oriented to person, place, and time. She has normal reflexes.   Skin: Skin is warm and dry.   Psychiatric: She has a normal mood and affect. Her behavior is normal.   Nursing note and vitals reviewed.    Lab Results   Component Value Date    WBC 14.76 (H) 07/15/2019    HGB 11.7 (L) 07/15/2019    HCT 35.0 (L) 07/15/2019    MCV 84 07/15/2019     07/15/2019     BMP  Lab Results   Component Value Date     (L) 07/15/2019    K 4.8 07/15/2019    CL 75 (L) 07/15/2019    CO2 42 (HH) 07/15/2019    BUN 16 07/15/2019    CREATININE 0.6 07/15/2019    CALCIUM 9.5 07/15/2019    ANIONGAP 7 (L) 07/15/2019    ESTGFRAFRICA >60 07/15/2019    EGFRNONAA >60 07/15/2019     Lab Results   Component Value Date    ALT 17 07/13/2019    AST 21 07/13/2019    ALKPHOS 65 07/13/2019    BILITOT 0.4 07/13/2019     Lactic acid 0.6  Lab Results   Component Value Date    DDIMER 1.15 (H) 07/12/2019     Lab Results   Component Value Date    INR 1.0 07/12/2019    INR 0.9 06/14/2019    INR 1.0 06/14/2017     BNP  Recent Labs   Lab 07/12/19  1018   *     Recent Labs   Lab 07/12/19  1018     100   CPKMB 5.1   TROPONINI 0.010   MB 5.1*     Flu negative .    Blood cultures NGTD x 2    CXR Chronic lung changes are noted bilaterally.  There is hyperlucency of the right lung likely related to underlying bullous change.  The heart is normal in size.  Calcified atheromatous disease affects the aorta.  Age-appropriate degenerative changes affect the skeleton.    EKG Baseline wander Normal sinus rhythm  Right axis deviation  T wave abnormality, consider anterior ischemia or Persistent Normal  variant, juvenile T waves  Abnormal ECG  When compared with ECG of 06-JUL-2019  01:08,  No significant change was found

## 2019-07-15 NOTE — PT/OT/SLP EVAL
"Physical Therapy Evaluation    Patient Name:  Di Deluca   MRN:  [id number]    Recommendations:     Discharge Recommendations:  nursing facility, skilled   Discharge Equipment Recommendations: none   Barriers to discharge: Inaccessible home and Decreased caregiver support    Assessment:     Di Deluca is a 68 y.o. female admitted with a medical diagnosis of COPD exacerbation.  She presents with the following impairments/functional limitations:  weakness, impaired endurance, impaired self care skills, gait instability, impaired balance, pain, decreased upper extremity function, decreased lower extremity function, impaired cardiopulmonary response to activity that are causing the pt to have decreased independence and safety with all gait and mobility tasks.  Pt requires skilled PT treatment to increase independence and safety with all gait and mobility tasks to return to previous level of function.    Rehab Prognosis: Fair; patient would benefit from acute skilled PT services to address these deficits and reach maximum level of function.    Recent Surgery: * No surgery found *      Plan:     During this hospitalization, patient to be seen 5 x/week to address the identified rehab impairments via gait training, therapeutic activities, therapeutic exercises and progress toward the following goals:    · Plan of Care Expires:  07/25/19    Subjective     Chief Complaint: L shoulder pain  Patient/Family Comments/goals: decrease paihn  Pain/Comfort:  · Pain Rating 1: 7/10  · Location - Side 1: Left  · Location - Orientation 1: generalized  · Location 1: shoulder("thoracic outlet syndrome")  · Pain Addressed 1: Reposition  · Pain Rating Post-Intervention 1: 7/10    Patients cultural, spiritual, Church conflicts given the current situation:      Living Environment:  Pt lives with a roommate in a second story apartment.  Prior to admission, patients level of function was required assistance.  Equipment used at home: " oxygen.  DME owned (not currently used): none.  Upon discharge, patient will have assistance from her roommate.    Objective:     Communicated with pt prior to session.  Patient found supine with oxygen, telemetry  upon PT entry to room.    General Precautions: Standard, fall   Orthopedic Precautions:N/A   Braces: UE brace     Exams:  · Cognitive Exam:  Patient is oriented to Person, Place and Time  · Fine Motor Coordination:    · -       Intact  · Gross Motor Coordination:  WFL  · Postural Exam:  Patient presented with the following abnormalities:    · -       Rounded shoulders  · -       Forward head  · Sensation:    · -       Intact  light/touch B feet  · Skin Integrity/Edema:      · -       Skin integrity: Visible skin intact  · -       Edema: None noted BLEs  · RLE ROM: WFL  · RLE Strength: Deficits: 4/5 to knee and hip  · LLE ROM: WFL  · LLE Strength: Deficits: 4/5 to knee and hip    Functional Mobility:  · Bed Mobility:     · Rolling Left:  modified independence  · Rolling Right: modified independence  · Scooting: independence  · Supine to Sit: independence  · Sit to Supine: independence  · Transfers:     · Sit to Stand:  stand by assistance with no AD  · Bed to Chair: contact guard assistance with  no AD  using  Step Transfer  · Gait: CGA 10' w/ no AD    AM-PAC 6 CLICK MOBILITY  Total Score:20     Patient left supine with all lines intact and call button in reach.    GOALS:   Multidisciplinary Problems     Physical Therapy Goals        Problem: Physical Therapy Goal    Goal Priority Disciplines Outcome Goal Variances Interventions   Physical Therapy Goal     PT, PT/OT Ongoing (interventions implemented as appropriate)     Description:  Goals to be met by: 19     Patient will increase functional independence with mobility by performin. Sit to stand transfer with [place]  2. Bed to chair transfer with [place]  3. Gait  x 100 feet with Supervision.   4. Ascend/descend 17 stair with  bilateral Handrails Stand-by Assistance.   5. Lower extremity exercise program x10 reps per handout, with independence                      History:     Past Medical History:   Diagnosis Date    Asthma     Back problem     Calcaneal spur     Chicken pox 2015    Hypertension     Rheumatic disease     Thyroid cancer        Past Surgical History:   Procedure Laterality Date    HYSTERECTOMY      left knee      THYROIDECTOMY      TONSILLECTOMY         Time Tracking:     PT Received On: 07/15/19  PT Start Time: 1025     PT Stop Time: 1035  PT Total Time (min): 10 min     Billable Minutes: Evaluation 10      Kaleb Lora, PT  07/15/2019

## 2019-07-15 NOTE — PROGRESS NOTES
[facility]  Pulmonology  Progress Note    Patient Name: Di Deluca  MRN: [id number]  Admission Date: 7/12/2019  Hospital Length of Stay: 3 days  Code Status: Full Code  Attending Provider: Vee Tejeda MD  Primary Care Provider: Thomas Huertas MD   Principal Problem: COPD exacerbation    Subjective:     Interval History: up in bed to go to NH    Objective:     Vital Signs (Most Recent):  Temp: 96.5 °F (35.8 °C) (07/15/19 0416)  Pulse: 85 (07/15/19 0700)  Resp: 16 (07/15/19 0700)  BP: 115/68 (07/15/19 0416)  SpO2: 95 % (07/15/19 0700) Vital Signs (24h Range):  Temp:  [96.5 °F (35.8 °C)-98.8 °F (37.1 °C)] 96.5 °F (35.8 °C)  Pulse:  [] 85  Resp:  [16-22] 16  SpO2:  [92 %-97 %] 95 %  BP: (105-118)/(59-68) 115/68     Weight: 47.7 kg (105 lb 2.6 oz)  Body mass index is 18.05 kg/m².      Intake/Output Summary (Last 24 hours) at 7/15/2019 0719  Last data filed at 7/14/2019 1800  Gross per 24 hour   Intake 1020 ml   Output --   Net 1020 ml       Physical Exam   Constitutional: She is oriented to person, place, and time. She appears well-developed and well-nourished. She is cooperative.  Non-toxic appearance. She does not appear ill. No distress.   HENT:   Head: Normocephalic and atraumatic.   Right Ear: Hearing, tympanic membrane, external ear and ear canal normal.   Left Ear: Hearing, tympanic membrane, external ear and ear canal normal.   Nose: Nose normal. No mucosal edema, rhinorrhea or nasal deformity. No epistaxis. Right sinus exhibits no maxillary sinus tenderness and no frontal sinus tenderness. Left sinus exhibits no maxillary sinus tenderness and no frontal sinus tenderness.   Mouth/Throat: Uvula is midline, oropharynx is clear and moist and mucous membranes are normal. No trismus in the jaw. Normal dentition. No uvula swelling. No posterior oropharyngeal erythema.   Eyes: Conjunctivae and lids are normal. No scleral icterus.   Sclera clear bilat   Neck: Trachea normal, full passive  range of motion without pain and phonation normal. Neck supple.   Cardiovascular: Normal rate, regular rhythm, S1 normal, S2 normal, normal heart sounds, intact distal pulses and normal pulses.   No murmur heard.  Pulmonary/Chest: She is in respiratory distress (mild to moderate). She has decreased breath sounds in the right middle field, the right lower field, the left middle field and the left lower field. She has no wheezes. She has no rhonchi. She has no rales.   Abdominal: Soft. Normal appearance and bowel sounds are normal. She exhibits no distension. There is no tenderness.   Musculoskeletal: Normal range of motion. She exhibits no edema or deformity.   Neurological: She is alert and oriented to person, place, and time. She exhibits normal muscle tone. Coordination normal.   Skin: Skin is warm, dry and intact. She is not diaphoretic. No pallor.   Psychiatric: She has a normal mood and affect. Her speech is normal and behavior is normal. Judgment and thought content normal. Cognition and memory are normal.   Nursing note and vitals reviewed.      Vents:       Lines/Drains/Airways     Peripheral Intravenous Line                 Peripheral IV - Single Lumen 07/13/19 2100 20 G Right Wrist 1 day                Significant Labs:    CBC/Anemia Profile:  Recent Labs   Lab 07/15/19  0430   WBC 14.76*   HGB 11.7*   HCT 35.0*      MCV 84   RDW 12.8        Chemistries:  Recent Labs   Lab 07/15/19  0430   *   K 4.8   CL 75*   CO2 42*   BUN 16   CREATININE 0.6   CALCIUM 9.5       All pertinent labs within the past 24 hours have been reviewed.    Significant Imaging:  I have reviewed all pertinent imaging results/findings within the past 24 hours.    Assessment/Plan:     * COPD exacerbation  Patient with sob significant decreased BS    Acute respiratory failure with hypoxia and hypercarbia  pco2 is elevated Now a co2 retainer severe ?? narcotics    Chronic narcotic use  Needs to stop    Chronic obstructive  pulmonary disease  Decreased BS   quit smoking last week    Chronic respiratory failure with hypoxia and hypercapnia  Secondary to smoking and narcotics suppressing ventilatory drive            Javier Vance MD  Pulmonology  [facility]

## 2019-07-15 NOTE — PLAN OF CARE
Problem: Adult Inpatient Plan of Care  Goal: Plan of Care Review  Outcome: Ongoing (interventions implemented as appropriate)  Patient admitted with COPD exacerbation. Remains on IV rocephin and zithromax. Solumedrol changed to PO prednisone. Oxygen sats mid 90s on 2L via nasal cannula. Complains of SOB and HI. Breath sounds diminished. Afebrile. Blood cultures NTD. WBC elevated today (pt was on solumedrol). Sinus rhythm on telemetry. Vitals stable. PT consulted, up with assist to BSC..  Left arm in cast from previous fx; neuro checks wnl. Ddimer elevated on admit; CTA negative for PE. Call bell within reach. Bed in low, locked position. Reviewed plan of care with pt; states agreement.

## 2019-07-15 NOTE — ASSESSMENT & PLAN NOTE
Continue nebs  Checked ABG    BIPAP-- she is not using  Cont O2 via NC, on 2L and prescribed up to 3L for home use

## 2019-07-15 NOTE — PLAN OF CARE
07/15/19 0754   Medicare Message   Important Message from Medicare regarding Discharge Appeal Rights Given to patient/caregiver;Explained to patient/caregiver;Signed/date by patient/caregiver   Date IMM was signed 07/12/19       No time recorded on admit IMM.

## 2019-07-15 NOTE — PLAN OF CARE
07/15/19 1207   Post-Acute Status   Post-Acute Authorization Placement   Post-Acute Placement Status Referrals Sent     Nursing home placement at [facility] initiated for patient with patient agreeance. Ms. Deluca recognizes that she cannot care for herself and would like to be placed in a nursing home. Ms. Deluca was provided with a list of nursing homes in the area and decided upon [facility] as her first choice. Ms. Deluca would like to stay in [place], [place]. [facility] received referral through [facility] ([facility]). Ciara in admissions at [facility] confirmed receipt of referral. PASRR completed and faxed to the [facility]. BELEN called in. Awaiting [phone] for nursing home placement.     Mee Morton LMSW

## 2019-07-15 NOTE — PLAN OF CARE
07/15/19 1205   Readmission Questionnaire   At the time of your discharge, did someone talk to you about what your health problems were? Yes   At the time of discharge, did someone talk to you about what to watch out for regarding worsening of your health problem? No   At the time of discharge, did someone talk to you about what to do if you experienced worsening of your health problem? Yes   At the time of discharge, did someone talk to you about which medication to take when you left the hospital and which ones to stop taking? Yes   At the time of discharge, did someone talk to you about when and where to follow up with a doctor after you left the hospital? Yes   What do you believe caused you to be sick enough to be re-admitted? Out of medication; unable to drive; out of money   How often do you need to have someone help you when you read instructions, pamphlets, or other written material from your doctor or pharmacy? Never   Do you have problems taking your medications as prescribed? Yes   Do you have any problems affording any of  your prescribed medications? Yes   Do you have problems obtaining/receiving your medications? Yes   Does the patient have transportation to healthcare appointments? Yes   Lives With other (see comments)  (Homeless)   Living Arrangements other (see comments)  (House to house with friends.)   Does the patient have family/friends to help with healtcare needs after discharge? no   If no, what kind of help do you need at home? Normally doesn't need assistance but needs assistance with many tasks now that she broke her arm.    Are you currently feeling confused? Yes   Are you currently having problems thinking? Yes  (Due to pain. )   Are you currently having memory problems? No   Have you felt down, depressed, or hopeless? 0   Have you felt little interest or pleasure in doing things? 0   In the last 7 days, my sleep quality was: poor

## 2019-07-15 NOTE — SUBJECTIVE & OBJECTIVE
Interval History: up in bed to go to NH    Objective:     Vital Signs (Most Recent):  Temp: 96.5 °F (35.8 °C) (07/15/19 0416)  Pulse: 85 (07/15/19 0700)  Resp: 16 (07/15/19 0700)  BP: 115/68 (07/15/19 0416)  SpO2: 95 % (07/15/19 0700) Vital Signs (24h Range):  Temp:  [96.5 °F (35.8 °C)-98.8 °F (37.1 °C)] 96.5 °F (35.8 °C)  Pulse:  [] 85  Resp:  [16-22] 16  SpO2:  [92 %-97 %] 95 %  BP: (105-118)/(59-68) 115/68     Weight: 47.7 kg (105 lb 2.6 oz)  Body mass index is 18.05 kg/m².      Intake/Output Summary (Last 24 hours) at 7/15/2019 0719  Last data filed at 7/14/2019 1800  Gross per 24 hour   Intake 1020 ml   Output --   Net 1020 ml       Physical Exam   Constitutional: She is oriented to person, place, and time. She appears well-developed and well-nourished. She is cooperative.  Non-toxic appearance. She does not appear ill. No distress.   HENT:   Head: Normocephalic and atraumatic.   Right Ear: Hearing, tympanic membrane, external ear and ear canal normal.   Left Ear: Hearing, tympanic membrane, external ear and ear canal normal.   Nose: Nose normal. No mucosal edema, rhinorrhea or nasal deformity. No epistaxis. Right sinus exhibits no maxillary sinus tenderness and no frontal sinus tenderness. Left sinus exhibits no maxillary sinus tenderness and no frontal sinus tenderness.   Mouth/Throat: Uvula is midline, oropharynx is clear and moist and mucous membranes are normal. No trismus in the jaw. Normal dentition. No uvula swelling. No posterior oropharyngeal erythema.   Eyes: Conjunctivae and lids are normal. No scleral icterus.   Sclera clear bilat   Neck: Trachea normal, full passive range of motion without pain and phonation normal. Neck supple.   Cardiovascular: Normal rate, regular rhythm, S1 normal, S2 normal, normal heart sounds, intact distal pulses and normal pulses.   No murmur heard.  Pulmonary/Chest: She is in respiratory distress (mild to moderate). She has decreased breath sounds in the right  middle field, the right lower field, the left middle field and the left lower field. She has no wheezes. She has no rhonchi. She has no rales.   Abdominal: Soft. Normal appearance and bowel sounds are normal. She exhibits no distension. There is no tenderness.   Musculoskeletal: Normal range of motion. She exhibits no edema or deformity.   Neurological: She is alert and oriented to person, place, and time. She exhibits normal muscle tone. Coordination normal.   Skin: Skin is warm, dry and intact. She is not diaphoretic. No pallor.   Psychiatric: She has a normal mood and affect. Her speech is normal and behavior is normal. Judgment and thought content normal. Cognition and memory are normal.   Nursing note and vitals reviewed.      Vents:       Lines/Drains/Airways     Peripheral Intravenous Line                 Peripheral IV - Single Lumen 07/13/19 2100 20 G Right Wrist 1 day                Significant Labs:    CBC/Anemia Profile:  Recent Labs   Lab 07/15/19  0430   WBC 14.76*   HGB 11.7*   HCT 35.0*      MCV 84   RDW 12.8        Chemistries:  Recent Labs   Lab 07/15/19  0430   *   K 4.8   CL 75*   CO2 42*   BUN 16   CREATININE 0.6   CALCIUM 9.5       All pertinent labs within the past 24 hours have been reviewed.    Significant Imaging:  I have reviewed all pertinent imaging results/findings within the past 24 hours.

## 2019-07-15 NOTE — PROGRESS NOTES
Ms. Deluca is well aware of all meds being given, what they are for, how to take, potential side effects and fall precautions. She reports pain at an 8/10 on the pain scale but she has recently been given medication. She requests a snack and vaseline for her nose in which the nurse was notified. She has no further questions or concerns about her medications at this time.     Huma Harp, PharmD  [phone]

## 2019-07-15 NOTE — PROGRESS NOTES
"[facility] Medicine  Progress Note    Patient Name: Di Deluca  MRN: [id number]  Patient Class: IP- Inpatient   Admission Date: 7/12/2019  Length of Stay: 3 days  Attending Physician: Vee Tejeda MD  Primary Care Provider: Thomas Huertas MD        Subjective:     Principal Problem:COPD exacerbation      HPI:    Di Deluca is a 68 y.o. female with PMH of severe COPD /on  Home oxygen ,   LBP, spur, chickenpox, HTN, rheumatic disease, thyroid disease parented to ER with shortness of breath.  She used to live in [place] ; moved to her friend here " got kicked out of house "  Friend reports pain med issues . Multiple ER visit >  Seems home less ; has two living daughters :[place]. Recently fell broke left arm  Seen Dr damien armendariz ; went to er for pain meds and COPD   Patient has shortness of breath, longstanding history of COPD reported this p.m.  This is not new to us, this patient has been to the ER several times in last 2 months  She had  active wheezing on ER presentation this morning  Patient is on 4 liters of oxygen at home still smokes at least 2 packs a day for years.    Placed in observation for COPD exacerbation .  She will need NH placement ; no place to live ; multiple ER visits :  She was told for NH placement 3 months ago;she declined       Overview/Hospital Course:  7/13/19  Looks better ;keeps asking for pain meds  Again long discussion .  Shortness of breath better   Wheezing better   BIPAP ordered.  She is on rocephin , zithromax,     7/14/19  Breathing wise much better   Will need NH placement ; home less   Will taper steroids  Continue nebs and antibiotics     7/15/19  She is on day 4 azithromycin and rocephin. She has reduced medrol 40mg IV every 8hr. Using O2 (has 3L at home) here on 2L pox 95%. No fever. WBC 76193. Still smoker. nicotine patch ordered    Asking about her Fioricet for chronic pain as this is what she is using at home long term    She " also reports that she has left short arm cast due to fall. Refused PT yesterday. Had it placed 1 week ago.     Review of Systems   Constitutional: Negative for chills and fever.   HENT: Negative for congestion, hearing loss, sinus pressure and sore throat.    Eyes: Negative for photophobia.   Respiratory: Positive for wheezing. Negative for apnea, cough, choking, chest tightness and shortness of breath.    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 Negative for palpitations.   Gastrointestinal: Negative for blood in stool, nausea and vomiting.   Genitourinary: Negative for dysuria and hematuria.   Musculoskeletal: Negative for arthralgias and myalgias.        Left arm in cast    Skin: Negative for pallor.   Neurological: Negative for dizziness and numbness.   Hematological: Does not bruise/bleed easily.   Psychiatric/Behavioral: Negative for confusion and suicidal ideas. The patient is nervous/anxious.      Objective:     Vital Signs (Most Recent):  Temp: 96 °F (35.6 °C) (07/15/19 0706)  Pulse: 95 (07/15/19 0800)  Resp: 18 (07/15/19 0706)  BP: 111/61 (07/15/19 0706)  SpO2: 95 % (07/15/19 0706) Vital Signs (24h Range):  Temp:  [96 °F (35.6 °C)-98.8 °F (37.1 °C)] 96 °F (35.6 °C)  Pulse:  [] 95  Resp:  [16-22] 18  SpO2:  [92 %-97 %] 95 %  BP: (105-118)/(59-68) 111/61     Weight: 47.7 kg (105 lb 2.6 oz)  Body mass index is 18.05 kg/m².    Intake/Output Summary (Last 24 hours) at 7/15/2019 1000  Last data filed at 7/15/2019 0833  Gross per 24 hour   Intake 1260 ml   Output --   Net 1260 ml      Physical Exam   Constitutional: She is oriented to person, place, and time. She appears well-developed and well-nourished.   HENT:   Head: Normocephalic and atraumatic.   Right Ear: External ear normal.   Left Ear: External ear normal.   Nose: Nose normal.   Eyes: Pupils are equal, round, and reactive to light. Conjunctivae and EOM are normal.   Neck:  Normal range of motion. Neck supple. No thyromegaly present.   Cardiovascular: Normal rate, regular rhythm, normal heart sounds and intact distal pulses.   Pulmonary/Chest: Effort normal. No respiratory distress. She has no wheezes.   Abdominal: Soft. Bowel sounds are normal.   Musculoskeletal: Normal range of motion. She exhibits no edema.   Neurological: She is alert and oriented to person, place, and time. She has normal reflexes.   Skin: Skin is warm and dry.   Psychiatric: She has a normal mood and affect. Her behavior is normal.   Nursing note and vitals reviewed.    Lab Results   Component Value Date    WBC 14.76 (H) 07/15/2019    HGB 11.7 (L) 07/15/2019    HCT 35.0 (L) 07/15/2019    MCV 84 07/15/2019     07/15/2019     BMP  Lab Results   Component Value Date     (L) 07/15/2019    K 4.8 07/15/2019    CL 75 (L) 07/15/2019    CO2 42 (HH) 07/15/2019    BUN 16 07/15/2019    CREATININE 0.6 07/15/2019    CALCIUM 9.5 07/15/2019    ANIONGAP 7 (L) 07/15/2019    ESTGFRAFRICA >60 07/15/2019    EGFRNONAA >60 07/15/2019     Lab Results   Component Value Date    ALT 17 07/13/2019    AST 21 07/13/2019    ALKPHOS 65 07/13/2019    BILITOT 0.4 07/13/2019     Lactic acid 0.6  Lab Results   Component Value Date    DDIMER 1.15 (H) 07/12/2019     Lab Results   Component Value Date    INR 1.0 07/12/2019    INR 0.9 06/14/2019    INR 1.0 06/14/2017     BNP  Recent Labs   Lab 07/12/19  1018   *     Recent Labs   Lab 07/12/19  1018     100   CPKMB 5.1   TROPONINI 0.010   MB 5.1*     Flu negative .    Blood cultures NGTD x 2    CXR Chronic lung changes are noted bilaterally.  There is hyperlucency of the right lung likely related to underlying bullous change.  The heart is normal in size.  Calcified atheromatous disease affects the aorta.  Age-appropriate degenerative changes affect the skeleton.    EKG Baseline wander Normal sinus rhythm  Right axis deviation  T wave abnormality, consider anterior ischemia  or Persistent Normal  variant, juvenile T waves  Abnormal ECG  When compared with ECG of 06-JUL-2019 01:08,  No significant change was found      Assessment/Plan:      * COPD exacerbation  IV steroids; taper from 125 to 40 mg .  7/15: prednisone 40mg PO BID  IV antibiotics x 1 more day  Nebs   Pulmonary consulted .    Improving and anticipate discharge soon. Awaiting NH placement      Acute respiratory failure with hypoxia and hypercarbia        Homeless single person  Will need NH placement she is not sure if she wants this.  working with her. If not waiting on nursing home may be able tod/c in am. Will need to find where she is going. She is working on that today   PPD ordered for NH placement     Chronic narcotic use  Avoid narcotics.  tizinidine ordered   Fioricet ordered     Closed nondisplaced fracture of styloid process of left ulna with routine healing  S/p cast   .PT\  F/u oupt    Closed Colles' fracture of left radius with routine healing  S/p cast by orthopedics.  Will f/u oupt  PT        Chronic obstructive pulmonary disease  Nebs  For now   Needs to stop smoking .      Chronic respiratory failure with hypoxia and hypercapnia  Continue nebs  Checked ABG    BIPAP-- she is not using  Cont O2 via NC, on 2L and prescribed up to 3L for home use          Tobacco abuse  Nicotine patch .  Counseled again       Essential hypertension  Continue amlodipine   bp 111/61    Hyponatremia  Mos likely due to her pulmonary issues  BMP  Lab Results   Component Value Date     (L) 07/15/2019    K 4.8 07/15/2019    CL 75 (L) 07/15/2019    CO2 42 (HH) 07/15/2019    BUN 16 07/15/2019    CREATININE 0.6 07/15/2019    CALCIUM 9.5 07/15/2019    ANIONGAP 7 (L) 07/15/2019    ESTGFRAFRICA >60 07/15/2019    EGFRNONAA >60 07/15/2019       Will give 1LNS today- repeat tomorrow      VTE Risk Mitigation (From admission, onward)        Ordered     IP VTE HIGH RISK PATIENT  Once      07/12/19 1224     Place sequential  compression device  Until discontinued      07/12/19 8474                Claudia Higginbotham MD  Department of Hospital Medicine   [facility]

## 2019-07-15 NOTE — ASSESSMENT & PLAN NOTE
Mos likely due to her pulmonary issues  BMP  Lab Results   Component Value Date     (L) 07/15/2019    K 4.8 07/15/2019    CL 75 (L) 07/15/2019    CO2 42 (HH) 07/15/2019    BUN 16 07/15/2019    CREATININE 0.6 07/15/2019    CALCIUM 9.5 07/15/2019    ANIONGAP 7 (L) 07/15/2019    ESTGFRAFRICA >60 07/15/2019    EGFRNONAA >60 07/15/2019       Will give 1LNS today- repeat tomorrow

## 2019-07-15 NOTE — SUBJECTIVE & OBJECTIVE
Interval History: unchanged she is denying have used narcotics PTA     Objective:     Vital Signs (Most Recent):  Temp: 97.9 °F (36.6 °C) (07/14/19 2006)  Pulse: 90 (07/14/19 2006)  Resp: 18 (07/14/19 2006)  BP: 105/61 (07/14/19 2006)  SpO2: (!) 92 % (07/14/19 2006) Vital Signs (24h Range):  Temp:  [96.8 °F (36 °C)-98.8 °F (37.1 °C)] 97.9 °F (36.6 °C)  Pulse:  [] 90  Resp:  [16-22] 18  SpO2:  [92 %-100 %] 92 %  BP: (105-132)/(59-94) 105/61     Weight: 47.7 kg (105 lb 2.6 oz)  Body mass index is 18.05 kg/m².      Intake/Output Summary (Last 24 hours) at 7/14/2019 2029  Last data filed at 7/14/2019 1800  Gross per 24 hour   Intake 1020 ml   Output --   Net 1020 ml       Physical Exam   Constitutional: She is oriented to person, place, and time. She appears well-developed and well-nourished. She is cooperative.  Non-toxic appearance. She does not appear ill. No distress.   HENT:   Head: Normocephalic and atraumatic.   Right Ear: Hearing, tympanic membrane, external ear and ear canal normal.   Left Ear: Hearing, tympanic membrane, external ear and ear canal normal.   Nose: Nose normal. No mucosal edema, rhinorrhea or nasal deformity. No epistaxis. Right sinus exhibits no maxillary sinus tenderness and no frontal sinus tenderness. Left sinus exhibits no maxillary sinus tenderness and no frontal sinus tenderness.   Mouth/Throat: Uvula is midline, oropharynx is clear and moist and mucous membranes are normal. No trismus in the jaw. Normal dentition. No uvula swelling. No posterior oropharyngeal erythema.   Eyes: Conjunctivae and lids are normal. No scleral icterus.   Sclera clear bilat   Neck: Trachea normal, full passive range of motion without pain and phonation normal. Neck supple.   Cardiovascular: Normal rate, regular rhythm, normal heart sounds, intact distal pulses and normal pulses.   Pulmonary/Chest: No accessory muscle usage. She is in respiratory distress (mild). She has decreased breath sounds in the  right upper field, the right middle field, the right lower field, the left upper field, the left middle field and the left lower field. She has wheezes in the right lower field and the left lower field. She has rhonchi in the right lower field and the left lower field.   Abdominal: Soft. Normal appearance and bowel sounds are normal. She exhibits no distension. There is no tenderness.   Musculoskeletal: Normal range of motion. She exhibits no edema or deformity.   Neurological: She is alert and oriented to person, place, and time. She exhibits normal muscle tone. Coordination normal.   Skin: Skin is warm, dry and intact. She is not diaphoretic. No pallor.   Psychiatric: She has a normal mood and affect. Her speech is normal and behavior is normal. Judgment and thought content normal. Cognition and memory are normal.   Nursing note and vitals reviewed.      Vents:       Lines/Drains/Airways     Peripheral Intravenous Line                 Peripheral IV - Single Lumen 07/13/19 2100 20 G Right Wrist less than 1 day                Significant Labs:    CBC/Anemia Profile:  Recent Labs   Lab 07/13/19  0613   WBC 5.07   HGB 11.9*   HCT 36.6*      MCV 87   RDW 12.7        Chemistries:  Recent Labs   Lab 07/13/19  0613   *   K 4.2   CL 77*   CO2 41*   BUN 9   CREATININE 0.6   CALCIUM 9.7   ALBUMIN 3.2*   PROT 6.1   BILITOT 0.4   ALKPHOS 65   ALT 17   AST 21       All pertinent labs within the past 24 hours have been reviewed.    Significant Imaging:  I have reviewed all pertinent imaging results/findings within the past 24 hours.

## 2019-07-16 NOTE — PLAN OF CARE
07/16/19 1530   Discharge Reassessment   Assessment Type Discharge Planning Reassessment         Patient medically accepted to [facility], awaiting approval from [facility]. Patient is requiring a level 2 screen with the [facility] for approval. Once approval from [facility] is received, patient will discharge to [facility]. Patient will remain for continued treatment today. No needs or concerns voiced at this time. CM will continue to follow and assist as needed.

## 2019-07-16 NOTE — SUBJECTIVE & OBJECTIVE
Interval History: feels better is going to a NH    Objective:     Vital Signs (Most Recent):  Temp: 97.5 °F (36.4 °C) (07/16/19 0717)  Pulse: 94 (07/16/19 0805)  Resp: 17 (07/16/19 0717)  BP: 111/60 (07/16/19 0717)  SpO2: 98 % (07/16/19 0717) Vital Signs (24h Range):  Temp:  [97 °F (36.1 °C)-98.2 °F (36.8 °C)] 97.5 °F (36.4 °C)  Pulse:  [] 94  Resp:  [16-22] 17  SpO2:  [94 %-99 %] 98 %  BP: ()/(57-67) 111/60     Weight: 47.7 kg (105 lb 2.6 oz)  Body mass index is 18.05 kg/m².      Intake/Output Summary (Last 24 hours) at 7/16/2019 0907  Last data filed at 7/15/2019 1735  Gross per 24 hour   Intake 480 ml   Output --   Net 480 ml       Physical Exam   Constitutional: She is oriented to person, place, and time. She appears well-developed and well-nourished. She is cooperative.  Non-toxic appearance. She does not appear ill. No distress.   HENT:   Head: Normocephalic and atraumatic.   Right Ear: Hearing, tympanic membrane, external ear and ear canal normal.   Left Ear: Hearing, tympanic membrane, external ear and ear canal normal.   Nose: Nose normal. No mucosal edema, rhinorrhea or nasal deformity. No epistaxis. Right sinus exhibits no maxillary sinus tenderness and no frontal sinus tenderness. Left sinus exhibits no maxillary sinus tenderness and no frontal sinus tenderness.   Mouth/Throat: Uvula is midline, oropharynx is clear and moist and mucous membranes are normal. No trismus in the jaw. Normal dentition. No uvula swelling. No posterior oropharyngeal erythema.   Eyes: Conjunctivae and lids are normal. No scleral icterus.   Sclera clear bilat   Neck: Trachea normal, full passive range of motion without pain and phonation normal. Neck supple.   Cardiovascular: Normal rate, regular rhythm, normal heart sounds, intact distal pulses and normal pulses.   Pulmonary/Chest: She is in respiratory distress (mild to moderate). She has decreased breath sounds in the right middle field, the right lower field, the  left middle field and the left lower field. She has wheezes in the right lower field and the left lower field. She has rhonchi in the right lower field and the left lower field.   Abdominal: Soft. Normal appearance and bowel sounds are normal. She exhibits no distension. There is no tenderness.   Musculoskeletal: Normal range of motion. She exhibits no edema or deformity.   Neurological: She is alert and oriented to person, place, and time. She exhibits normal muscle tone. Coordination normal.   Skin: Skin is warm, dry and intact. She is not diaphoretic. No pallor.   Psychiatric: She has a normal mood and affect. Her speech is normal and behavior is normal. Judgment and thought content normal. Cognition and memory are normal.   Nursing note and vitals reviewed.      Vents:       Lines/Drains/Airways     Peripheral Intravenous Line                 Peripheral IV - Single Lumen 07/13/19 2100 20 G Right Wrist 2 days         Peripheral IV - Single Lumen 07/15/19 1300 20 G Right Upper Arm less than 1 day                Significant Labs:    CBC/Anemia Profile:  Recent Labs   Lab 07/15/19  0430 07/16/19  0648   WBC 14.76* 11.31   HGB 11.7* 11.5*   HCT 35.0* 35.0*    221   MCV 84 85   RDW 12.8 13.0        Chemistries:  Recent Labs   Lab 07/15/19  0430 07/16/19  0648   * 128*   K 4.8 4.7   CL 75* 83*   CO2 42* 37*   BUN 16 14   CREATININE 0.6 0.6   CALCIUM 9.5 9.1       All pertinent labs within the past 24 hours have been reviewed.    Significant Imaging:  I have reviewed all pertinent imaging results/findings within the past 24 hours.

## 2019-07-16 NOTE — PLAN OF CARE
Problem: Adult Inpatient Plan of Care  Goal: Plan of Care Review  Outcome: Ongoing (interventions implemented as appropriate)  Assessment complete per flow sheet, sitting up in bed,2 L NC in use, CHEY lung sounds clear, continuous cardiac monitoring intact,  multiple request, attention seeking behavior noted, hard cast with bruising to LT hand and arm noted,  arm board applied to RT arm due to patient repeated bending of arm and IV fluids beeping, tolerating well, up to BSC with assist,  side rails up x 2, call bell in reach, instructed to call for needs, voiced understanding, will monitor.   Rested well through out the night, NAD noted

## 2019-07-16 NOTE — PLAN OF CARE
Problem: Adult Inpatient Plan of Care  Goal: Plan of Care Review  Outcome: Ongoing (interventions implemented as appropriate)  Patient aware of plan of care. VS stable. Afebrile. NS with inverted t wave on tele. 2L O2 per NC in use, tolerating well. Breathing treatments. Accepted at [facility], awaiting state approval . Free from falls/injuries. No questions or concerns at this time. Agrees with plan of care.

## 2019-07-16 NOTE — SUBJECTIVE & OBJECTIVE
Review of Systems   Constitutional: Negative for chills and fever.   HENT: Negative for congestion, hearing loss, sinus pressure and sore throat.    Eyes: Negative for photophobia.   Respiratory: Negative for apnea, cough, choking, chest tightness, shortness of breath and wheezing.    Cardiovascular: Negative for chest pain and palpitations.   Gastrointestinal: Negative for blood in stool, nausea and vomiting.   Genitourinary: Negative for dysuria and hematuria.   Musculoskeletal: Negative for arthralgias and myalgias.        Left arm in cast   Chronic pain issues    Skin: Negative for pallor.   Neurological: Negative for dizziness and numbness.   Hematological: Does not bruise/bleed easily.   Psychiatric/Behavioral: Negative for confusion and suicidal ideas. The patient is not nervous/anxious.      Objective:     Vital Signs (Most Recent):  Temp: 97.5 °F (36.4 °C) (07/16/19 0717)  Pulse: 86 (07/16/19 0717)  Resp: 17 (07/16/19 0717)  BP: 111/60 (07/16/19 0717)  SpO2: 98 % (07/16/19 0717) Vital Signs (24h Range):  Temp:  [97 °F (36.1 °C)-98.2 °F (36.8 °C)] 97.5 °F (36.4 °C)  Pulse:  [] 86  Resp:  [16-22] 17  SpO2:  [94 %-99 %] 98 %  BP: ()/(57-67) 111/60     Weight: 47.7 kg (105 lb 2.6 oz)  Body mass index is 18.05 kg/m².    Intake/Output Summary (Last 24 hours) at 7/16/2019 0749  Last data filed at 7/15/2019 1735  Gross per 24 hour   Intake 960 ml   Output --   Net 960 ml      Physical Exam   Constitutional: She is oriented to person, place, and time. She appears well-developed and well-nourished.   HENT:   Head: Normocephalic and atraumatic.   Right Ear: External ear normal.   Left Ear: External ear normal.   Nose: Nose normal.   Mouth/Throat: Oropharynx is clear and moist.   Eyes: Pupils are equal, round, and reactive to light. Conjunctivae and EOM are normal.   Neck: Normal range of motion. Neck supple. No thyromegaly present.   Cardiovascular: Normal rate, regular rhythm, normal heart sounds and  intact distal pulses.   Pulmonary/Chest: Effort normal. No respiratory distress. She has no wheezes.   Abdominal: Soft. Bowel sounds are normal.   Musculoskeletal: Normal range of motion. She exhibits no edema.   Left arm in cast    Neurological: She is alert and oriented to person, place, and time. She has normal reflexes.   Skin: Skin is warm and dry.   Psychiatric: She has a normal mood and affect. Her behavior is normal. Judgment and thought content normal.   Nursing note and vitals reviewed.    Lab Results   Component Value Date    WBC 11.31 07/16/2019    HGB 11.5 (L) 07/16/2019    HCT 35.0 (L) 07/16/2019    MCV 85 07/16/2019     07/16/2019     BMP  Lab Results   Component Value Date     (L) 07/16/2019    K 4.7 07/16/2019    CL 83 (L) 07/16/2019    CO2 37 (H) 07/16/2019    BUN 14 07/16/2019    CREATININE 0.6 07/16/2019    CALCIUM 9.1 07/16/2019    ANIONGAP 8 07/16/2019    ESTGFRAFRICA >60 07/16/2019    EGFRNONAA >60 07/16/2019     Lab Results   Component Value Date    ALT 17 07/13/2019    AST 21 07/13/2019    ALKPHOS 65 07/13/2019    BILITOT 0.4 07/13/2019     Lactic acid 0.6  Lab Results   Component Value Date    DDIMER 1.15 (H) 07/12/2019     Lab Results   Component Value Date    INR 1.0 07/12/2019    INR 0.9 06/14/2019    INR 1.0 06/14/2017     BNP  Recent Labs   Lab 07/12/19  1018   *     Recent Labs   Lab 07/12/19  1018     100   CPKMB 5.1   TROPONINI 0.010   MB 5.1*     Flu negative .    Blood cultures NGTD x 2    CXR Chronic lung changes are noted bilaterally.  There is hyperlucency of the right lung likely related to underlying bullous change.  The heart is normal in size.  Calcified atheromatous disease affects the aorta.  Age-appropriate degenerative changes affect the skeleton.    EKG Baseline wander Normal sinus rhythm  Right axis deviation  T wave abnormality, consider anterior ischemia or Persistent Normal  variant, juvenile T waves  Abnormal ECG  When compared with ECG  of 06-JUL-2019 01:08,  No significant change was found

## 2019-07-16 NOTE — PROGRESS NOTES
"[facility] Medicine  Progress Note    Patient Name: Di Deluca  MRN: [id number]  Patient Class: IP- Inpatient   Admission Date: 7/12/2019  Length of Stay: 4 days  Attending Physician: Vee Tejeda MD  Primary Care Provider: Thomas Huertas MD        Subjective:     Principal Problem:COPD exacerbation      HPI:    Di Deluca is a 68 y.o. female with PMH of severe COPD /on  Home oxygen ,   LBP, spur, chickenpox, HTN, rheumatic disease, thyroid disease parented to ER with shortness of breath.  She used to live in [place] ; moved to her friend here " got kicked out of house "  Friend reports pain med issues . Multiple ER visit >  Seems home less ; has two living daughters :[place]. Recently fell broke left arm  Seen Dr damien armendariz ; went to er for pain meds and COPD   Patient has shortness of breath, longstanding history of COPD reported this p.m.  This is not new to us, this patient has been to the ER several times in last 2 months  She had  active wheezing on ER presentation this morning  Patient is on 4 liters of oxygen at home still smokes at least 2 packs a day for years.    Placed in observation for COPD exacerbation .  She will need NH placement ; no place to live ; multiple ER visits :  She was told for NH placement 3 months ago;she declined .      Overview/Hospital Course:  7/13/19  Looks better ;keeps asking for pain meds  Again long discussion .  Shortness of breath better   Wheezing better   BIPAP ordered.  She is on rocephin , zithromax,     7/14/19  Breathing wise much better   Will need NH placement ; home less   Will taper steroids  Continue nebs and antibiotics     7/15/19  She is on day 4 azithromycin and rocephin. She has reduced medrol 40mg IV every 8hr. Using O2 (has 3L at home) here on 2L pox 95%. No fever. WBC 94794. Still smoker. nicotine patch ordered  Asking about her Fioricet for chronic pain as this is what she is using at home long term  She " also reports that she has left short arm cast due to fall. Refused PT yesterday. Had it placed 1 week ago.     7/16/19 NAD overnight. 2LNC - O2 sat 98-99%  WBC 14.76> 11.31, afebrile   She is on day 5/5 azithromycin and rocephin. Getting prednisone 40mg bid; taper to 10mg bid then daily for home  Na+ 124>128 after 1LNS  Pt reports feeling is much better and breathing is much better.   She needs NH placement ; homeless ;with respiratory issues ;  Awaiting for nursing approval; level II screening in process .    Review of Systems   Constitutional: Negative for chills and fever.   HENT: Negative for congestion, hearing loss, sinus pressure and sore throat.    Eyes: Negative for photophobia.   Respiratory: Negative for apnea, cough, choking, chest tightness, shortness of breath and wheezing.    Cardiovascular: Negative for chest pain and palpitations.   Gastrointestinal: Negative for blood in stool, nausea and vomiting.   Genitourinary: Negative for dysuria and hematuria.   Musculoskeletal: Negative for arthralgias and myalgias.        Left arm in cast   Chronic pain issues    Skin: Negative for pallor.   Neurological: Negative for dizziness and numbness.   Hematological: Does not bruise/bleed easily.   Psychiatric/Behavioral: Negative for confusion and suicidal ideas. The patient is not nervous/anxious.      Objective:     Vital Signs (Most Recent):  Temp: 97.5 °F (36.4 °C) (07/16/19 0717)  Pulse: 86 (07/16/19 0717)  Resp: 17 (07/16/19 0717)  BP: 111/60 (07/16/19 0717)  SpO2: 98 % (07/16/19 0717) Vital Signs (24h Range):  Temp:  [97 °F (36.1 °C)-98.2 °F (36.8 °C)] 97.5 °F (36.4 °C)  Pulse:  [] 86  Resp:  [16-22] 17  SpO2:  [94 %-99 %] 98 %  BP: ()/(57-67) 111/60     Weight: 47.7 kg (105 lb 2.6 oz)  Body mass index is 18.05 kg/m².    Intake/Output Summary (Last 24 hours) at 7/16/2019 0749  Last data filed at 7/15/2019 1735  Gross per 24 hour   Intake 960 ml   Output --   Net 960 ml      Physical Exam    Constitutional: She is oriented to person, place, and time. She appears well-developed and well-nourished.   HENT:   Head: Normocephalic and atraumatic.   Right Ear: External ear normal.   Left Ear: External ear normal.   Nose: Nose normal.   Mouth/Throat: Oropharynx is clear and moist.   Eyes: Pupils are equal, round, and reactive to light. Conjunctivae and EOM are normal.   Neck: Normal range of motion. Neck supple. No thyromegaly present.   Cardiovascular: Normal rate, regular rhythm, normal heart sounds and intact distal pulses.   Pulmonary/Chest: Effort normal. No respiratory distress. She has no wheezes.   Abdominal: Soft. Bowel sounds are normal.   Musculoskeletal: Normal range of motion. She exhibits no edema.   Left arm in cast    Neurological: She is alert and oriented to person, place, and time. She has normal reflexes.   Skin: Skin is warm and dry.   Psychiatric: She has a normal mood and affect. Her behavior is normal. Judgment and thought content normal.   Nursing note and vitals reviewed.    Lab Results   Component Value Date    WBC 11.31 07/16/2019    HGB 11.5 (L) 07/16/2019    HCT 35.0 (L) 07/16/2019    MCV 85 07/16/2019     07/16/2019     BMP  Lab Results   Component Value Date     (L) 07/16/2019    K 4.7 07/16/2019    CL 83 (L) 07/16/2019    CO2 37 (H) 07/16/2019    BUN 14 07/16/2019    CREATININE 0.6 07/16/2019    CALCIUM 9.1 07/16/2019    ANIONGAP 8 07/16/2019    ESTGFRAFRICA >60 07/16/2019    EGFRNONAA >60 07/16/2019     Lab Results   Component Value Date    ALT 17 07/13/2019    AST 21 07/13/2019    ALKPHOS 65 07/13/2019    BILITOT 0.4 07/13/2019     Lactic acid 0.6  Lab Results   Component Value Date    DDIMER 1.15 (H) 07/12/2019     Lab Results   Component Value Date    INR 1.0 07/12/2019    INR 0.9 06/14/2019    INR 1.0 06/14/2017     BNP  Recent Labs   Lab 07/12/19  1018   *     Recent Labs   Lab 07/12/19  1018     100   CPKMB 5.1   TROPONINI 0.010   MB 5.1*      Flu negative .    Blood cultures NGTD x 2    CXR Chronic lung changes are noted bilaterally.  There is hyperlucency of the right lung likely related to underlying bullous change.  The heart is normal in size.  Calcified atheromatous disease affects the aorta.  Age-appropriate degenerative changes affect the skeleton.    EKG Baseline wander Normal sinus rhythm  Right axis deviation  T wave abnormality, consider anterior ischemia or Persistent Normal  variant, juvenile T waves  Abnormal ECG  When compared with ECG of 06-JUL-2019 01:08,  No significant change was found      Assessment/Plan:      * COPD exacerbation  IV steroids; taper from 125 to 40 mg ..    7/15: prednisone 40mg PO BID.    IV antibiotics x 1 more day  Nebs   Pulmonary consulted .    Improving and anticipate discharge soon. Awaiting NH placement      Acute respiratory failure with hypoxia and hypercarbia        Homeless single person  Will need NH placement she is not sure if she wants this.  working with her. If not waiting on nursing home may be able tod/c in am. Will need to find where she is going. She is working on that today   PPD ordered for NH placement .    Chronic narcotic use  Avoid narcotics.  tizinidine ordered   Fioricet ordered .    Closed nondisplaced fracture of styloid process of left ulna with routine healing  S/p cast   .PT\  F/u outpt.    Closed Colles' fracture of left radius with routine healing  S/p cast by orthopedics.  Will f/u oupt  PT.        Chronic obstructive pulmonary disease  Nebs  For now   Needs to stop smoking .      Chronic respiratory failure with hypoxia and hypercapnia  Continue nebs.  Checked ABG    BIPAP-- she is not using  Cont O2 via NC, on 2L and prescribed up to 3L for home use  7/16 Stable on 2LNC          Tobacco abuse  Nicotine patch .  Counseled again .      Essential hypertension  Continue amlodipine       Hyponatremia  Mos likely due to her pulmonary issues  BMP  Lab Results    Component Value Date     (L) 07/16/2019    K 4.7 07/16/2019    CL 83 (L) 07/16/2019    CO2 37 (H) 07/16/2019    BUN 14 07/16/2019    CREATININE 0.6 07/16/2019    CALCIUM 9.1 07/16/2019    ANIONGAP 8 07/16/2019    ESTGFRAFRICA >60 07/16/2019    EGFRNONAA >60 07/16/2019             VTE Risk Mitigation (From admission, onward)        Ordered     IP VTE HIGH RISK PATIENT  Once      07/12/19 1224     Place sequential compression device  Until discontinued      07/12/19 1224                Vee Tejeda MD  Department of Hospital Medicine   [facility]

## 2019-07-16 NOTE — PT/OT/SLP PROGRESS
Occupational Therapy   Treatment    Name: Di Deluca  MRN: [id number]  Admitting Diagnosis:  COPD exacerbation       Recommendations:     Discharge Recommendations: nursing facility, skilled  Discharge Equipment Recommendations:  none  Barriers to discharge:  Inaccessible home environment, Decreased caregiver support    Assessment:     Di Deluca is a 68 y.o. female with a medical diagnosis of COPD exacerbation.  She presents with some difficulty using left UE (wrist in a cast s/ p fx after a fall) to reach up and style her hair,.some medical difficulty with blood sugars which she relates to steroid use (from thoracic syndrome on left shoulder she says) but she was requesting coke and gatoraide which dietary/ nursing says she should not have, therapist did give her a coke zero at her request. Performance deficits affecting function are weakness, impaired functional mobilty, decreased safety awareness, impaired cardiopulmonary response to activity, impaired endurance, impaired self care skills.    Vital signs:  02 on 3 liters = 94%,,   HR = 100 bpm at rest    Rehab Prognosis:  Fair; patient would benefit from acute skilled OT services to address these deficits and reach maximum level of function.       Plan:     Patient to be seen   to address the above listed problems via self-care/home management, therapeutic activities, therapeutic exercises  · Plan of Care Expires: 07/31/19  · Plan of Care Reviewed with: patient    Subjective     Pain/Comfort:  · Pain Rating 1: 0/10  · Pain Rating Post-Intervention 1: 0/10    Objective:     Communicated with: nursing/ dietary prior to session.  Patient found supine with oxygen, telemetry upon OT entry to room.    General Precautions: Standard, fall   Orthopedic Precautions:N/A   Braces: UE brace(cast on left wrist)     Occupational Performance:     Bed Mobility:    · Patient completed Rolling/Turning to Left with  independence  · Patient completed Rolling/Turning to Right  with independence  · Patient completed Scooting/Bridging with independence  · Patient completed Supine to Sit with independence  · Patient completed Sit to Supine with independence     Functional Mobility/Transfers:  · Did not wish to get out of bed at this time, says she just had a bed bath with nursing and was cold    Activities of Daily Living:  · Feeding:  independence .  · Grooming: minimum assistance . to wash face, teeth.needed a little help to braid hair due to left shoulder pain and left wrist in a cast  · Bathing: minimum assistance per [facility] nursing for bed bathing  · Upper Body Dressing: independence gown  · Lower Body Dressing: independence socks  · Toileting: supervision to use BSC      AMPAC 6 Click ADL: 24    Treatment & Education:  Self care    Patient left HOB elevated sitting up in bed with all lines intact and call button in reachEducation:      GOALS:   Multidisciplinary Problems     Occupational Therapy Goals        Problem: Occupational Therapy Goal    Goal Priority Disciplines Outcome Interventions   Occupational Therapy Goal     OT, PT/OT     Description:  Goals to be met by: 7/31/2019     Patient will increase functional independence with ADLs by performing:    LE Dressing with Lamar.  Grooming while standing at sink with Lamar.  Toileting from toilet with Lamar for hygiene and clothing management.   Bathing from  shower chair/bench with Modified Lamar.  Upper extremity exercise program x10 reps per handout, with assistance as needed with stable vital signs. 02 > 90 % on 2 liters.                       Time Tracking:     OT Date of Treatment: 07/16/19  OT Start Time: 1015  OT Stop Time: 1045  OT Total Time (min): 30 min    Billable Minutes:Self Care/Home Management 30    Aidee Nettles OT  7/16/2019

## 2019-07-16 NOTE — NURSING
"Discussed the need for patient to receive pneumonia vaccine. Patient states, "I already had one, I'm not taking another one." On admission, its charted patient had not received one, but patient states otherwise and refuses injection.  "

## 2019-07-16 NOTE — PLAN OF CARE
07/16/19 1158   Post-Acute Status   Post-Acute Authorization Placement   Post-Acute Placement Status Pending State Certification   Discharge Delays (!) Other  (Pending Level II Screening by the Office of Behavioral Health. )       Patient accepted into [facility] pending Level II Screening from the Office of Behavioral Health.     Mee Morton LMSW

## 2019-07-16 NOTE — PT/OT/SLP PROGRESS
"Physical Therapy Treatment    Patient Name:  Di Deluca   MRN:  [id number]    Recommendations:     Discharge Recommendations:  nursing facility, skilled   Discharge Equipment Recommendations: none   Barriers to discharge: Inaccessible home and Decreased caregiver support    Assessment:     Di Deluca is a 68 y.o. female admitted with a medical diagnosis of COPD exacerbation.  She presents with the following impairments/functional limitations:  weakness, impaired endurance, impaired self care skills, impaired functional mobilty, gait instability, impaired balance, pain, decreased upper extremity function, impaired cardiopulmonary response to activity, decreased safety awareness.  Pt refused OOB activities today due to L UE pain and lack of "rest".  Pt was educated on HEP to be performed 4x/d.    Rehab Prognosis: Fair; patient would benefit from acute skilled PT services to address these deficits and reach maximum level of function.    Recent Surgery: * No surgery found *      Plan:     During this hospitalization, patient to be seen 5 x/week to address the identified rehab impairments via gait training, therapeutic activities, therapeutic exercises and progress toward the following goals:    · Plan of Care Expires:  07/25/19    Subjective     Chief Complaint: LUE pain  Patient/Family Comments/goals: decrease pain  Pain/Comfort:  · Pain Rating 1: 9/10  · Location - Side 1: Left  · Location - Orientation 1: generalized  · Location 1: shoulder  · Pain Addressed 1: Reposition  · Pain Rating Post-Intervention 1: 9/10      Objective:     Communicated with pt prior to session.  Patient found supine with oxygen, telemetry upon PT entry to room.     General Precautions: Standard, fall   Orthopedic Precautions:N/A   Braces: UE brace     Functional Mobility:  · Bed Mobility:     · Scooting: modified independence  · Supine to Sit: modified independence  · Sit to Supine: modified independence      AM-PAC 6 CLICK " MOBILITY  Turning over in bed (including adjusting bedclothes, sheets and blankets)?: 4  Sitting down on and standing up from a chair with arms (e.g., wheelchair, bedside commode, etc.): 3  Moving from lying on back to sitting on the side of the bed?: 4  Moving to and from a bed to a chair (including a wheelchair)?: 3  Need to walk in hospital room?: 3  Climbing 3-5 steps with a railing?: 3  Basic Mobility Total Score: 20       Therapeutic Activities and Exercises:   Supine to sit x 2  Scooting in supine x 6  B ankle pumps x 30  B quad sets x 30  B heel slides x 15  B SLRs x 15    Patient left supine with all lines intact and call button in reach..    GOALS:   Multidisciplinary Problems     Physical Therapy Goals        Problem: Physical Therapy Goal    Goal Priority Disciplines Outcome Goal Variances Interventions   Physical Therapy Goal     PT, PT/OT Ongoing (interventions implemented as appropriate)     Description:  Goals to be met by: 19     Patient will increase functional independence with mobility by performin. Sit to stand transfer with [place]ison  2. Bed to chair transfer with Addison  3. Gait  x 100 feet with Supervision.   4. Ascend/descend 17 stair with bilateral Handrails Stand-by Assistance.   5. Lower extremity exercise program x10 reps per handout, with independence                      Time Tracking:     PT Received On: 19  PT Start Time: 1055     PT Stop Time: 1105  PT Total Time (min): 10 min     Billable Minutes: Therapeutic Exercise 10    Treatment Type: Treatment  PT/PTA: PT           Kaleb Lora, PT  2019

## 2019-07-16 NOTE — ASSESSMENT & PLAN NOTE
Continue nebs.  Checked ABG    BIPAP-- she is not using  Cont O2 via NC, on 2L and prescribed up to 3L for home use  7/16 Stable on 2LNC

## 2019-07-16 NOTE — ASSESSMENT & PLAN NOTE
Mos likely due to her pulmonary issues  BMP  Lab Results   Component Value Date     (L) 07/16/2019    K 4.7 07/16/2019    CL 83 (L) 07/16/2019    CO2 37 (H) 07/16/2019    BUN 14 07/16/2019    CREATININE 0.6 07/16/2019    CALCIUM 9.1 07/16/2019    ANIONGAP 8 07/16/2019    ESTGFRAFRICA >60 07/16/2019    EGFRNONAA >60 07/16/2019

## 2019-07-16 NOTE — PROGRESS NOTES
[facility]  Pulmonology  Progress Note    Patient Name: Di Deluca  MRN: [id number]  Admission Date: 7/12/2019  Hospital Length of Stay: 4 days  Code Status: Full Code  Attending Provider: Vee Tejeda MD  Primary Care Provider: Thomas Huertas MD   Principal Problem: COPD exacerbation    Subjective:     Interval History: feels better is going to a NH    Objective:     Vital Signs (Most Recent):  Temp: 97.5 °F (36.4 °C) (07/16/19 0717)  Pulse: 94 (07/16/19 0805)  Resp: 17 (07/16/19 0717)  BP: 111/60 (07/16/19 0717)  SpO2: 98 % (07/16/19 0717) Vital Signs (24h Range):  Temp:  [97 °F (36.1 °C)-98.2 °F (36.8 °C)] 97.5 °F (36.4 °C)  Pulse:  [] 94  Resp:  [16-22] 17  SpO2:  [94 %-99 %] 98 %  BP: ()/(57-67) 111/60     Weight: 47.7 kg (105 lb 2.6 oz)  Body mass index is 18.05 kg/m².      Intake/Output Summary (Last 24 hours) at 7/16/2019 0907  Last data filed at 7/15/2019 1735  Gross per 24 hour   Intake 480 ml   Output --   Net 480 ml       Physical Exam   Constitutional: She is oriented to person, place, and time. She appears well-developed and well-nourished. She is cooperative.  Non-toxic appearance. She does not appear ill. No distress.   HENT:   Head: Normocephalic and atraumatic.   Right Ear: Hearing, tympanic membrane, external ear and ear canal normal.   Left Ear: Hearing, tympanic membrane, external ear and ear canal normal.   Nose: Nose normal. No mucosal edema, rhinorrhea or nasal deformity. No epistaxis. Right sinus exhibits no maxillary sinus tenderness and no frontal sinus tenderness. Left sinus exhibits no maxillary sinus tenderness and no frontal sinus tenderness.   Mouth/Throat: Uvula is midline, oropharynx is clear and moist and mucous membranes are normal. No trismus in the jaw. Normal dentition. No uvula swelling. No posterior oropharyngeal erythema.   Eyes: Conjunctivae and lids are normal. No scleral icterus.   Sclera clear bilat   Neck: Trachea normal, full  passive range of motion without pain and phonation normal. Neck supple.   Cardiovascular: Normal rate, regular rhythm, normal heart sounds, intact distal pulses and normal pulses.   Pulmonary/Chest: She is in respiratory distress (mild to moderate). She has decreased breath sounds in the right middle field, the right lower field, the left middle field and the left lower field. She has wheezes in the right lower field and the left lower field. She has rhonchi in the right lower field and the left lower field.   Abdominal: Soft. Normal appearance and bowel sounds are normal. She exhibits no distension. There is no tenderness.   Musculoskeletal: Normal range of motion. She exhibits no edema or deformity.   Neurological: She is alert and oriented to person, place, and time. She exhibits normal muscle tone. Coordination normal.   Skin: Skin is warm, dry and intact. She is not diaphoretic. No pallor.   Psychiatric: She has a normal mood and affect. Her speech is normal and behavior is normal. Judgment and thought content normal. Cognition and memory are normal.   Nursing note and vitals reviewed.      Vents:       Lines/Drains/Airways     Peripheral Intravenous Line                 Peripheral IV - Single Lumen 07/13/19 2100 20 G Right Wrist 2 days         Peripheral IV - Single Lumen 07/15/19 1300 20 G Right Upper Arm less than 1 day                Significant Labs:    CBC/Anemia Profile:  Recent Labs   Lab 07/15/19  0430 07/16/19  0648   WBC 14.76* 11.31   HGB 11.7* 11.5*   HCT 35.0* 35.0*    221   MCV 84 85   RDW 12.8 13.0        Chemistries:  Recent Labs   Lab 07/15/19  0430 07/16/19  0648   * 128*   K 4.8 4.7   CL 75* 83*   CO2 42* 37*   BUN 16 14   CREATININE 0.6 0.6   CALCIUM 9.5 9.1       All pertinent labs within the past 24 hours have been reviewed.    Significant Imaging:  I have reviewed all pertinent imaging results/findings within the past 24 hours.    Assessment/Plan:     * COPD  exacerbation  She still refuses bipap  Breathing is much improved   Patient with sob significant decreased BS    Chronic respiratory failure with hypoxia and hypercapnia  Secondary to smoking and narcotics suppressing ventilatory drive     Tobacco abuse  Hopefully she will remain off tobacco  Quit last week  Needs to stay off cigarettes           Javier Vance MD  Pulmonology  [facility]

## 2019-07-16 NOTE — ASSESSMENT & PLAN NOTE
IV steroids; taper from 125 to 40 mg ..    7/15: prednisone 40mg PO BID.    IV antibiotics x 1 more day  Nebs   Pulmonary consulted .    Improving and anticipate discharge soon. Awaiting NH placement

## 2019-07-16 NOTE — ASSESSMENT & PLAN NOTE
Will need NH placement she is not sure if she wants this.  working with her. If not waiting on nursing home may be able tod/c in am. Will need to find where she is going. She is working on that today   PPD ordered for NH placement .

## 2019-07-16 NOTE — PROGRESS NOTES
Staff Handoff    Bedside report received from BAYRON Zimmerman. VS stable. Afebrile. No distress noted. Assessment complete per flowsheet. Call bell in reach. Instructed to call for any needs. Will continue to monitor for any change in status.  Resident Handoff

## 2019-07-17 NOTE — PT/OT/SLP PROGRESS
Physical Therapy      Patient Name:  Di Deluca   MRN:  [id number]    Patient not seen today secondary to Patient unwilling to participate(Pt requesting for PT to come back ). Will follow-up later today.    Jaiver Clayton, PT

## 2019-07-17 NOTE — PROGRESS NOTES
"[facility] Hospital Medicine  Progress Note    Patient Name: Di Deluca  MRN: [id number]  Patient Class: IP- Inpatient   Admission Date: 7/12/2019  Length of Stay: 5 days  Attending Physician: Vee Tejeda MD  Primary Care Provider: Thomas Huertas MD        Subjective:     Principal Problem:COPD exacerbation      HPI:    Di Deluca is a 68 y.o. female with PMH of severe COPD /on  Home oxygen ,   LBP, spur, chickenpox, HTN, rheumatic disease, thyroid disease parented to ER with shortness of breath.  She used to live in [place] ; moved to her friend here " got kicked out of house "  Friend reports pain med issues . Multiple ER visit >  Seems home less ; has two living daughters :[place]. Recently fell broke left arm  Seen Dr damien armendariz ; went to er for pain meds and COPD   Patient has shortness of breath, longstanding history of COPD reported this p.m.  This is not new to us, this patient has been to the ER several times in last 2 months  She had  active wheezing on ER presentation this morning  Patient is on 4 liters of oxygen at home still smokes at least 2 packs a day for years.    Placed in observation for COPD exacerbation .  She will need NH placement ; no place to live ; multiple ER visits :  She was told for NH placement 3 months ago;she declined .      Overview/Hospital Course:  7/13/19  Looks better ;keeps asking for pain meds  Again long discussion .  Shortness of breath better   Wheezing better   BIPAP ordered.  She is on rocephin , zithromax,     7/14/19  Breathing wise much better   Will need NH placement ; home less   Will taper steroids  Continue nebs and antibiotics     7/15/19  She is on day 4 azithromycin and rocephin. She has reduced medrol 40mg IV every 8hr. Using O2 (has 3L at home) here on 2L pox 95%. No fever. WBC 25034. Still smoker. nicotine patch ordered  Asking about her Fioricet for chronic pain as this is what she is using at home long term  She " also reports that she has left short arm cast due to fall. Refused PT yesterday. Had it placed 1 week ago.     7/16/19 NAD overnight. 2LNC - O2 sat 98-99%  WBC 14.76> 11.31, afebrile   She is on day 5/5 azithromycin and rocephin. Getting prednisone 40mg bid; taper to 10mg bid then daily for home  Na+ 124>128 after 1LNS  Pt reports feeling is much better and breathing is much better.   She needs NH placement ; homeless ;with respiratory issues ;  Awaiting for nursing approval; level II screening in process .    7/17/19 NAD , Afebrile, WBC 8.18 , O2 98% on 2LNC   ABX completed for COPD exac, Prednisone 10mg bid;  this am   Na 129+ ,   Waiting on required level II forms for possible nursing home admission    Review of Systems   Constitutional: Negative for chills and fever.   HENT: Negative for congestion, hearing loss, sinus pressure and sore throat.    Eyes: Negative for photophobia.   Respiratory: Negative for apnea, cough, choking, chest tightness, shortness of breath and wheezing.    Cardiovascular: Negative for chest pain and palpitations.   Gastrointestinal: Negative for blood in stool, nausea and vomiting.   Genitourinary: Negative for dysuria and hematuria.   Musculoskeletal: Negative for arthralgias and myalgias.        Left arm in cast   Chronic pain issues    Skin: Negative for pallor.   Neurological: Negative for dizziness and numbness.   Hematological: Does not bruise/bleed easily.   Psychiatric/Behavioral: Negative for confusion and suicidal ideas. The patient is not nervous/anxious.      Objective:     Vital Signs (Most Recent):  Temp: 96.1 °F (35.6 °C) (07/17/19 0731)  Pulse: 86 (07/17/19 0731)  Resp: 17 (07/17/19 0731)  BP: 118/66 (07/17/19 0731)  SpO2: 98 % (07/17/19 0731) Vital Signs (24h Range):  Temp:  [96.1 °F (35.6 °C)-99.2 °F (37.3 °C)] 96.1 °F (35.6 °C)  Pulse:  [] 86  Resp:  [17-22] 17  SpO2:  [92 %-99 %] 98 %  BP: (101-118)/(57-66) 118/66     Weight: 47.7 kg (105 lb 2.6 oz)  Body  mass index is 18.05 kg/m².    Intake/Output Summary (Last 24 hours) at 7/17/2019 0817  Last data filed at 7/16/2019 1812  Gross per 24 hour   Intake 640 ml   Output --   Net 640 ml      Physical Exam   Constitutional: She is oriented to person, place, and time. She appears well-developed and well-nourished.   HENT:   Head: Normocephalic and atraumatic.   Right Ear: External ear normal.   Left Ear: External ear normal.   Nose: Nose normal.   Mouth/Throat: Oropharynx is clear and moist.   Eyes: Pupils are equal, round, and reactive to light. Conjunctivae and EOM are normal.   Neck: Normal range of motion. Neck supple. No thyromegaly present.   Cardiovascular: Normal rate, regular rhythm, normal heart sounds and intact distal pulses.   Pulmonary/Chest: Effort normal. No respiratory distress. She has no wheezes.   Abdominal: Soft. Bowel sounds are normal.   Musculoskeletal: Normal range of motion. She exhibits no edema.   Left arm in cast    Neurological: She is alert and oriented to person, place, and time. She has normal reflexes.   Skin: Skin is warm and dry.   Psychiatric: She has a normal mood and affect. Her behavior is normal. Judgment and thought content normal.   Nursing note and vitals reviewed.    Lab Results   Component Value Date    WBC 8.18 07/17/2019    HGB 11.2 (L) 07/17/2019    HCT 35.2 (L) 07/17/2019    MCV 87 07/17/2019     07/17/2019     BMP  Lab Results   Component Value Date     (L) 07/17/2019    K 4.8 07/17/2019    CL 85 (L) 07/17/2019    CO2 40 (H) 07/17/2019    BUN 16 07/17/2019    CREATININE 0.6 07/17/2019    CALCIUM 9.1 07/17/2019    ANIONGAP 4 (L) 07/17/2019    ESTGFRAFRICA >60 07/17/2019    EGFRNONAA >60 07/17/2019     Lab Results   Component Value Date    ALT 17 07/13/2019    AST 21 07/13/2019    ALKPHOS 65 07/13/2019    BILITOT 0.4 07/13/2019     Lactic acid 0.6  Lab Results   Component Value Date    DDIMER 1.15 (H) 07/12/2019     Lab Results   Component Value Date    INR 1.0  07/12/2019    INR 0.9 06/14/2019    INR 1.0 06/14/2017     BNP  Recent Labs   Lab 07/12/19  1018   *     Recent Labs   Lab 07/12/19  1018     100   CPKMB 5.1   TROPONINI 0.010   MB 5.1*     Flu negative .    Blood cultures NGTD x 2    CXR Chronic lung changes are noted bilaterally.  There is hyperlucency of the right lung likely related to underlying bullous change.  The heart is normal in size.  Calcified atheromatous disease affects the aorta.  Age-appropriate degenerative changes affect the skeleton.    EKG Baseline wander Normal sinus rhythm  Right axis deviation  T wave abnormality, consider anterior ischemia or Persistent Normal  variant, juvenile T waves  Abnormal ECG  When compared with ECG of 06-JUL-2019 01:08,  No significant change was found      Assessment/Plan:      * COPD exacerbation  IV steroids; taper from 125 to 40 mg ..    7/15: prednisone 40mg PO BID.    IV antibiotics x 1 more day  Nebs   Pulmonary consulted .    7/17 Prednisone 10mg bid; Abx completed Improving and anticipate discharge soon. Awaiting NH placement      Acute respiratory failure with hypoxia and hypercarbia        Homeless single person  Will need NH placement she is not sure if she wants this.  working with her. If not waiting on nursing home may be able tod/c in am. Will need to find where she is going. She is working on that today   PPD ordered for NH placement .    Chronic narcotic use  Avoid narcotics.  tizinidine ordered   Fioricet ordered .    Closed nondisplaced fracture of styloid process of left ulna with routine healing  S/p cast   .PT\  F/u outpt.    Closed Colles' fracture of left radius with routine healing  S/p cast by orthopedics.  Will f/u oupt  PT.        Chronic obstructive pulmonary disease  Nebs  For now   Needs to stop smoking .      Chronic respiratory failure with hypoxia and hypercapnia  Continue nebs.  Checked ABG    BIPAP-- she is not using  Cont O2 via NC, on 2L and  prescribed up to 3L for home use  7/16 Stable on 2LNC          Tobacco abuse  Nicotine patch .  Counseled again .      Essential hypertension  Continue amlodipine       Hyponatremia  Mos likely due to her pulmonary issues  BMP  Lab Results   Component Value Date     (L) 07/17/2019    K 4.8 07/17/2019    CL 85 (L) 07/17/2019    CO2 40 (H) 07/17/2019    BUN 16 07/17/2019    CREATININE 0.6 07/17/2019    CALCIUM 9.1 07/17/2019    ANIONGAP 4 (L) 07/17/2019    ESTGFRAFRICA >60 07/17/2019    EGFRNONAA >60 07/17/2019             VTE Risk Mitigation (From admission, onward)        Ordered     IP VTE HIGH RISK PATIENT  Once      07/12/19 1224     Place sequential compression device  Until discontinued      07/12/19 1224                Stephen Baltazar MD  Department of Hospital Medicine   [facility]

## 2019-07-17 NOTE — PT/OT/SLP PROGRESS
Physical Therapy      Patient Name:  Di Deluca   MRN:  [id number]    Patient not seen today secondary to Patient unwilling to participate(Pt still busy trying to get phone arrangement in preparation for her NH transfer). Pt still asking for PT to try later this PM. Will follow-up this PM.    Javier Clayton, PT

## 2019-07-17 NOTE — ASSESSMENT & PLAN NOTE
IV steroids; taper from 125 to 40 mg ..    7/15: prednisone 40mg PO BID.    IV antibiotics x 1 more day  Nebs   Pulmonary consulted .    7/17 Prednisone 10mg bid; Abx completed Improving and anticipate discharge soon. Awaiting NH placement

## 2019-07-17 NOTE — PT/OT/SLP PROGRESS
Occupational Therapy   Treatment    Name: Di Deluca  MRN: [id number]  Admitting Diagnosis:  COPD exacerbation       Recommendations:     Discharge Recommendations: nursing facility, skilled  Discharge Equipment Recommendations:  none  Barriers to discharge:  Inaccessible home environment, Decreased caregiver support    Assessment:     Di Deluca is a 68 y.o. female with a medical diagnosis of COPD exacerbation.  She presents with some self limiting behaviors , for instance, she actually can use her left arm/ hand as a gross assist in spite of it being in a cast but asks for help vs trying to do herself re: when holding phone in right hand asked for help writing things down vs holding the phone in her left hand and writing things with her right hand. Performance deficits affecting function are weakness, impaired functional mobilty, decreased safety awareness, impaired cardiopulmonary response to activity, impaired endurance, impaired self care skills.     Rehab Prognosis:  Fair; patient would benefit from acute skilled OT services to address these deficits and reach maximum level of function.       Plan:     Patient to be seen   to address the above listed problems via self-care/home management, therapeutic activities, therapeutic exercises  · Plan of Care Expires: 07/31/19  · Plan of Care Reviewed with: patient    Subjective     Pain/Comfort:  · Pain Rating 1: 0/10  · Pain Rating Post-Intervention 1: 0/10    Objective:     Communicated with: nursing prior to session.  Patient found HOB elevated with oxygen, telemetry upon OT entry to room.    General Precautions: Standard, fall   Orthopedic Precautions:N/A   Braces:       Occupational Performance:     Bed Mobility:    · Patient completed Rolling/Turning to Left with  independence  · Patient completed Rolling/Turning to Right with independence     Functional Mobility/Transfers:  · Did not wish to get out of bed at this time as she was on the phone trying to get  d/c information with her bank and finish breakfast. Agrees to get out of bed if she needs to use the bathroom later with nursing.    Activities of Daily Living:  · Feeding:  independence .  · Grooming: independence .      [facility] 6 Click ADL: 24    Treatment & Education:  Self care    Patient left HOB up and sitting up with all lines intact and call button in reachEducation:  . She reports one daughter lives in [facility] and the other daughter in [place] but is currently in [place] and both unavailable to help    GOALS:   Multidisciplinary Problems     Occupational Therapy Goals        Problem: Occupational Therapy Goal    Goal Priority Disciplines Outcome Interventions   Occupational Therapy Goal     OT, PT/OT     Description:  Goals to be met by: 7/31/2019     Patient will increase functional independence with ADLs by performing:    LE Dressing with [place].  Grooming while standing at sink with [place].  Toileting from toilet with [place] for hygiene and clothing management.   Bathing from  shower chair/bench with Modified [place].  Upper extremity exercise program x10 reps per handout, with assistance as needed with stable vital signs. 02 > 90 % on 2 liters.                       Time Tracking:     OT Date of Treatment: 07/17/19  OT Start Time: 1000  OT Stop Time: 1010  OT Total Time (min): 10 min    Billable Minutes:Self Care/Home Management 10    Aidee Nettles OT  7/17/2019

## 2019-07-17 NOTE — PT/OT/SLP PROGRESS
Physical Therapy      Patient Name:  Di Deluca   MRN:  [id number]    Patient not seen today secondary to Patient unwilling to participate(Pt wants to finish arrangement/phone discussion about placement). Pt asking for PT try again later. Will follow-up later this AM..    Javier Clayton, PT

## 2019-07-17 NOTE — PLAN OF CARE
Problem: Adult Inpatient Plan of Care  Goal: Plan of Care Review  Outcome: Ongoing (interventions implemented as appropriate)  Patient aware of plan of care. VS stable. Afebrile. NS with inverted t wave on tele. 2L O2 per NC in use, tolerating well. Breathing treatments. Accepted at [facility], awaiting state approval. Psych consult for NH placement, Dr. Antonio notified. Free from falls/injuries. No questions or concerns at this time. Agrees with plan of care.

## 2019-07-17 NOTE — PLAN OF CARE
Spoke with patient's daughter, Holley, to discuss nursing home placement status. Provided phone number to [facility].     Mee Morton LMSW

## 2019-07-17 NOTE — PROGRESS NOTES
[facility]  Pulmonology  Progress Note    Patient Name: Di Deluca  MRN: [id number]  Admission Date: 7/12/2019  Hospital Length of Stay: 5 days  Code Status: Full Code  Attending Provider: Vee Tejeda MD  Primary Care Provider: Thomas Huertas MD   Principal Problem: COPD exacerbation    Subjective:     Interval History: Confabulating word salad awake and alert     Objective:     Vital Signs (Most Recent):  Temp: 96.1 °F (35.6 °C) (07/17/19 0731)  Pulse: 86 (07/17/19 0731)  Resp: 17 (07/17/19 0731)  BP: 118/66 (07/17/19 0731)  SpO2: 98 % (07/17/19 0731) Vital Signs (24h Range):  Temp:  [96.1 °F (35.6 °C)-99.2 °F (37.3 °C)] 96.1 °F (35.6 °C)  Pulse:  [] 86  Resp:  [17-22] 17  SpO2:  [92 %-99 %] 98 %  BP: (101-118)/(57-66) 118/66     Weight: 47.7 kg (105 lb 2.6 oz)  Body mass index is 18.05 kg/m².      Intake/Output Summary (Last 24 hours) at 7/17/2019 0902  Last data filed at 7/16/2019 1812  Gross per 24 hour   Intake 400 ml   Output --   Net 400 ml       Physical Exam   Constitutional: She is oriented to person, place, and time. She appears well-developed and well-nourished. She is cooperative.  Non-toxic appearance. She does not appear ill. No distress.   HENT:   Head: Normocephalic and atraumatic.   Right Ear: Hearing, tympanic membrane, external ear and ear canal normal.   Left Ear: Hearing, tympanic membrane, external ear and ear canal normal.   Nose: Nose normal. No mucosal edema, rhinorrhea or nasal deformity. No epistaxis. Right sinus exhibits no maxillary sinus tenderness and no frontal sinus tenderness. Left sinus exhibits no maxillary sinus tenderness and no frontal sinus tenderness.   Mouth/Throat: Uvula is midline, oropharynx is clear and moist and mucous membranes are normal. No trismus in the jaw. Normal dentition. No uvula swelling. No posterior oropharyngeal erythema.   Eyes: Conjunctivae and lids are normal. No scleral icterus.   Sclera clear bilat   Neck: Trachea  normal, full passive range of motion without pain and phonation normal. Neck supple.   Cardiovascular: Normal rate, regular rhythm, normal heart sounds, intact distal pulses and normal pulses.   Pulmonary/Chest: She is in respiratory distress (mild to moderate). She has decreased breath sounds in the right middle field, the right lower field, the left middle field and the left lower field. She has no wheezes. She has rhonchi in the right lower field and the left lower field.   Abdominal: Soft. Normal appearance and bowel sounds are normal. She exhibits no distension. There is no tenderness.   Musculoskeletal: Normal range of motion. She exhibits no edema or deformity.   Neurological: She is alert and oriented to person, place, and time. She exhibits normal muscle tone. Coordination normal.   Skin: Skin is warm, dry and intact. She is not diaphoretic. No pallor.   Psychiatric: She has a normal mood and affect. Her speech is normal and behavior is normal. Judgment and thought content normal. Cognition and memory are normal.   Nursing note and vitals reviewed.      Vents:       Lines/Drains/Airways     Peripheral Intravenous Line                 Peripheral IV - Single Lumen 07/13/19 2100 20 G Right Wrist 3 days         Peripheral IV - Single Lumen 07/15/19 1300 20 G Right Upper Arm 1 day                Significant Labs:    CBC/Anemia Profile:  Recent Labs   Lab 07/16/19  0648 07/17/19  0606   WBC 11.31 8.18   HGB 11.5* 11.2*   HCT 35.0* 35.2*    217   MCV 85 87   RDW 13.0 13.1        Chemistries:  Recent Labs   Lab 07/16/19  0648 07/17/19  0606   * 129*   K 4.7 4.8   CL 83* 85*   CO2 37* 40*   BUN 14 16   CREATININE 0.6 0.6   CALCIUM 9.1 9.1       All pertinent labs within the past 24 hours have been reviewed.    Significant Imaging:  I have reviewed all pertinent imaging results/findings within the past 24 hours.    Assessment/Plan:     * COPD exacerbation  She still refuses bipap  Breathing is much  improved   Patient with sob significant decreased BS    Acute respiratory failure with hypoxia and hypercarbia  pco2 is elevated Now a co2 retainer severe ?? narcotics    Chronic respiratory failure with hypoxia and hypercapnia  Secondary to smoking and narcotics suppressing ventilatory drive     Tobacco abuse  Hopefully she will remain off tobacco  Quit last week  Needs to stay off cigarettes           Javier Vance MD  Pulmonology  [facility]

## 2019-07-17 NOTE — PT/OT/SLP PROGRESS
"Physical Therapy Treatment    Patient Name:  Di Deluca   MRN:  [id number]    Recommendations:     Discharge Recommendations:  nursing facility, skilled   Discharge Equipment Recommendations: none   Barriers to discharge: Decreased caregiver support and housing issues    Assessment:     Di Deluca is a 68 y.o. female admitted with a medical diagnosis of COPD exacerbation.  She presents with the following impairments/functional limitations:  weakness, impaired endurance, impaired self care skills, impaired functional mobilty, gait instability, impaired balance, pain, decreased upper extremity function, impaired cardiopulmonary response to activity, decreased safety awareness . Pt is able to perform bed mobility, transfers and short distance ambulation well. Pt limited by c/o pain and fear of using L arm (in cast) in performing ADL and mobility task. Required multiple attempts to get patient to complete therapy session with PT due to multiple patient concerns regarding her discharge location, pain management and "fatigue".     Rehab Prognosis: Fair; patient would benefit from acute skilled PT services to address these deficits and reach maximum level of function.    Recent Surgery: * No surgery found *      Plan:     During this hospitalization, patient to be seen 5 x/week to address the identified rehab impairments via gait training, therapeutic activities, therapeutic exercises and progress toward the following goals:    · Plan of Care Expires:  07/25/19    Subjective     Chief Complaint:  I haven't taken anything for pain   Patient/Family Comments/goals: I've been trying to get my papers released so I can get into a nursing home  Pain/Comfort:  · Pain Rating 1: 0/10  · Pain Rating Post-Intervention 1: 0/10      Objective:     Communicated with patient prior to session.  Patient found supine with oxygen, telemetry upon PT entry to room.     General Precautions: Standard, fall   Orthopedic Precautions:N/A "   Braces: N/A(L arm/ wrist on hard cast)     Functional Mobility:  · Bed Mobility:     · Rolling Left:  modified independence  · Rolling Right: modified independence  · Supine to Sit: modified independence  · Sit to Supine: modified independence  · Transfers:     · Sit to Stand:  supervision with no AD  · Bed to Chair: supervision with  no AD  using  Step Transfer  · Gait: pt able to ambulate without assistive device x 40 ft with CGA/SBA while on supplemental oxygen via NC.        AM-PAC 6 CLICK MOBILITY  Turning over in bed (including adjusting bedclothes, sheets and blankets)?: 4  Sitting down on and standing up from a chair with arms (e.g., wheelchair, bedside commode, etc.): 3  Moving from lying on back to sitting on the side of the bed?: 4  Moving to and from a bed to a chair (including a wheelchair)?: 3  Need to walk in hospital room?: 3  Climbing 3-5 steps with a railing?: 3  Basic Mobility Total Score: 20       Therapeutic Activities and Exercises:   Pt able to perform bed mobility, transfer activity from bed to chair, bed to commode and ambulation without assistive device with CGA/SBA.    Patient left supine with all lines intact and call button in reach..    GOALS:   Multidisciplinary Problems     Physical Therapy Goals        Problem: Physical Therapy Goal    Goal Priority Disciplines Outcome Goal Variances Interventions   Physical Therapy Goal     PT, PT/OT Ongoing (interventions implemented as appropriate)     Description:  Goals to be met by: 19     Patient will increase functional independence with mobility by performin. Sit to stand transfer with [place]  2. Bed to chair transfer with [place]  3. Gait  x 100 feet with Supervision.   4. Ascend/descend 17 stair with bilateral Handrails Stand-by Assistance.   5. Lower extremity exercise program x10 reps per handout, with independence                      Time Tracking:     PT Received On: 19  PT Start Time: 1509     PT Stop  Time: 1529  PT Total Time (min): 20 min     Billable Minutes: Therapeutic Activity 20    Treatment Type: Treatment  PT/PTA: PT           Javier Clayton, PT  07/17/2019

## 2019-07-17 NOTE — PLAN OF CARE
07/17/19 1213   Discharge Reassessment   Assessment Type Discharge Planning Reassessment         Patient will remain for continued treatment today. Medically accepted to [facility], waiting on level 2 screen. No needs or concerns voiced at this time. CM will continue to follow and assist as needed.

## 2019-07-17 NOTE — SUBJECTIVE & OBJECTIVE
Interval History: Confabulating word salad awake and alert     Objective:     Vital Signs (Most Recent):  Temp: 96.1 °F (35.6 °C) (07/17/19 0731)  Pulse: 86 (07/17/19 0731)  Resp: 17 (07/17/19 0731)  BP: 118/66 (07/17/19 0731)  SpO2: 98 % (07/17/19 0731) Vital Signs (24h Range):  Temp:  [96.1 °F (35.6 °C)-99.2 °F (37.3 °C)] 96.1 °F (35.6 °C)  Pulse:  [] 86  Resp:  [17-22] 17  SpO2:  [92 %-99 %] 98 %  BP: (101-118)/(57-66) 118/66     Weight: 47.7 kg (105 lb 2.6 oz)  Body mass index is 18.05 kg/m².      Intake/Output Summary (Last 24 hours) at 7/17/2019 0902  Last data filed at 7/16/2019 1812  Gross per 24 hour   Intake 400 ml   Output --   Net 400 ml       Physical Exam   Constitutional: She is oriented to person, place, and time. She appears well-developed and well-nourished. She is cooperative.  Non-toxic appearance. She does not appear ill. No distress.   HENT:   Head: Normocephalic and atraumatic.   Right Ear: Hearing, tympanic membrane, external ear and ear canal normal.   Left Ear: Hearing, tympanic membrane, external ear and ear canal normal.   Nose: Nose normal. No mucosal edema, rhinorrhea or nasal deformity. No epistaxis. Right sinus exhibits no maxillary sinus tenderness and no frontal sinus tenderness. Left sinus exhibits no maxillary sinus tenderness and no frontal sinus tenderness.   Mouth/Throat: Uvula is midline, oropharynx is clear and moist and mucous membranes are normal. No trismus in the jaw. Normal dentition. No uvula swelling. No posterior oropharyngeal erythema.   Eyes: Conjunctivae and lids are normal. No scleral icterus.   Sclera clear bilat   Neck: Trachea normal, full passive range of motion without pain and phonation normal. Neck supple.   Cardiovascular: Normal rate, regular rhythm, normal heart sounds, intact distal pulses and normal pulses.   Pulmonary/Chest: She is in respiratory distress (mild to moderate). She has decreased breath sounds in the right middle field, the right  lower field, the left middle field and the left lower field. She has no wheezes. She has rhonchi in the right lower field and the left lower field.   Abdominal: Soft. Normal appearance and bowel sounds are normal. She exhibits no distension. There is no tenderness.   Musculoskeletal: Normal range of motion. She exhibits no edema or deformity.   Neurological: She is alert and oriented to person, place, and time. She exhibits normal muscle tone. Coordination normal.   Skin: Skin is warm, dry and intact. She is not diaphoretic. No pallor.   Psychiatric: She has a normal mood and affect. Her speech is normal and behavior is normal. Judgment and thought content normal. Cognition and memory are normal.   Nursing note and vitals reviewed.      Vents:       Lines/Drains/Airways     Peripheral Intravenous Line                 Peripheral IV - Single Lumen 07/13/19 2100 20 G Right Wrist 3 days         Peripheral IV - Single Lumen 07/15/19 1300 20 G Right Upper Arm 1 day                Significant Labs:    CBC/Anemia Profile:  Recent Labs   Lab 07/16/19  0648 07/17/19  0606   WBC 11.31 8.18   HGB 11.5* 11.2*   HCT 35.0* 35.2*    217   MCV 85 87   RDW 13.0 13.1        Chemistries:  Recent Labs   Lab 07/16/19  0648 07/17/19  0606   * 129*   K 4.7 4.8   CL 83* 85*   CO2 37* 40*   BUN 14 16   CREATININE 0.6 0.6   CALCIUM 9.1 9.1       All pertinent labs within the past 24 hours have been reviewed.    Significant Imaging:  I have reviewed all pertinent imaging results/findings within the past 24 hours.

## 2019-07-17 NOTE — ASSESSMENT & PLAN NOTE
Mos likely due to her pulmonary issues  BMP  Lab Results   Component Value Date     (L) 07/17/2019    K 4.8 07/17/2019    CL 85 (L) 07/17/2019    CO2 40 (H) 07/17/2019    BUN 16 07/17/2019    CREATININE 0.6 07/17/2019    CALCIUM 9.1 07/17/2019    ANIONGAP 4 (L) 07/17/2019    ESTGFRAFRICA >60 07/17/2019    EGFRNONAA >60 07/17/2019

## 2019-07-17 NOTE — SUBJECTIVE & OBJECTIVE
Review of Systems   Constitutional: Negative for chills and fever.   HENT: Negative for congestion, hearing loss, sinus pressure and sore throat.    Eyes: Negative for photophobia.   Respiratory: Negative for apnea, cough, choking, chest tightness, shortness of breath and wheezing.    Cardiovascular: Negative for chest pain and palpitations.   Gastrointestinal: Negative for blood in stool, nausea and vomiting.   Genitourinary: Negative for dysuria and hematuria.   Musculoskeletal: Negative for arthralgias and myalgias.        Left arm in cast   Chronic pain issues    Skin: Negative for pallor.   Neurological: Negative for dizziness and numbness.   Hematological: Does not bruise/bleed easily.   Psychiatric/Behavioral: Negative for confusion and suicidal ideas. The patient is not nervous/anxious.      Objective:     Vital Signs (Most Recent):  Temp: 96.1 °F (35.6 °C) (07/17/19 0731)  Pulse: 86 (07/17/19 0731)  Resp: 17 (07/17/19 0731)  BP: 118/66 (07/17/19 0731)  SpO2: 98 % (07/17/19 0731) Vital Signs (24h Range):  Temp:  [96.1 °F (35.6 °C)-99.2 °F (37.3 °C)] 96.1 °F (35.6 °C)  Pulse:  [] 86  Resp:  [17-22] 17  SpO2:  [92 %-99 %] 98 %  BP: (101-118)/(57-66) 118/66     Weight: 47.7 kg (105 lb 2.6 oz)  Body mass index is 18.05 kg/m².    Intake/Output Summary (Last 24 hours) at 7/17/2019 0817  Last data filed at 7/16/2019 1812  Gross per 24 hour   Intake 640 ml   Output --   Net 640 ml      Physical Exam   Constitutional: She is oriented to person, place, and time. She appears well-developed and well-nourished.   HENT:   Head: Normocephalic and atraumatic.   Right Ear: External ear normal.   Left Ear: External ear normal.   Nose: Nose normal.   Mouth/Throat: Oropharynx is clear and moist.   Eyes: Pupils are equal, round, and reactive to light. Conjunctivae and EOM are normal.   Neck: Normal range of motion. Neck supple. No thyromegaly present.   Cardiovascular: Normal rate, regular rhythm, normal heart sounds and  intact distal pulses.   Pulmonary/Chest: Effort normal. No respiratory distress. She has no wheezes.   Abdominal: Soft. Bowel sounds are normal.   Musculoskeletal: Normal range of motion. She exhibits no edema.   Left arm in cast    Neurological: She is alert and oriented to person, place, and time. She has normal reflexes.   Skin: Skin is warm and dry.   Psychiatric: She has a normal mood and affect. Her behavior is normal. Judgment and thought content normal.   Nursing note and vitals reviewed.    Lab Results   Component Value Date    WBC 8.18 07/17/2019    HGB 11.2 (L) 07/17/2019    HCT 35.2 (L) 07/17/2019    MCV 87 07/17/2019     07/17/2019     BMP  Lab Results   Component Value Date     (L) 07/17/2019    K 4.8 07/17/2019    CL 85 (L) 07/17/2019    CO2 40 (H) 07/17/2019    BUN 16 07/17/2019    CREATININE 0.6 07/17/2019    CALCIUM 9.1 07/17/2019    ANIONGAP 4 (L) 07/17/2019    ESTGFRAFRICA >60 07/17/2019    EGFRNONAA >60 07/17/2019     Lab Results   Component Value Date    ALT 17 07/13/2019    AST 21 07/13/2019    ALKPHOS 65 07/13/2019    BILITOT 0.4 07/13/2019     Lactic acid 0.6  Lab Results   Component Value Date    DDIMER 1.15 (H) 07/12/2019     Lab Results   Component Value Date    INR 1.0 07/12/2019    INR 0.9 06/14/2019    INR 1.0 06/14/2017     BNP  Recent Labs   Lab 07/12/19  1018   *     Recent Labs   Lab 07/12/19  1018     100   CPKMB 5.1   TROPONINI 0.010   MB 5.1*     Flu negative .    Blood cultures NGTD x 2    CXR Chronic lung changes are noted bilaterally.  There is hyperlucency of the right lung likely related to underlying bullous change.  The heart is normal in size.  Calcified atheromatous disease affects the aorta.  Age-appropriate degenerative changes affect the skeleton.    EKG Baseline wander Normal sinus rhythm  Right axis deviation  T wave abnormality, consider anterior ischemia or Persistent Normal  variant, juvenile T waves  Abnormal ECG  When compared with  ECG of 06-JUL-2019 01:08,  No significant change was found

## 2019-07-17 NOTE — PLAN OF CARE
Problem: Adult Inpatient Plan of Care  Goal: Plan of Care Review  Outcome: Ongoing (interventions implemented as appropriate)  Assessment complete per flow sheet, sitting up in bed,2 L NC in use, CHEY lung sounds coarse,  continuous cardiac monitoring intact, attention seeking behavior noted, hard cast with bruising to LT hand and arm noted,  up to BSC with assist,  side rails up x 2, call bell in reach, instructed to call for needs, voiced understanding, will monitor.   Rested well through out the night, NAD noted

## 2019-07-18 NOTE — ASSESSMENT & PLAN NOTE
States she has quit  Hopefully she will remain off tobacco  Quit last week  Needs to stay off cigarettes

## 2019-07-18 NOTE — SUBJECTIVE & OBJECTIVE
Review of Systems   Constitutional: Negative for chills and fever.   HENT: Positive for ear discharge. Negative for congestion, hearing loss, sinus pressure and sore throat.    Eyes: Negative for photophobia.   Respiratory: Negative for apnea, cough, choking, chest tightness, shortness of breath and wheezing.    Cardiovascular: Negative for chest pain and palpitations.   Gastrointestinal: Negative for blood in stool, nausea and vomiting.   Genitourinary: Negative for dysuria and hematuria.   Musculoskeletal: Positive for arthralgias and back pain. Negative for myalgias.        Left arm in cast   Chronic pain issues    Skin: Negative for pallor.   Neurological: Negative for dizziness and numbness.   Hematological: Does not bruise/bleed easily.   Psychiatric/Behavioral: Positive for sleep disturbance. Negative for confusion and suicidal ideas. The patient is not nervous/anxious.      Objective:     Vital Signs (Most Recent):  Temp: 97.6 °F (36.4 °C) (07/18/19 0707)  Pulse: 88 (07/18/19 0743)  Resp: 20 (07/18/19 0743)  BP: 114/65 (07/18/19 0707)  SpO2: 96 % (07/18/19 0743) Vital Signs (24h Range):  Temp:  [96.7 °F (35.9 °C)-98 °F (36.7 °C)] 97.6 °F (36.4 °C)  Pulse:  [] 88  Resp:  [16-21] 20  SpO2:  [94 %-98 %] 96 %  BP: (113-142)/(61-75) 114/65     Weight: 47.7 kg (105 lb 2.6 oz)  Body mass index is 18.05 kg/m².    Intake/Output Summary (Last 24 hours) at 7/18/2019 0745  Last data filed at 7/17/2019 1700  Gross per 24 hour   Intake 960 ml   Output --   Net 960 ml      Physical Exam   Constitutional: She is oriented to person, place, and time. She appears well-developed and well-nourished.   HENT:   Head: Normocephalic and atraumatic.   Nose: Nose normal.   Mouth/Throat: Oropharynx is clear and moist.   Bilateral thick mucous to EAC   Eyes: Pupils are equal, round, and reactive to light. Conjunctivae and EOM are normal.   Neck: Normal range of motion. Neck supple. No thyromegaly present.   Cardiovascular: Normal  rate, regular rhythm, normal heart sounds and intact distal pulses.   Pulmonary/Chest: Effort normal. No respiratory distress. She has no wheezes.   02 in place   Abdominal: Soft. Bowel sounds are normal.   Musculoskeletal: Normal range of motion. She exhibits no edema.   Left arm in cast    Neurological: She is alert and oriented to person, place, and time. She has normal reflexes.   Skin: Skin is warm and dry.   Nursing note and vitals reviewed.    Lab Results   Component Value Date    WBC 8.18 07/17/2019    HGB 11.2 (L) 07/17/2019    HCT 35.2 (L) 07/17/2019    MCV 87 07/17/2019     07/17/2019     BMP  Lab Results   Component Value Date     (L) 07/17/2019    K 4.8 07/17/2019    CL 85 (L) 07/17/2019    CO2 40 (H) 07/17/2019    BUN 16 07/17/2019    CREATININE 0.6 07/17/2019    CALCIUM 9.1 07/17/2019    ANIONGAP 4 (L) 07/17/2019    ESTGFRAFRICA >60 07/17/2019    EGFRNONAA >60 07/17/2019     Lab Results   Component Value Date    ALT 17 07/13/2019    AST 21 07/13/2019    ALKPHOS 65 07/13/2019    BILITOT 0.4 07/13/2019     Lactic acid 0.6  Lab Results   Component Value Date    DDIMER 1.15 (H) 07/12/2019     Lab Results   Component Value Date    INR 1.0 07/12/2019    INR 0.9 06/14/2019    INR 1.0 06/14/2017     BNP  Recent Labs   Lab 07/12/19  1018   *     Recent Labs   Lab 07/12/19  1018     100   CPKMB 5.1   TROPONINI 0.010   MB 5.1*     Flu negative .    Blood cultures NGTD x 2    CXR Chronic lung changes are noted bilaterally.  There is hyperlucency of the right lung likely related to underlying bullous change.  The heart is normal in size.  Calcified atheromatous disease affects the aorta.  Age-appropriate degenerative changes affect the skeleton.    EKG Baseline wander Normal sinus rhythm  Right axis deviation  T wave abnormality, consider anterior ischemia or Persistent Normal  variant, juvenile T waves  Abnormal ECG  When compared with ECG of 06-JUL-2019 01:08,  No significant change  was found

## 2019-07-18 NOTE — SUBJECTIVE & OBJECTIVE
Interval History: Ready to go to NH    Objective:     Vital Signs (Most Recent):  Temp: 97.6 °F (36.4 °C) (07/18/19 0707)  Pulse: 86 (07/18/19 0800)  Resp: 20 (07/18/19 0743)  BP: 114/65 (07/18/19 0707)  SpO2: 96 % (07/18/19 0743) Vital Signs (24h Range):  Temp:  [96.7 °F (35.9 °C)-98 °F (36.7 °C)] 97.6 °F (36.4 °C)  Pulse:  [] 86  Resp:  [16-21] 20  SpO2:  [94 %-98 %] 96 %  BP: (113-142)/(61-75) 114/65     Weight: 47.7 kg (105 lb 2.6 oz)  Body mass index is 18.05 kg/m².      Intake/Output Summary (Last 24 hours) at 7/18/2019 0939  Last data filed at 7/17/2019 1700  Gross per 24 hour   Intake 720 ml   Output --   Net 720 ml       Physical Exam   Constitutional: She is oriented to person, place, and time. She appears well-developed and well-nourished. She is cooperative.  Non-toxic appearance. She does not appear ill. No distress.   HENT:   Head: Normocephalic and atraumatic.   Right Ear: Hearing, tympanic membrane, external ear and ear canal normal.   Left Ear: Hearing, tympanic membrane, external ear and ear canal normal.   Nose: Nose normal. No mucosal edema, rhinorrhea or nasal deformity. No epistaxis. Right sinus exhibits no maxillary sinus tenderness and no frontal sinus tenderness. Left sinus exhibits no maxillary sinus tenderness and no frontal sinus tenderness.   Mouth/Throat: Uvula is midline, oropharynx is clear and moist and mucous membranes are normal. No trismus in the jaw. Normal dentition. No uvula swelling. No posterior oropharyngeal erythema.   Eyes: Conjunctivae and lids are normal. No scleral icterus.   Sclera clear bilat   Neck: Trachea normal, full passive range of motion without pain and phonation normal. Neck supple.   Cardiovascular: Normal rate, regular rhythm, normal heart sounds, intact distal pulses and normal pulses.   Pulmonary/Chest: She is in respiratory distress (mild to moderate). She has decreased breath sounds in the right upper field, the right middle field, the right lower  field, the left upper field, the left middle field and the left lower field. She has no wheezes. She has no rhonchi.   Abdominal: Soft. Normal appearance and bowel sounds are normal. She exhibits no distension. There is no tenderness.   Musculoskeletal: Normal range of motion. She exhibits no edema or deformity.   Neurological: She is alert and oriented to person, place, and time. She exhibits normal muscle tone. Coordination normal.   Skin: Skin is warm, dry and intact. She is not diaphoretic. No pallor.   Psychiatric: She has a normal mood and affect. Her speech is normal and behavior is normal. Judgment and thought content normal. Cognition and memory are normal.   Nursing note and vitals reviewed.      Vents:  Oxygen Concentration (%): 28 (07/17/19 1943)    Lines/Drains/Airways     Peripheral Intravenous Line                 Peripheral IV - Single Lumen 07/13/19 2100 20 G Right Wrist 4 days         Peripheral IV - Single Lumen 07/15/19 1300 20 G Right Upper Arm 2 days                Significant Labs:    CBC/Anemia Profile:  Recent Labs   Lab 07/17/19  0606   WBC 8.18   HGB 11.2*   HCT 35.2*      MCV 87   RDW 13.1        Chemistries:  Recent Labs   Lab 07/17/19  0606   *   K 4.8   CL 85*   CO2 40*   BUN 16   CREATININE 0.6   CALCIUM 9.1       All pertinent labs within the past 24 hours have been reviewed.    Significant Imaging:  I have reviewed all pertinent imaging results/findings within the past 24 hours.

## 2019-07-18 NOTE — PLAN OF CARE
Problem: Adult Inpatient Plan of Care  Goal: Plan of Care Review  Outcome: Ongoing (interventions implemented as appropriate)  Assessment complete per flow sheet, sitting up in bed,2 L NC in use, CHEY lung sounds coarse,  continuous cardiac monitoring intact, attention seeking behavior noted, hard cast with bruising to LT hand and arm noted,  up to BSC with assist,  side rails up x 2, call bell in reach, instructed to call for needs, voiced understanding, repeated request for pain medications, tylenol given per MAR,   Rested well through out the night, NAD noted

## 2019-07-18 NOTE — PLAN OF CARE
07/18/19 1508   Post-Acute Status   Post-Acute Authorization Placement   Post-Acute Placement Status Pending State Certification   Discharge Delays (!) Other  (Level 2 Clearance)     Psychiatric evaluation received from Dr. Antonio. TREY faxed psychiatric evaluation, history and physical, and most recent drug screen to the [facility] as requested to complete Level II Screening for NH placement.     Mee Morton LMSW

## 2019-07-18 NOTE — ASSESSMENT & PLAN NOTE
Improving ever so slowly but better than from admit air entry still poor but considerly more than on admission   She still refuses bipap  Breathing is much improved   Patient with sob significant decreased BS

## 2019-07-18 NOTE — PROGRESS NOTES
"[facility] Medicine  Progress Note    Patient Name: Di Deluca  MRN: [id number]  Patient Class: IP- Inpatient   Admission Date: 7/12/2019  Length of Stay: 6 days  Attending Physician: Vee Tejeda MD  Primary Care Provider: Thomas Huertas MD        Subjective:     Principal Problem:COPD exacerbation      HPI:    Di Deluca is a 68 y.o. female with PMH of severe COPD /on  Home oxygen ,   LBP, spur, chickenpox, HTN, rheumatic disease, thyroid disease parented to ER with shortness of breath.  She used to live in [place] ; moved to her friend here " got kicked out of house "  Friend reports pain med issues . Multiple ER visit >  Seems home less ; has two living daughters :[place]. Recently fell broke left arm  Seen Dr damien armendariz ; went to er for pain meds and COPD   Patient has shortness of breath, longstanding history of COPD reported this p.m.  This is not new to us, this patient has been to the ER several times in last 2 months  She had  active wheezing on ER presentation this morning  Patient is on 4 liters of oxygen at home still smokes at least 2 packs a day for years.    Placed in observation for COPD exacerbation .  She will need NH placement ; no place to live ; multiple ER visits :  She was told for NH placement 3 months ago;she declined .      Overview/Hospital Course:  7/13/19  Looks better ;keeps asking for pain meds  Again long discussion .  Shortness of breath better   Wheezing better   BIPAP ordered.  She is on rocephin , zithromax,     7/14/19  Breathing wise much better   Will need NH placement ; home less   Will taper steroids  Continue nebs and antibiotics     7/15/19  She is on day 4 azithromycin and rocephin. She has reduced medrol 40mg IV every 8hr. Using O2 (has 3L at home) here on 2L pox 95%. No fever. WBC 85921. Still smoker. nicotine patch ordered  Asking about her Fioricet for chronic pain as this is what she is using at home long term  She " also reports that she has left short arm cast due to fall. Refused PT yesterday. Had it placed 1 week ago.     7/16/19 NAD overnight. 2LNC - O2 sat 98-99%  WBC 14.76> 11.31, afebrile   She is on day 5/5 azithromycin and rocephin. Getting prednisone 40mg bid; taper to 10mg bid then daily for home  Na+ 124>128 after 1LNS  Pt reports feeling is much better and breathing is much better.   She needs NH placement ; homeless ;with respiratory issues ;  Awaiting for nursing approval; level II screening in process .    7/17/19 NAD , Afebrile, WBC 8.18 , O2 98% on 2LNC   ABX completed for COPD exac, Prednisone 10mg bid;  this am   Na 129+ ,   Waiting on required level II forms for possible nursing home admission  7/18/19  Remains stable. Planned for psyche consult today for clearance  for Nursing home admission  pt able to ambulate without assistive device x 40 ft with CGA/SBA while on supplemental oxygen via NC.  - O 2 sat 96-97%  Getting prednisone 10mg bid; -134   C/O left ear drainage, requesting lidocaine patches for left shoulder pain and low back pain. C/o inability to sleep due to pain.   Notes she broke left arm 13 days ago trying to break fall. States she needs to wear cast for 6 weeks. Again asking for pain Rx    Review of Systems   Constitutional: Negative for chills and fever.   HENT: Positive for ear discharge. Negative for congestion, hearing loss, sinus pressure and sore throat.    Eyes: Negative for photophobia.   Respiratory: Negative for apnea, cough, choking, chest tightness, shortness of breath and wheezing.    Cardiovascular: Negative for chest pain and palpitations.   Gastrointestinal: Negative for blood in stool, nausea and vomiting.   Genitourinary: Negative for dysuria and hematuria.   Musculoskeletal: Positive for arthralgias and back pain. Negative for myalgias.        Left arm in cast   Chronic pain issues    Skin: Negative for pallor.   Neurological: Negative for dizziness and  numbness.   Hematological: Does not bruise/bleed easily.   Psychiatric/Behavioral: Positive for sleep disturbance. Negative for confusion and suicidal ideas. The patient is not nervous/anxious.      Objective:     Vital Signs (Most Recent):  Temp: 97.6 °F (36.4 °C) (07/18/19 0707)  Pulse: 88 (07/18/19 0743)  Resp: 20 (07/18/19 0743)  BP: 114/65 (07/18/19 0707)  SpO2: 96 % (07/18/19 0743) Vital Signs (24h Range):  Temp:  [96.7 °F (35.9 °C)-98 °F (36.7 °C)] 97.6 °F (36.4 °C)  Pulse:  [] 88  Resp:  [16-21] 20  SpO2:  [94 %-98 %] 96 %  BP: (113-142)/(61-75) 114/65     Weight: 47.7 kg (105 lb 2.6 oz)  Body mass index is 18.05 kg/m².    Intake/Output Summary (Last 24 hours) at 7/18/2019 0745  Last data filed at 7/17/2019 1700  Gross per 24 hour   Intake 960 ml   Output --   Net 960 ml      Physical Exam   Constitutional: She is oriented to person, place, and time. She appears well-developed and well-nourished.   HENT:   Head: Normocephalic and atraumatic.   Nose: Nose normal.   Mouth/Throat: Oropharynx is clear and moist.   Bilateral thick mucous to EAC   Eyes: Pupils are equal, round, and reactive to light. Conjunctivae and EOM are normal.   Neck: Normal range of motion. Neck supple. No thyromegaly present.   Cardiovascular: Normal rate, regular rhythm, normal heart sounds and intact distal pulses.   Pulmonary/Chest: Effort normal. No respiratory distress. She has no wheezes.   02 in place   Abdominal: Soft. Bowel sounds are normal.   Musculoskeletal: Normal range of motion. She exhibits no edema.   Left arm in cast    Neurological: She is alert and oriented to person, place, and time. She has normal reflexes.   Skin: Skin is warm and dry.   Nursing note and vitals reviewed.    Lab Results   Component Value Date    WBC 8.18 07/17/2019    HGB 11.2 (L) 07/17/2019    HCT 35.2 (L) 07/17/2019    MCV 87 07/17/2019     07/17/2019     BMP  Lab Results   Component Value Date     (L) 07/17/2019    K 4.8  07/17/2019    CL 85 (L) 07/17/2019    CO2 40 (H) 07/17/2019    BUN 16 07/17/2019    CREATININE 0.6 07/17/2019    CALCIUM 9.1 07/17/2019    ANIONGAP 4 (L) 07/17/2019    ESTGFRAFRICA >60 07/17/2019    EGFRNONAA >60 07/17/2019     Lab Results   Component Value Date    ALT 17 07/13/2019    AST 21 07/13/2019    ALKPHOS 65 07/13/2019    BILITOT 0.4 07/13/2019     Lactic acid 0.6  Lab Results   Component Value Date    DDIMER 1.15 (H) 07/12/2019     Lab Results   Component Value Date    INR 1.0 07/12/2019    INR 0.9 06/14/2019    INR 1.0 06/14/2017     BNP  Recent Labs   Lab 07/12/19  1018   *     Recent Labs   Lab 07/12/19  1018     100   CPKMB 5.1   TROPONINI 0.010   MB 5.1*     Flu negative .    Blood cultures NGTD x 2    CXR Chronic lung changes are noted bilaterally.  There is hyperlucency of the right lung likely related to underlying bullous change.  The heart is normal in size.  Calcified atheromatous disease affects the aorta.  Age-appropriate degenerative changes affect the skeleton.    EKG Baseline wander Normal sinus rhythm  Right axis deviation  T wave abnormality, consider anterior ischemia or Persistent Normal  variant, juvenile T waves  Abnormal ECG  When compared with ECG of 06-JUL-2019 01:08,  No significant change was found      Assessment/Plan:      * COPD exacerbation  IV steroids; taper from 125 to 40 mg ..    7/15: prednisone 40mg PO BID.    IV antibiotics x 1 more day  Nebs   Pulmonary consulted .    7/17 Prednisone 10mg bid; Abx completed Improving and anticipate discharge soon. Awaiting NH placement      Acute respiratory failure with hypoxia and hypercarbia        Homeless single person  Will need NH placement she is not sure if she wants this.  working with her. If not waiting on nursing home may be able tod/c in am. Will need to find where she is going. She is working on that today   PPD ordered for NH placement .  Psyche eval this am per Dr. Antonio, awaiting  approval for nursing home     Chronic narcotic use  Avoid narcotics.  tizinidine ordered   Fioricet ordered .  Requesting lidocaine patch     Closed nondisplaced fracture of styloid process of left ulna with routine healing  S/p cast   .PT\  F/u outpt.    Closed Colles' fracture of left radius with routine healing  S/p cast by orthopedics.  Will f/u oupt  PT.        Chronic obstructive pulmonary disease  Nebs  For now   Needs to stop smoking .      Chronic respiratory failure with hypoxia and hypercapnia  Continue nebs.  Checked ABG    BIPAP-- she is not using  Cont O2 via NC, on 2L and prescribed up to 3L for home use  7/16 Stable on 2LNC          Tobacco abuse  Nicotine patch .  Counseled again .      Essential hypertension  Continue amlodipine       Hyponatremia  Mos likely due to her pulmonary issues  BMP  Lab Results   Component Value Date     (L) 07/17/2019    K 4.8 07/17/2019    CL 85 (L) 07/17/2019    CO2 40 (H) 07/17/2019    BUN 16 07/17/2019    CREATININE 0.6 07/17/2019    CALCIUM 9.1 07/17/2019    ANIONGAP 4 (L) 07/17/2019    ESTGFRAFRICA >60 07/17/2019    EGFRNONAA >60 07/17/2019       Improved.  Free water restriction.      VTE Risk Mitigation (From admission, onward)        Ordered     IP VTE HIGH RISK PATIENT  Once      07/12/19 1224     Place sequential compression device  Until discontinued      07/12/19 1224                Nereida Zeng MD  Department of Hospital Medicine   [facility]

## 2019-07-18 NOTE — ASSESSMENT & PLAN NOTE
Will need NH placement she is not sure if she wants this.  working with her. If not waiting on nursing home may be able tod/c in am. Will need to find where she is going. She is working on that today   PPD ordered for NH placement .  Psyche eval this am per Dr. Antonio, awaiting approval for nursing home
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
## 2019-07-18 NOTE — PROGRESS NOTES
[facility]  Pulmonology  Progress Note    Patient Name: Di Deluca  MRN: [id number]  Admission Date: 7/12/2019  Hospital Length of Stay: 6 days  Code Status: Full Code  Attending Provider: Vee Tejeda MD  Primary Care Provider: Thomas Huertas MD   Principal Problem: COPD exacerbation    Subjective:     Interval History: Ready to go to NH    Objective:     Vital Signs (Most Recent):  Temp: 97.6 °F (36.4 °C) (07/18/19 0707)  Pulse: 86 (07/18/19 0800)  Resp: 20 (07/18/19 0743)  BP: 114/65 (07/18/19 0707)  SpO2: 96 % (07/18/19 0743) Vital Signs (24h Range):  Temp:  [96.7 °F (35.9 °C)-98 °F (36.7 °C)] 97.6 °F (36.4 °C)  Pulse:  [] 86  Resp:  [16-21] 20  SpO2:  [94 %-98 %] 96 %  BP: (113-142)/(61-75) 114/65     Weight: 47.7 kg (105 lb 2.6 oz)  Body mass index is 18.05 kg/m².      Intake/Output Summary (Last 24 hours) at 7/18/2019 0939  Last data filed at 7/17/2019 1700  Gross per 24 hour   Intake 720 ml   Output --   Net 720 ml       Physical Exam   Constitutional: She is oriented to person, place, and time. She appears well-developed and well-nourished. She is cooperative.  Non-toxic appearance. She does not appear ill. No distress.   HENT:   Head: Normocephalic and atraumatic.   Right Ear: Hearing, tympanic membrane, external ear and ear canal normal.   Left Ear: Hearing, tympanic membrane, external ear and ear canal normal.   Nose: Nose normal. No mucosal edema, rhinorrhea or nasal deformity. No epistaxis. Right sinus exhibits no maxillary sinus tenderness and no frontal sinus tenderness. Left sinus exhibits no maxillary sinus tenderness and no frontal sinus tenderness.   Mouth/Throat: Uvula is midline, oropharynx is clear and moist and mucous membranes are normal. No trismus in the jaw. Normal dentition. No uvula swelling. No posterior oropharyngeal erythema.   Eyes: Conjunctivae and lids are normal. No scleral icterus.   Sclera clear bilat   Neck: Trachea normal, full passive range  of motion without pain and phonation normal. Neck supple.   Cardiovascular: Normal rate, regular rhythm, normal heart sounds, intact distal pulses and normal pulses.   Pulmonary/Chest: She is in respiratory distress (mild to moderate). She has decreased breath sounds in the right upper field, the right middle field, the right lower field, the left upper field, the left middle field and the left lower field. She has no wheezes. She has no rhonchi.   Abdominal: Soft. Normal appearance and bowel sounds are normal. She exhibits no distension. There is no tenderness.   Musculoskeletal: Normal range of motion. She exhibits no edema or deformity.   Neurological: She is alert and oriented to person, place, and time. She exhibits normal muscle tone. Coordination normal.   Skin: Skin is warm, dry and intact. She is not diaphoretic. No pallor.   Psychiatric: She has a normal mood and affect. Her speech is normal and behavior is normal. Judgment and thought content normal. Cognition and memory are normal.   Nursing note and vitals reviewed.      Vents:  Oxygen Concentration (%): 28 (07/17/19 1943)    Lines/Drains/Airways     Peripheral Intravenous Line                 Peripheral IV - Single Lumen 07/13/19 2100 20 G Right Wrist 4 days         Peripheral IV - Single Lumen 07/15/19 1300 20 G Right Upper Arm 2 days                Significant Labs:    CBC/Anemia Profile:  Recent Labs   Lab 07/17/19  0606   WBC 8.18   HGB 11.2*   HCT 35.2*      MCV 87   RDW 13.1        Chemistries:  Recent Labs   Lab 07/17/19  0606   *   K 4.8   CL 85*   CO2 40*   BUN 16   CREATININE 0.6   CALCIUM 9.1       All pertinent labs within the past 24 hours have been reviewed.    Significant Imaging:  I have reviewed all pertinent imaging results/findings within the past 24 hours.    Assessment/Plan:     * COPD exacerbation  Improving ever so slowly but better than from admit air entry still poor but considerly more than on admission   She  still refuses bipap  Breathing is much improved   Patient with sob significant decreased BS    Chronic narcotic use  Needs to stop    Chronic respiratory failure with hypoxia and hypercapnia  Secondary to smoking and narcotics suppressing ventilatory drive     Tobacco abuse  States she has quit  Hopefully she will remain off tobacco  Quit last week  Needs to stay off cigarettes           Javier Vance MD  Pulmonology  [facility]

## 2019-07-18 NOTE — PLAN OF CARE
Problem: Adult Inpatient Plan of Care  Goal: Plan of Care Review  Outcome: Ongoing (interventions implemented as appropriate)  Patient aware of plan of care. VS stable. Afebrile. NS with inverted t wave on tele. 2L O2 per NC in use, tolerating well. Breathing treatments. Accepted at [facility], awaiting state approval. Psych eval per Dr. Antonio today. pneumonia vaccine given. Lidocaine patches applied to lower back and left shoulder. Free from falls/injuries. No questions or concerns at this time. Agrees with plan of care.

## 2019-07-18 NOTE — PT/OT/SLP PROGRESS
"Physical Therapy Treatment    Patient Name:  Di Deluca   MRN:  [id number]    Recommendations:     Discharge Recommendations:  nursing facility, skilled   Discharge Equipment Recommendations: none   Barriers to discharge: Inaccessible home and Decreased caregiver support    Assessment:     Di Deluca is a 68 y.o. female admitted with a medical diagnosis of COPD exacerbation.  She presents with the following impairments/functional limitations:  weakness, impaired endurance, impaired self care skills, gait instability, impaired balance, pain, impaired cardiopulmonary response to activity, decreased safety awareness, decreased upper extremity function. Patient is showing motivation to walk and move around. Patient reports pain on left arm but does not limit patient's performance with mobility and gait tasks using RW and portable oxygen.  SPO2 prior to gait= 94%; SPO2 after gait= 89%; After a minute rest SPO2 went up to 97%..  Rehab Prognosis: Good; patient would benefit from acute skilled PT services to address these deficits and reach maximum level of function.    Recent Surgery: * No surgery found *      Plan:     During this hospitalization, patient to be seen 5 x/week to address the identified rehab impairments via gait training, therapeutic activities, therapeutic exercises and progress toward the following goals:    · Plan of Care Expires:  07/25/19    Subjective     Chief Complaint: SOB after  A distance walk; Pain on left arm  Patient/Family Comments/goals: "To be able to walk better"  Pain/Comfort:  · Pain Rating 1: 7/10  · Location - Side 1: Left  · Location - Orientation 1: generalized  · Location 1: arm  · Pain Addressed 1: Reposition  · Pain Rating Post-Intervention 1: 5/10      Objective:     Communicated with patient  prior to session.  Patient found sitting up at edge of the bed with oxygen, telemetry upon PT entry to room.     General Precautions: Standard, fall   Orthopedic Precautions:N/A "   Braces: N/A     Functional Mobility:  · Bed Mobility:     · Rolling Left:  supervision  · Rolling Right: supervision  · Scooting: supervision  · Supine to Sit: supervision  · Sit to Supine: supervision  · Transfers:     · Sit to Stand:  stand by assistance with rolling walker  · Bed to Chair: stand by assistance with  rolling walker  using  Step Transfer  · Gait: RW Ambulation using portable oxygen(3 L/M) x 85 feet(twice) with SBA. Dec myriam, dec weight shifting and noted SOB upon exertion.  · Stairs:  Pt ascended/descended 4 steps  stair(s) with No Assistive Device with right handrail with Stand-by Assistance.       AM-PAC 6 CLICK MOBILITY  Turning over in bed (including adjusting bedclothes, sheets and blankets)?: 4  Sitting down on and standing up from a chair with arms (e.g., wheelchair, bedside commode, etc.): 3  Moving from lying on back to sitting on the side of the bed?: 4  Moving to and from a bed to a chair (including a wheelchair)?: 3  Need to walk in hospital room?: 3  Climbing 3-5 steps with a railing?: 3  Basic Mobility Total Score: 20       Therapeutic Activities and Exercises:   Worked on body mechanics with step transfers using RW followed by Gait trng using RW and Stairs Mgt uisng Right handrail.    Patient left up at the bedside commode with all lines intact, call button in reach and PCT Savita notified..    GOALS:   Multidisciplinary Problems     Physical Therapy Goals        Problem: Physical Therapy Goal    Goal Priority Disciplines Outcome Goal Variances Interventions   Physical Therapy Goal     PT, PT/OT Ongoing (interventions implemented as appropriate)     Description:  Goals to be met by: 19     Patient will increase functional independence with mobility by performin. Sit to stand transfer with Dillon  2. Bed to chair transfer with Dillon  3. Gait  x 100 feet with Supervision.   4. Ascend/descend 17 stair with bilateral Handrails Stand-by Assistance.   5. Lower  extremity exercise program x10 reps per handout, with independence                      Time Tracking:     PT Received On: 07/18/19  PT Start Time: 1247     PT Stop Time: 1317  PT Total Time (min): 30 min     Billable Minutes: Gait Training 15 and Therapeutic Activity 15    Treatment Type: Treatment              Ryne Cabezas, PT  07/18/2019

## 2019-07-18 NOTE — MEDICAL/APP STUDENT
PSYCHIATRY INPATIENT ADMISSION NOTE - H & P      7/18/2019 7:45 AM   Di Deluca   1950   [id number]           DATE OF ADMISSION: 7/12/2019 10:01 AM    SITE: [facility]    CURRENT LEGAL STATUS: PEC and/or CEC      HISTORY    CHIEF COMPLAINT   Di Deluca is a 68 y.o. female with a past psychiatric history of none, currently admitted to the inpatient unit with the following chief complaint: COPD exacerbation     HPI   (Elements: Location, Quality, Severity, Duration, Timing, Content, Modifying Factors, Associated Signs & Symptoms)    The patient was seen and examined. The chart was reviewed.    The patient presented to the ER on 7/12/19 with complaints of SOB and broken L wrist    The patient was medically cleared and admitted to the [facility].          Symptoms of Depression: -diminished mood or loss of interest/anhedonia; -irritability, +diminished energy, -change in sleep, -change in appetite, -diminished concentration or cognition or indecisiveness, -PMA/R, -excessive guilt or hopelessness or worthlessness, -suicidal ideations    Sleep: initiation, maintenance, -early morning awakening with inability to return to sleep    Suicidal/Homicidal ideations: -active/passive ideations, organized plans, future intentions    Symptoms of psychosis: -hallucinations, -delusions, -disorganized thinking, -disorganized behavior or -abnormal motor behavior, or -negative symptoms (diminshed emotional expression, avolition, anhedonia, alogia, asociality     Symptoms of -jace or hypomania: -elevated, -expansive, or- irritable mood with increased energy or activity; with -inflated self-esteem or grandiosity, decreased need for sleep, increased rate of speech, FOI or racing thoughts, distractibility, increased goal directed activity or PMA, risky/disinhibited behavior    Symptoms of MICHELLE: -excessive anxiety/worry/fear,- more days than not, -about numerous issues, -difficult to control, -with restlessness, -fatigue, -poor  concentration, irritability, muscle tension, sleep disturbance; causes functionally impairing distress     Symptoms of Panic Disorder: -recurrent panic attacks, -precipitated or -un-precipitated, -source of worry and/or behavioral changes secondary; with or without agoraphobia    Symptoms of PTSD:- h/o trauma; -re-experiencing/intrusive symptoms, avoidant behavior, negative alterations in cognition or mood, or hyperarousal symptoms; with or without dissociative symptoms     Symptoms of OCD: -obsessions or compulsions     Symptoms of Eating Disorders: -anorexia, -bulimia or- binging    Substance Use: intoxication, withdrawal, tolerance, used in larger amounts or duration than intended, unsuccessful attempts to limit or quit, increased time engaging in or seeking out, cravings or strong desire to use, failure to fulfill obligations, negative consequences in social/interpersonal/occupational,/recreational areas, use in dangerous situations, medical or psychological consequences       PSYCHOTHERAPY ADD-ON +[id number]   30 (16-37*) minutes    Time: *** minutes  Participants: Met with patient    Therapeutic Intervention Type: behavior modifying psychotherapy, supportive psychotherapy  Why chosen therapy is appropriate versus another modality: relevant to diagnosis, patient responds to this modality, evidence based practice    Target symptoms: {PSY TARGET SYMPTOMS:99117}  Primary focus: ***  Psychotherapeutic techniques: ***    Outcome monitoring methods: self-report, observation    Patient's response to intervention:  The patient's response to intervention is {response:04410}.    Progress toward goals:  The patient's progress toward goals is {progress:49649}.            PAST PSYCHIATRIC HISTORY  Previous Psychiatric Hospitalizations: no   Previous SI/HI: no  Previous Suicide Attempts: no   Previous Medication Trials: amytriptaline  Psychiatric Care (current & past): yes IM  proscribed TCA after thyroid cx  History of  Psychotherapy: yes in 89  History of Violence: no      SUBSTANCE ABUSE HISTORY   Tobacco: yes 7 pk/yrs   Alcohol: hasn't drank for 30 yrs  Illicit Substances: yes back in college MJ, cocaine  Misuse of Prescription Medications: problem with codeine lasting from 1985 -92. Has extensive history of surgeries and then being on pain medication and having issues with the pain medications. Has been off pain meds since court ordered rehab  Detoxes: yes  Rehabs: yes for codeine 98  12 Step Meetings: yes after rehab  Periods of Sobriety: yes 2 yrs and   Withdrawal: yes        PAST MEDICAL & SURGICAL HISTORY   Past Medical History:   Diagnosis Date    Asthma     Back problem     Calcaneal spur     Chicken pox 2015    Hypertension     Rheumatic disease     Thyroid cancer      Past Surgical History:   Procedure Laterality Date    HYSTERECTOMY      left knee      THYROIDECTOMY      TONSILLECTOMY            CURRENT MEDICATION REGIMEN   Home Meds:   Prior to Admission medications    Medication Sig Start Date End Date Taking? Authorizing Provider   HYDROcodone-acetaminophen (NORCO)  mg per tablet Take 1 tablet by mouth every 6 (six) hours as needed (pain). 7/8/19 7/18/19 Yes Pal Helton MD   albuterol 90 mcg/actuation inhaler Inhale 2 puffs into the lungs every 6 (six) hours as needed for Wheezing. Rescue 6/16/17 6/16/18  Juana Lyons MD   albuterol-ipratropium (DUO-NEB) 2.5 mg-0.5 mg/3 mL nebulizer solution Take 3 mLs by nebulization every 6 (six) hours as needed for Wheezing or Shortness of Breath. Rescue 7/6/19 7/5/20  Pallavi Sunkara, MD   amlodipine (NORVASC) 10 MG tablet Take 1 tablet (10 mg total) by mouth once daily. 6/16/17   Juana Lyons MD   butalbital-aspirin-caffeine -40 mg (FIORINAL) -40 mg Cap Take 1 capsule by mouth every 4 (four) hours as needed. 6/16/17   Juana Lyons MD   levothyroxine (SYNTHROID) 125 MCG tablet Take 125 mcg by mouth once daily.    Historical  Provider, MD   rosuvastatin (CRESTOR) 10 MG tablet Take 10 mg by mouth once daily.    Historical Provider, MD          OTC Meds: yes mg vit c omega 3     Scheduled Meds:    albuterol-ipratropium  3 mL Nebulization Q4H    amLODIPine  10 mg Oral Daily    levothyroxine  125 mcg Oral Daily    meloxicam  7.5 mg Oral Daily    nicotine  1 patch Transdermal Daily    pantoprazole  40 mg Oral Daily    predniSONE  10 mg Oral BID    rosuvastatin  10 mg Oral Daily      PRN Meds: acetaminophen, acetaminophen, albuterol sulfate, butalbital-acetaminophen-caffeine -40 mg, Dextrose 10% Bolus, Dextrose 10% Bolus, glucagon (human recombinant), glucose, glucose, insulin aspart U-100, lactulose, ondansetron, promethazine (PHENERGAN) IVPB, sodium chloride 0.9%, tiZANidine   Psychotherapeutics (From admission, onward)    None            ALLERGIES   Review of patient's allergies indicates:   Allergen Reactions    Benzodiazepines     Lidocaine     Toradol [ketorolac]     Tramadol          NEUROLOGIC HISTORY  Seizures: no    Head trauma: yes [facility]  1984      FAMILY PSYCHIATRIC HISTORY   Family History   Problem Relation Age of Onset    Depression Mother     Hypertension Sister     Blindness Neg Hx     Glaucoma Neg Hx     Macular degeneration Neg Hx     Retinal detachment Neg Hx                SOCIAL HISTORY  Developmental/Childhood: no   History of Physical/Sexual Abuse: no   Education: college  For 3 yrs  Employment: [employer] with [employer]   Financial: no   Relationship Status/Sexual Orientation: 1st  still living in [place] 2nd   25 yrs ago  Children: 2 daughters adult age they now live in [place] and [place]  Housing Status: recently homeless, due to issue with social security    Taoism: Anabaptist very active   History: no  was in [place]   Recreational Activities: talk to children. Go to movies  Access to Gun: no       LEGAL HISTORY   Past  Charges/Incarcerations: yes 23 yrs ago also  ordered rehab is convicted felon  Pending Charges: no      ROS  General ROS: negative  Ophthalmic ROS: negative  ENT ROS: positive for - left ear pain  Allergy and Immunology ROS: negative  Hematological and Lymphatic ROS: negative  Endocrine ROS: negative  Respiratory ROS: positive for - cough and shortness of breath  Cardiovascular ROS: no chest pain or dyspnea on exertion  Gastrointestinal ROS: positive for - abdominal pain  Genito-Urinary ROS: no dysuria, trouble voiding, or hematuria  Musculoskeletal ROS: positive for - pain in back - lower, hand - left and shoulder - left  Neurological ROS: no TIA or stroke symptoms  Dermatological ROS: negative      EXAMINATION      PHYSICAL EXAM  Reviewed note/exam by  *** from *** at ***    VITALS   Vitals:    07/18/19 0743   BP:    Pulse: 88   Resp: 20   Temp:           PAIN  9/10  Subjective report of pain matches objective signs and symptoms: No      LABORATORY DATA   Recent Results (from the past 72 hour(s))   POCT glucose    Collection Time: 07/15/19 11:31 AM   Result Value Ref Range    POCT Glucose 251 (H) 70 - 110 mg/dL   POCT glucose    Collection Time: 07/15/19  4:03 PM   Result Value Ref Range    POCT Glucose 221 (H) 70 - 110 mg/dL   POCT glucose    Collection Time: 07/15/19  8:30 PM   Result Value Ref Range    POCT Glucose 252 (H) 70 - 110 mg/dL   POCT glucose    Collection Time: 07/16/19  6:14 AM   Result Value Ref Range    POCT Glucose 175 (H) 70 - 110 mg/dL   CBC auto differential    Collection Time: 07/16/19  6:48 AM   Result Value Ref Range    WBC 11.31 3.90 - 12.70 K/uL    RBC 4.11 4.00 - 5.40 M/uL    Hemoglobin 11.5 (L) 12.0 - 16.0 g/dL    Hematocrit 35.0 (L) 37.0 - 48.5 %    Mean Corpuscular Volume 85 82 - 98 fL    Mean Corpuscular Hemoglobin 28.0 27.0 - 31.0 pg    Mean Corpuscular Hemoglobin Conc 32.9 32.0 - 36.0 g/dL    RDW 13.0 11.5 - 14.5 %    Platelets 221 150 - 350 K/uL    MPV 9.2 9.2 - 12.9 fL     Immature Granulocytes 0.5 0.0 - 0.5 %    Gran # (ANC) 10.3 (H) 1.8 - 7.7 K/uL    Immature Grans (Abs) 0.06 (H) 0.00 - 0.04 K/uL    Lymph # 0.5 (L) 1.0 - 4.8 K/uL    Mono # 0.5 0.3 - 1.0 K/uL    Eos # 0.0 0.0 - 0.5 K/uL    Baso # 0.01 0.00 - 0.20 K/uL    nRBC 0 0 /100 WBC    Gran% 91.2 (H) 38.0 - 73.0 %    Lymph% 4.0 (L) 18.0 - 48.0 %    Mono% 4.2 4.0 - 15.0 %    Eosinophil% 0.0 0.0 - 8.0 %    Basophil% 0.1 0.0 - 1.9 %    Differential Method Automated    Basic metabolic panel    Collection Time: 07/16/19  6:48 AM   Result Value Ref Range    Sodium 128 (L) 136 - 145 mmol/L    Potassium 4.7 3.5 - 5.1 mmol/L    Chloride 83 (L) 95 - 110 mmol/L    CO2 37 (H) 23 - 29 mmol/L    Glucose 141 (H) 70 - 110 mg/dL    BUN, Bld 14 8 - 23 mg/dL    Creatinine 0.6 0.5 - 1.4 mg/dL    Calcium 9.1 8.7 - 10.5 mg/dL    Anion Gap 8 8 - 16 mmol/L    eGFR if African American >60 >60 mL/min/1.73 m^2    eGFR if non African American >60 >60 mL/min/1.73 m^2   POCT TB Skin Test Read    Collection Time: 07/16/19 11:36 AM   Result Value Ref Range    TB Induration 48 - 72 hr read 0 mm   POCT glucose    Collection Time: 07/16/19 11:44 AM   Result Value Ref Range    POCT Glucose 151 (H) 70 - 110 mg/dL   POCT glucose    Collection Time: 07/16/19  4:38 PM   Result Value Ref Range    POCT Glucose 188 (H) 70 - 110 mg/dL   POCT glucose    Collection Time: 07/16/19  8:25 PM   Result Value Ref Range    POCT Glucose 167 (H) 70 - 110 mg/dL   CBC auto differential    Collection Time: 07/17/19  6:06 AM   Result Value Ref Range    WBC 8.18 3.90 - 12.70 K/uL    RBC 4.03 4.00 - 5.40 M/uL    Hemoglobin 11.2 (L) 12.0 - 16.0 g/dL    Hematocrit 35.2 (L) 37.0 - 48.5 %    Mean Corpuscular Volume 87 82 - 98 fL    Mean Corpuscular Hemoglobin 27.8 27.0 - 31.0 pg    Mean Corpuscular Hemoglobin Conc 31.8 (L) 32.0 - 36.0 g/dL    RDW 13.1 11.5 - 14.5 %    Platelets 217 150 - 350 K/uL    MPV 9.3 9.2 - 12.9 fL    Immature Granulocytes 0.4 0.0 - 0.5 %    Gran # (ANC) 6.6 1.8 - 7.7  "K/uL    Immature Grans (Abs) 0.03 0.00 - 0.04 K/uL    Lymph # 0.9 (L) 1.0 - 4.8 K/uL    Mono # 0.7 0.3 - 1.0 K/uL    Eos # 0.0 0.0 - 0.5 K/uL    Baso # 0.00 0.00 - 0.20 K/uL    nRBC 0 0 /100 WBC    Gran% 80.1 (H) 38.0 - 73.0 %    Lymph% 10.6 (L) 18.0 - 48.0 %    Mono% 8.9 4.0 - 15.0 %    Eosinophil% 0.0 0.0 - 8.0 %    Basophil% 0.0 0.0 - 1.9 %    Differential Method Automated    Basic metabolic panel    Collection Time: 07/17/19  6:06 AM   Result Value Ref Range    Sodium 129 (L) 136 - 145 mmol/L    Potassium 4.8 3.5 - 5.1 mmol/L    Chloride 85 (L) 95 - 110 mmol/L    CO2 40 (H) 23 - 29 mmol/L    Glucose 123 (H) 70 - 110 mg/dL    BUN, Bld 16 8 - 23 mg/dL    Creatinine 0.6 0.5 - 1.4 mg/dL    Calcium 9.1 8.7 - 10.5 mg/dL    Anion Gap 4 (L) 8 - 16 mmol/L    eGFR if African American >60 >60 mL/min/1.73 m^2    eGFR if non African American >60 >60 mL/min/1.73 m^2   POCT glucose    Collection Time: 07/17/19  6:42 AM   Result Value Ref Range    POCT Glucose 104 70 - 110 mg/dL   POCT glucose    Collection Time: 07/17/19 11:16 AM   Result Value Ref Range    POCT Glucose 151 (H) 70 - 110 mg/dL   POCT glucose    Collection Time: 07/17/19  4:40 PM   Result Value Ref Range    POCT Glucose 125 (H) 70 - 110 mg/dL   POCT glucose    Collection Time: 07/17/19  8:07 PM   Result Value Ref Range    POCT Glucose 107 70 - 110 mg/dL   POCT glucose    Collection Time: 07/18/19  7:04 AM   Result Value Ref Range    POCT Glucose 134 (H) 70 - 110 mg/dL      No results found for: PHENYTOIN, PHENOBARB, VALPROATE, CBMZ        CONSTITUTIONAL  General Appearance:  ; NAD    MUSCULOSKELETAL  Muscle Strength and Tone:   normal  Abnormal Involuntary Movements:   none  Gait and Station:   normal; non-ataxic    PSYCHIATRIC   Level of Consciousness: awake, alert  Orientation: p/p/t/s  Grooming:   adequate to circumstances  Psychomotor Behavior: no PMA/R  Speech: nl r/t/v/s  Language:   English fluent  Mood: "ok"  Affect: normal  Thought Process:   linear and " organized  Associations:   intact; no KATLYN  Thought Content:   denied AVH/delusions; denied HI/SI  Memory:   intact to recent and remote events  Attention:   intact to conversation; not distractible   Fund of Knowledge:   age and education appropriate  Estimate if Intelligence:   average based on work/education history, vocabulary and mental status exam  Insight:   good- seeks help, understands/accepts illness  Judgment:    good- no bx issues, compliant and cooperative        PSYCHOSOCIAL      PSYCHOSOCIAL STRESSORS   financial    FUNCTIONING RELATIONSHIPS   good support system and good relationship with children      STRENGTHS AND LIABILITIES   Strength: Patient is expressive/articulate., Strength: Patient is intelligent., Strength: Patient has positive support network., Liability: Patient is dependent., Liability: Patient lacks coping skills.      Is the patient aware of the biomedical complications associated with substance abuse and mental illness? yes    Does the patient have an Advance Directive for Mental Health treatment? no  (If yes, inform patient to bring copy.)        ASSESSMENT     IMPRESSION   Pt of normal psychiatric functioning adequate for nursing homeliving          MEDICAL DECISION MAKING        PROBLEM LIST AND MANAGEMENT PLANS    ***      PRESCRIPTION DRUG MANAGEMENT  Compliance: yes  Side Effects: no  Regimen Adjustments:   ***      DIAGNOSTIC TESTING  Labs reviewed with patient; follow up pending labs; ***    Disposition:  -SW to assist with aftercare planning and collateral  -Once stable discharge home with outpatient follow up care and/or rehab  -Continue inpatient treatment under a PEC and/or CEC for danger to self, and danger to others, and grave disability as evident by ***      Vaibhav Caban,   Psychiatry

## 2019-07-18 NOTE — ASSESSMENT & PLAN NOTE
Mos likely due to her pulmonary issues  BMP  Lab Results   Component Value Date     (L) 07/17/2019    K 4.8 07/17/2019    CL 85 (L) 07/17/2019    CO2 40 (H) 07/17/2019    BUN 16 07/17/2019    CREATININE 0.6 07/17/2019    CALCIUM 9.1 07/17/2019    ANIONGAP 4 (L) 07/17/2019    ESTGFRAFRICA >60 07/17/2019    EGFRNONAA >60 07/17/2019       Improved.  Free water restriction.

## 2019-07-19 NOTE — PLAN OF CARE
Problem: Adult Inpatient Plan of Care  Goal: Plan of Care Review  Outcome: Ongoing (interventions implemented as appropriate)  Pt admitted with COPD exacerbation. Oxygen sats mid to upper 90s on 2Lvia nasal cannula ( on 3L at home). Breath sounds diminished but clear. Infrequent non productive cough. Nebs every 4 hours. PO prednisone. Monitoring blood sugars AC&HS; covering with SSI as needed. Frequent complaints of back and left shoulder pain; relieved with lidocaine patches and zanaflex as needed. Up with minimal assist; PT consulted. Vitals stable. Sinus rhythm on telemetry.  Waiting for state approval for nursing home placement. Discussed plan of care with pt; states agreement.

## 2019-07-19 NOTE — PLAN OF CARE
Problem: Adult Inpatient Plan of Care  Goal: Plan of Care Review  Outcome: Ongoing (interventions implemented as appropriate)  Quiet hours, rested well. Vital signs stable. SR on tele monitor. Remains on 2L/NC.  Medicated with fioricet and xanaflex during the night. Awaiting nursing home placement. Denies C/O at this time. Reviewed plan of care, voiced understanding. SR up X3, call bell in reach. Instructed to call for needs or assistance.

## 2019-07-19 NOTE — PT/OT/SLP PROGRESS
Occupational Therapy   Treatment    Name: Di Deluca  MRN: [id number]  Admitting Diagnosis:  COPD exacerbation       Recommendations:     Discharge Recommendations: nursing facility, skilled  Discharge Equipment Recommendations:  grab bar, tub/shower, grab bar, toilet, shower chair, walker, rolling  Barriers to discharge:  Inaccessible home environment, Decreased caregiver support    Assessment:     Di Deluca is a 68 y.o. female with a medical diagnosis of COPD exacerbation.  She presents with generalized weakness and increased complaints on pain with UE movement. Pt with poor endurance and decreased activity tolerance. Standing balance was fair. Performance deficits affecting function are weakness, impaired endurance, impaired functional mobilty, gait instability, decreased coordination, pain, impaired fine motor, impaired joint extensibility, impaired cardiopulmonary response to activity, impaired coordination, decreased safety awareness, impaired self care skills, decreased lower extremity function, decreased ROM, impaired balance, decreased upper extremity function, abnormal tone, impaired muscle length.     Rehab Prognosis:  Good; patient would benefit from acute skilled OT services to address these deficits and reach maximum level of function.       Plan:     Patient to be seen 5 x/week to address the above listed problems via self-care/home management, therapeutic activities, therapeutic exercises, neuromuscular re-education  · Plan of Care Expires: 07/31/19  · Plan of Care Reviewed with: patient    Subjective     Pain/Comfort:  · Pain Rating 1: 7/10  · Location - Side 1: Left  · Location - Orientation 1: generalized  · Location 1: other (see comments)(left ribs and shoulder blade)  · Pain Addressed 1: Reposition  · Pain Rating Post-Intervention 1: 6/10    Objective:     Communicated with: nursing prior to session.  Patient found with bed in chair position with telemetry, oxygen upon OT entry to  room.    General Precautions: Standard, fall   Orthopedic Precautions:N/A   Braces: N/A     Occupational Performance:     Bed Mobility:    · Patient completed Rolling/Turning to Left with  stand by assistance  · Patient completed Rolling/Turning to Right with stand by assistance  · Patient completed Scooting/Bridging with stand by assistance  · Patient completed Supine to Sit with stand by assistance  · Patient completed Sit to Supine with stand by assistance     Functional Mobility/Transfers:  · Patient completed Sit <> Stand Transfer with contact guard assistance  with  hand-held assist   · Patient completed Toilet Transfer Step Transfer technique with contact guard assistance with  hand-held assist  · Functional Mobility: Decrease dynamic and static balance increasing fall risk when t/fing . Pt with poor safety awareness.    Activities of Daily Living:  · Feeding:  independence no difficulty  · Grooming: modified independence requires rest breaks due to pain in L shoulder.  · Upper Body Dressing: contact guard assistance poor safety awareness.  · Lower Body Dressing: stand by assistance poor safety awareness and increased pain.  · Toileting: contact guard assistance requries CGA due to decreased coordination and poor safety awareness when managing clothing and t/f.      [facility] 6 Click ADL: 24    Treatment & Education:  Educated on t/f safety and fall risk factors.  Reaching overhead x 20.  Lateral weight shifting from right to left x 20.  Trunk flexion and extension x 20  Sit to stands x 15  Repositioning in bed for proper body alignment and LE guiding.    Assisted with toilet t/f and hygiene.    Patient left with bed in chair position with all lines intact and call button in reachEducation:      GOALS:   Multidisciplinary Problems     Occupational Therapy Goals        Problem: Occupational Therapy Goal    Goal Priority Disciplines Outcome Interventions   Occupational Therapy Goal     OT, PT/OT     Description:   Goals to be met by: 7/31/2019     Patient will increase functional independence with ADLs by performing:    LE Dressing with [place].  Grooming while standing at sink with [place].  Toileting from toilet with [place] for hygiene and clothing management.   Bathing from  shower chair/bench with Modified [place].  Upper extremity exercise program x10 reps per handout, with assistance as needed with stable vital signs. 02 > 90 % on 2 liters.                       Time Tracking:     OT Date of Treatment: 07/18/19  OT Start Time: 0645  OT Stop Time: 0710  OT Total Time (min): 25 min    Billable Minutes:Self Care/Home Management 10 minutes  Therapeutic Exercise 15 minutes    Yulia Rodriguez OT  7/18/2019

## 2019-07-19 NOTE — PT/OT/SLP PROGRESS
Physical Therapy Treatment    Patient Name:  Di Deluca   MRN:  [id number]    Recommendations:     Discharge Recommendations:  nursing facility, skilled   Discharge Equipment Recommendations: walker, rolling, grab bar, toilet, grab bar, tub/shower, shower chair   Barriers to discharge: Inaccessible home and Decreased caregiver support    Assessment:     Di Deluca is a 68 y.o. female admitted with a medical diagnosis of COPD exacerbation.  She presents with the following impairments/functional limitations:  weakness, pain, impaired functional mobilty, impaired self care skills, impaired endurance, gait instability, impaired cardiopulmonary response to activity, decreased upper extremity function, decreased lower extremity function, decreased ROM, impaired balance. Patient seen on  Bed with oxygen at 2.5 l/m. Noted SPO2 prior to gait=97%; After gait(150 feet) =95%. No sign of respiratory distress during gait and functional activity.    Rehab Prognosis: Good; patient would benefit from acute skilled PT services to address these deficits and reach maximum level of function.    Recent Surgery: * No surgery found *      Plan:     During this hospitalization, patient to be seen 5 x/week to address the identified rehab impairments via gait training, therapeutic activities, therapeutic exercises and progress toward the following goals:    · Plan of Care Expires:  07/25/19    Subjective     Chief Complaint: Patient complain pain on left arm and not feeling good today.  Patient/Family Comments/goals: Patient wish to go to a nursing home to extend in getting better.  Pain/Comfort:  · Pain Rating 1: 7/10  · Location - Side 1: Left  · Location - Orientation 1: generalized  · Location 1: arm  · Pain Addressed 1: Reposition  · Pain Rating Post-Intervention 1: 6/10      Objective:     Communicated with patient prior to session.  Patient found HOB elevated with telemetry, oxygen upon PT entry to room.     General Precautions:  Standard, fall   Orthopedic Precautions:N/A   Braces: N/A     Functional Mobility:  · Bed Mobility:     · Rolling Left:  contact guard assistance  · Rolling Right: contact guard assistance  · Scooting: contact guard assistance  · Supine to Sit: contact guard assistance  · Sit to Supine: contact guard assistance  · Transfers:     · Sit to Stand:  stand by assistance with rolling walker  · Bed to Chair: stand by assistance with  rolling walker  using  Step Transfer  · Gait: RW Ambulation x 100 feet (twice) with SBA. Slow pace, dec myriam and dec weight shifting      AM-PAC 6 CLICK MOBILITY  Turning over in bed (including adjusting bedclothes, sheets and blankets)?: 4  Sitting down on and standing up from a chair with arms (e.g., wheelchair, bedside commode, etc.): 3  Moving from lying on back to sitting on the side of the bed?: 4  Moving to and from a bed to a chair (including a wheelchair)?: 3  Need to walk in hospital room?: 3  Climbing 3-5 steps with a railing?: 3  Basic Mobility Total Score: 20       Therapeutic Activities and Exercises:   Gait trng with RW x 100 feet(twice) with SBA;.Pt performed bilat LE exercises consisting of Active range of motion exercises x 10 reps consisting of Ankle DF, Ankle PF, side steps at sitting, LAQ and marching at sitting with pt able to tolerate activities well with no sign of fatigue.   PT discussed importance of patient's performance of Home Exercise Program (HEP) with pt verbalizing understanding.     Patient left HOB elevated with all lines intact, call button in reach, bed alarm on and Nurse Marina notified..    GOALS:   Multidisciplinary Problems     Physical Therapy Goals        Problem: Physical Therapy Goal    Goal Priority Disciplines Outcome Goal Variances Interventions   Physical Therapy Goal     PT, PT/OT Ongoing (interventions implemented as appropriate)     Description:  Goals to be met by: 7/25/19     Patient will increase functional independence with mobility  by performin. Sit to stand transfer with [place]  2. Bed to chair transfer with [place]  3. Gait  x 100 feet with Supervision.   4. Ascend/descend 17 stair with bilateral Handrails Stand-by Assistance.   5. Lower extremity exercise program x10 reps per handout, with independence                      Time Tracking:     PT Received On: 19  PT Start Time: 1346     PT Stop Time: 1416  PT Total Time (min): 30 min     Billable Minutes: Gait Training 15 and Therapeutic Activity 15    Treatment Type: Treatment  PT/PTA: PT           Ryne Cabezas, PT  2019

## 2019-07-19 NOTE — PLAN OF CARE
TREY contacted the Office of Aging and Adult Services to confirm receipt of psychiatric evaluation, H & P and most recent drug screen faxed yesterday for the Level 2 PASRR Screen. Receipt of needed documentation confirmed. Informed that the reviewing process could take 7-9 business days to complete. TREY contacted Ciara jarvis [facility] to update.     Mee Morton LMSW

## 2019-07-19 NOTE — PROGRESS NOTES
"[facility] Medicine  Progress Note    Patient Name: Di Deluca  MRN: [id number]  Patient Class: IP- Inpatient   Admission Date: 7/12/2019  Length of Stay: 7 days  Attending Physician: Lee Ku MD  Primary Care Provider: Thomas Huertas MD        Subjective:     Principal Problem:COPD exacerbation      HPI:    Di Deluca is a 68 y.o. female with PMH of severe COPD /on  Home oxygen ,   LBP, spur, chickenpox, HTN, rheumatic disease, thyroid disease parented to ER with shortness of breath.  She used to live in [place] ; moved to her friend here " got kicked out of house "  Friend reports pain med issues . Multiple ER visit >  Seems home less ; has two living daughters :[place]. Recently fell broke left arm  Seen Dr damien armendariz ; went to er for pain meds and COPD   Patient has shortness of breath, longstanding history of COPD reported this p.m.  This is not new to us, this patient has been to the ER several times in last 2 months  She had  active wheezing on ER presentation this morning  Patient is on 4 liters of oxygen at home still smokes at least 2 packs a day for years.    Placed in observation for COPD exacerbation .  She will need NH placement ; no place to live ; multiple ER visits :  She was told for NH placement 3 months ago;she declined .      Overview/Hospital Course:  7/13/19  Looks better ;keeps asking for pain meds  Again long discussion .  Shortness of breath better   Wheezing better   BIPAP ordered.  She is on rocephin , zithromax,     7/14/19  Breathing wise much better   Will need NH placement ; home less   Will taper steroids  Continue nebs and antibiotics     7/15/19  She is on day 4 azithromycin and rocephin. She has reduced medrol 40mg IV every 8hr. Using O2 (has 3L at home) here on 2L pox 95%. No fever. WBC 12324. Still smoker. nicotine patch ordered  Asking about her Fioricet for chronic pain as this is what she is using at home long term  She " also reports that she has left short arm cast due to fall. Refused PT yesterday. Had it placed 1 week ago.     7/16/19 NAD overnight. 2LNC - O2 sat 98-99%  WBC 14.76> 11.31, afebrile   She is on day 5/5 azithromycin and rocephin. Getting prednisone 40mg bid; taper to 10mg bid then daily for home  Na+ 124>128 after 1LNS  Pt reports feeling is much better and breathing is much better.   She needs NH placement ; homeless ;with respiratory issues ;  Awaiting for nursing approval; level II screening in process .    7/17/19 NAD , Afebrile, WBC 8.18 , O2 98% on 2LNC   ABX completed for COPD exac, Prednisone 10mg bid;  this am   Na 129+ ,   Waiting on required level II forms for possible nursing home admission  7/18/19  Remains stable. Planned for psyche consult today for clearance  for Nursing home admission  pt able to ambulate without assistive device x 40 ft with CGA/SBA while on supplemental oxygen via NC.  - O 2 sat 96-97%  Getting prednisone 10mg bid; -134   C/O left ear drainage, requesting lidocaine patches for left shoulder pain and low back pain. C/o inability to sleep due to pain.   Notes she broke left arm 13 days ago trying to break fall. States she needs to wear cast for 6 weeks. Again asking for pain Rx    7/19/19  Has been stable; awaiting  state approval for nursing home.   VSS. Afebrile. 2LNC - O2 sat 96-98%.   C/o vaginal yeast symptoms       Review of Systems   Constitutional: Negative for chills and fever.   HENT: Positive for ear discharge. Negative for congestion, hearing loss, sinus pressure and sore throat.    Eyes: Negative for photophobia.   Respiratory: Negative for apnea, cough, choking, chest tightness, shortness of breath and wheezing.    Cardiovascular: Negative for chest pain and palpitations.   Gastrointestinal: Negative for blood in stool, nausea and vomiting.   Genitourinary: Positive for vaginal discharge. Negative for dysuria and hematuria.   Musculoskeletal: Positive for  arthralgias and back pain. Negative for myalgias.        Left arm in cast   Chronic pain issues    Skin: Negative for pallor.   Neurological: Negative for dizziness and numbness.   Hematological: Does not bruise/bleed easily.   Psychiatric/Behavioral: Positive for sleep disturbance. Negative for confusion and suicidal ideas. The patient is not nervous/anxious.      Objective:     Vital Signs (Most Recent):  Temp: 97.3 °F (36.3 °C) (07/19/19 0710)  Pulse: 79 (07/19/19 0710)  Resp: 19 (07/19/19 0710)  BP: (!) 145/79 (07/19/19 0710)  SpO2: 98 % (07/19/19 0710) Vital Signs (24h Range):  Temp:  [97.3 °F (36.3 °C)-98.9 °F (37.2 °C)] 97.3 °F (36.3 °C)  Pulse:  [] 79  Resp:  [17-19] 19  SpO2:  [94 %-99 %] 98 %  BP: (120-154)/(62-79) 145/79     Weight: 47.7 kg (105 lb 2.6 oz)  Body mass index is 18.05 kg/m².    Intake/Output Summary (Last 24 hours) at 7/19/2019 0757  Last data filed at 7/18/2019 2135  Gross per 24 hour   Intake --   Output 5 ml   Net -5 ml      Physical Exam   Constitutional: She is oriented to person, place, and time. She appears well-developed and well-nourished.   HENT:   Head: Normocephalic and atraumatic.   Nose: Nose normal.   Mouth/Throat: Oropharynx is clear and moist.   Bilateral thick mucous to EAC   Eyes: Pupils are equal, round, and reactive to light. Conjunctivae and EOM are normal.   Neck: Normal range of motion. Neck supple. No thyromegaly present.   Cardiovascular: Normal rate, regular rhythm, normal heart sounds and intact distal pulses.   Pulmonary/Chest: Effort normal. No respiratory distress. She has no wheezes.   02 in place   Abdominal: Soft. Bowel sounds are normal.   Musculoskeletal: Normal range of motion. She exhibits no edema.   Left arm in cast    Neurological: She is alert and oriented to person, place, and time. She has normal reflexes.   Skin: Skin is warm and dry.   Nursing note and vitals reviewed.    Lab Results   Component Value Date    WBC 8.18 07/17/2019    HGB 11.2  (L) 07/17/2019    HCT 35.2 (L) 07/17/2019    MCV 87 07/17/2019     07/17/2019     BMP  Lab Results   Component Value Date     (L) 07/17/2019    K 4.8 07/17/2019    CL 85 (L) 07/17/2019    CO2 40 (H) 07/17/2019    BUN 16 07/17/2019    CREATININE 0.6 07/17/2019    CALCIUM 9.1 07/17/2019    ANIONGAP 4 (L) 07/17/2019    ESTGFRAFRICA >60 07/17/2019    EGFRNONAA >60 07/17/2019     Lab Results   Component Value Date    ALT 17 07/13/2019    AST 21 07/13/2019    ALKPHOS 65 07/13/2019    BILITOT 0.4 07/13/2019     Lactic acid 0.6  Lab Results   Component Value Date    DDIMER 1.15 (H) 07/12/2019     Lab Results   Component Value Date    INR 1.0 07/12/2019    INR 0.9 06/14/2019    INR 1.0 06/14/2017     BNP  Recent Labs   Lab 07/12/19  1018   *     Recent Labs   Lab 07/12/19  1018     100   CPKMB 5.1   TROPONINI 0.010   MB 5.1*     Flu negative .    Blood cultures NGTD x 2    CXR Chronic lung changes are noted bilaterally.  There is hyperlucency of the right lung likely related to underlying bullous change.  The heart is normal in size.  Calcified atheromatous disease affects the aorta.  Age-appropriate degenerative changes affect the skeleton.    EKG Baseline wander Normal sinus rhythm  Right axis deviation  T wave abnormality, consider anterior ischemia or Persistent Normal  variant, juvenile T waves  Abnormal ECG  When compared with ECG of 06-JUL-2019 01:08,  No significant change was found      Assessment/Plan:      * COPD exacerbation  IV steroids; taper from 125 to 40 mg ..    7/15: prednisone 40mg PO BID.    IV antibiotics x 1 more day  Nebs   Pulmonary consulted .    7/17 Prednisone 10mg bid; Abx completed Improving and anticipate discharge soon. Awaiting NH placement      Acute respiratory failure with hypoxia and hypercarbia        Homeless single person  Will need NH placement she is not sure if she wants this.  working with her. If not waiting on nursing home may be able tod/c  in am. Will need to find where she is going. She is working on that today   PPD ordered for NH placement .  Psyche eval this am per Dr. Antonio, awaiting approval for nursing home  7/19 state approval pending     Chronic narcotic use  Avoid narcotics.  tizinidine ordered   Fioricet ordered .  Requesting lidocaine patch     Closed nondisplaced fracture of styloid process of left ulna with routine healing  S/p cast   .PT\  F/u outpt.    Closed Colles' fracture of left radius with routine healing  S/p cast by orthopedics.  Will f/u oupt  PT.        Chronic obstructive pulmonary disease  Nebs  For now   Needs to stop smoking .      Chronic respiratory failure with hypoxia and hypercapnia  Continue nebs.  Checked ABG    BIPAP-- she is not using  Cont O2 via NC, on 2L and prescribed up to 3L for home use  7/16 Stable on 2LNC          Tobacco abuse  Nicotine patch .  Counseled again .      Essential hypertension  Continue amlodipine       Hyponatremia  Mos likely due to her pulmonary issues  BMP  Lab Results   Component Value Date     (L) 07/17/2019    K 4.8 07/17/2019    CL 85 (L) 07/17/2019    CO2 40 (H) 07/17/2019    BUN 16 07/17/2019    CREATININE 0.6 07/17/2019    CALCIUM 9.1 07/17/2019    ANIONGAP 4 (L) 07/17/2019    ESTGFRAFRICA >60 07/17/2019    EGFRNONAA >60 07/17/2019       Improved.  Free water restriction.      VTE Risk Mitigation (From admission, onward)        Ordered     IP VTE HIGH RISK PATIENT  Once      07/12/19 1224     Place sequential compression device  Until discontinued      07/12/19 1224                Lee Ku MD  Department of Hospital Medicine   [facility]

## 2019-07-19 NOTE — PLAN OF CARE
Problem: Adult Inpatient Plan of Care  Goal: Plan of Care Review  Outcome: Ongoing (interventions implemented as appropriate)  Patient doing well on current therapy.  No adverse reactions or acute distress noted at this time.

## 2019-07-19 NOTE — SUBJECTIVE & OBJECTIVE
Review of Systems   Constitutional: Negative for chills and fever.   HENT: Positive for ear discharge. Negative for congestion, hearing loss, sinus pressure and sore throat.    Eyes: Negative for photophobia.   Respiratory: Negative for apnea, cough, choking, chest tightness, shortness of breath and wheezing.    Cardiovascular: Negative for chest pain and palpitations.   Gastrointestinal: Negative for blood in stool, nausea and vomiting.   Genitourinary: Positive for vaginal discharge. Negative for dysuria and hematuria.   Musculoskeletal: Positive for arthralgias and back pain. Negative for myalgias.        Left arm in cast   Chronic pain issues    Skin: Negative for pallor.   Neurological: Negative for dizziness and numbness.   Hematological: Does not bruise/bleed easily.   Psychiatric/Behavioral: Positive for sleep disturbance. Negative for confusion and suicidal ideas. The patient is not nervous/anxious.      Objective:     Vital Signs (Most Recent):  Temp: 97.3 °F (36.3 °C) (07/19/19 0710)  Pulse: 79 (07/19/19 0710)  Resp: 19 (07/19/19 0710)  BP: (!) 145/79 (07/19/19 0710)  SpO2: 98 % (07/19/19 0710) Vital Signs (24h Range):  Temp:  [97.3 °F (36.3 °C)-98.9 °F (37.2 °C)] 97.3 °F (36.3 °C)  Pulse:  [] 79  Resp:  [17-19] 19  SpO2:  [94 %-99 %] 98 %  BP: (120-154)/(62-79) 145/79     Weight: 47.7 kg (105 lb 2.6 oz)  Body mass index is 18.05 kg/m².    Intake/Output Summary (Last 24 hours) at 7/19/2019 0756  Last data filed at 7/18/2019 2135  Gross per 24 hour   Intake --   Output 5 ml   Net -5 ml      Physical Exam   Constitutional: She is oriented to person, place, and time. She appears well-developed and well-nourished.   HENT:   Head: Normocephalic and atraumatic.   Nose: Nose normal.   Mouth/Throat: Oropharynx is clear and moist.   Bilateral thick mucous to EAC   Eyes: Pupils are equal, round, and reactive to light. Conjunctivae and EOM are normal.   Neck: Normal range of motion. Neck supple. No thyromegaly  present.   Cardiovascular: Normal rate, regular rhythm, normal heart sounds and intact distal pulses.   Pulmonary/Chest: Effort normal. No respiratory distress. She has no wheezes.   02 in place   Abdominal: Soft. Bowel sounds are normal.   Musculoskeletal: Normal range of motion. She exhibits no edema.   Left arm in cast    Neurological: She is alert and oriented to person, place, and time. She has normal reflexes.   Skin: Skin is warm and dry.   Nursing note and vitals reviewed.    Lab Results   Component Value Date    WBC 8.18 07/17/2019    HGB 11.2 (L) 07/17/2019    HCT 35.2 (L) 07/17/2019    MCV 87 07/17/2019     07/17/2019     BMP  Lab Results   Component Value Date     (L) 07/17/2019    K 4.8 07/17/2019    CL 85 (L) 07/17/2019    CO2 40 (H) 07/17/2019    BUN 16 07/17/2019    CREATININE 0.6 07/17/2019    CALCIUM 9.1 07/17/2019    ANIONGAP 4 (L) 07/17/2019    ESTGFRAFRICA >60 07/17/2019    EGFRNONAA >60 07/17/2019     Lab Results   Component Value Date    ALT 17 07/13/2019    AST 21 07/13/2019    ALKPHOS 65 07/13/2019    BILITOT 0.4 07/13/2019     Lactic acid 0.6  Lab Results   Component Value Date    DDIMER 1.15 (H) 07/12/2019     Lab Results   Component Value Date    INR 1.0 07/12/2019    INR 0.9 06/14/2019    INR 1.0 06/14/2017     BNP  Recent Labs   Lab 07/12/19  1018   *     Recent Labs   Lab 07/12/19  1018     100   CPKMB 5.1   TROPONINI 0.010   MB 5.1*     Flu negative .    Blood cultures NGTD x 2    CXR Chronic lung changes are noted bilaterally.  There is hyperlucency of the right lung likely related to underlying bullous change.  The heart is normal in size.  Calcified atheromatous disease affects the aorta.  Age-appropriate degenerative changes affect the skeleton.    EKG Baseline wander Normal sinus rhythm  Right axis deviation  T wave abnormality, consider anterior ischemia or Persistent Normal  variant, juvenile T waves  Abnormal ECG  When compared with ECG of  06-JUL-2019 01:08,  No significant change was found

## 2019-07-19 NOTE — PLAN OF CARE
07/19/19 1444   Discharge Reassessment   Assessment Type Discharge Planning Reassessment   Post-Acute Status   Post-Acute Placement Status Pending State Certification   Discharge Delays (!) Other  (Level 2 screen needs to be done)         Patient will remain for continued treatment today. No needs or concerns voiced at this time. CM will continue to follow and assist as needed.

## 2019-07-19 NOTE — NURSING
Bedside report complete. Pt in bed with no C/O at this time. SR up X3, call bell in reach. Instructed to call for needs or assistance.

## 2019-07-19 NOTE — PT/OT/SLP PROGRESS
Occupational Therapy   Treatment    Name: Di Deluca  MRN: [id number]  Admitting Diagnosis:  COPD exacerbation       Recommendations:     Discharge Recommendations: nursing facility, skilled  Discharge Equipment Recommendations:  walker, rolling, grab bar, toilet, grab bar, tub/shower, shower chair, raised toilet(oxygen)  Barriers to discharge:  Inaccessible home environment, Decreased caregiver support    Assessment:     Di Deluca is a 68 y.o. female with a medical diagnosis of COPD exacerbation.  She presents with increased pain and increased fatigue today. Pt would not sit edge of bed and requested that therapy be done in bed sitting up. Decreased UE strength due to increased pain with movement. Poor safety awareness, pt does not manipulate surrounding safety. SOB with energy expenditure. Performance deficits affecting function are impaired endurance, weakness, impaired self care skills, gait instability, decreased coordination, decreased lower extremity function, pain, decreased safety awareness, decreased upper extremity function, impaired balance, impaired functional mobilty, abnormal tone, impaired coordination, impaired cardiopulmonary response to activity, impaired fine motor, impaired joint extensibility, orthopedic precautions, impaired muscle length, decreased ROM.     Rehab Prognosis:  Fair; patient would benefit from acute skilled OT services to address these deficits and reach maximum level of function.       Plan:     Patient to be seen 5 x/week to address the above listed problems via self-care/home management, therapeutic activities, therapeutic exercises, neuromuscular re-education  · Plan of Care Expires: 07/31/19  · Plan of Care Reviewed with: patient    Subjective     Pain/Comfort:  · Pain Rating 1: 9/10  · Location - Orientation 1: generalized  · Location 1: (left shoulder)  · Pain Addressed 1: Reposition, Cessation of Activity  · Pain Rating Post-Intervention 1: 5/10    Objective:      Communicated with: nursing prior to session.  Patient found with bed in chair position with oxygen, telemetry upon OT entry to room.    General Precautions: Standard, fall   Orthopedic Precautions:N/A   Braces: N/A     Occupational Performance:     Bed Mobility:    · Patient completed Rolling/Turning to Left with  stand by assistance  · Patient completed Rolling/Turning to Right with stand by assistance  · Patient completed Scooting/Bridging with stand by assistance  · Patient completed Supine to Sit with stand by assistance     Functional Mobility/Transfers:  · Patient completed Sit <> Stand Transfer with contact guard assistance  with  hand-held assist   · Patient completed Toilet Transfer Step Transfer technique with contact guard assistance with  hand-held assist  · Functional Mobility: Poor safety awareness and decreased dynamic balance when moving outside COG increasing fall risk during fxnal mobility.    Activities of Daily Living:  · Feeding:  independence n/a  · Grooming: modified independence requires rest breaks due to pain.  · Bathing: minimum assistance due to poor endurance.  · Upper Body Dressing: contact guard assistance requires extended time due to poor endurance.  · Lower Body Dressing: minimum assistance poor endurance and decreased trunk control increasing need for assistance manipulating clothing.  · Toileting: minimum assistance requires assistance due to poor endurance and difficulty manipulating clothing during toileting.      [facility] 6 Click ADL: 22    Treatment & Education:  Performed trunk mobility/ forward flexion 2 sets x 15 reps  Shoulder flexion B UE x 20 / each arm  Shoulder adduction AROM x 20 both arms  Shoulder abduction AROM x 20 both arms  Rolling left and right x 15 both ways  Scooting/ repositioning in bed with assistance of therapist    Educated on safety when transferring. Recommended that pt not perform transfers without assistance due to decreased strength and  endurance.    Patient left with bed in chair position with all lines intact, call button in reach and bed alarm onEducation:      GOALS:   Multidisciplinary Problems     Occupational Therapy Goals        Problem: Occupational Therapy Goal    Goal Priority Disciplines Outcome Interventions   Occupational Therapy Goal     OT, PT/OT     Description:  Goals to be met by: 7/31/2019     Patient will increase functional independence with ADLs by performing:    LE Dressing with [place].  Grooming while standing at sink with [place].  Toileting from toilet with [place] for hygiene and clothing management.   Bathing from  shower chair/bench with Modified [place].  Upper extremity exercise program x10 reps per handout, with assistance as needed with stable vital signs. 02 > 90 % on 2 liters.                       Time Tracking:     OT Date of Treatment: 07/19/19  OT Start Time: 0445  OT Stop Time: 0500  OT Total Time (min): 15 min    Billable Minutes:Therapeutic Exercise 15 minutes    Yulia Rodriguez OT  7/19/2019

## 2019-07-20 NOTE — NURSING
Bedside report complete. Resting in bed, denies C/O at this time. SR up X3, call bell in reach. Instructed to call for needs or assistance.

## 2019-07-20 NOTE — PT/OT/SLP PROGRESS
Physical Therapy Treatment    Patient Name:  Di Deluca   MRN:  [id number]    Recommendations:     Discharge Recommendations:  nursing facility, skilled   Discharge Equipment Recommendations: walker, rolling   Barriers to discharge: Decreased caregiver support    Assessment:     Di Deluca is a 68 y.o. female admitted with a medical diagnosis of COPD exacerbation.  She presents with the following impairments/functional limitations:  impaired balance, impaired endurance, impaired functional mobilty, gait instability, decreased lower extremity function, decreased upper extremity function.  Pt ambulated approximately 100ft x2 trials with supplemental O2 2.5 L/M.  Pt with very slow myriam and short step length requiring multiple standing rest breaks.  .    Rehab Prognosis: Fair; patient would benefit from acute skilled PT services to address these deficits and reach maximum level of function.    Recent Surgery: * No surgery found *      Plan:     During this hospitalization, patient to be seen 5 x/week to address the identified rehab impairments via gait training, therapeutic activities, therapeutic exercises and progress toward the following goals:    · Plan of Care Expires:  07/25/19    Subjective     Chief Complaint: fatigue and LUE discomfort  Patient/Family Comments/goals: pt planning to go to a SNF as she does not have much family support.  Pain/Comfort:  · Pain Rating 1: 5/10  · Location - Side 1: Left  · Location - Orientation 1: generalized  · Location 1: arm  · Pain Addressed 1: Pre-medicate for activity  · Pain Rating Post-Intervention 1: 5/10      Objective:     Communicated with RN prior to session.  Patient found up in chair with telemetry, oxygen upon PT entry to room.     General Precautions: Standard, fall   Orthopedic Precautions:N/A   Braces: N/A     Functional Mobility:  · Bed Mobility:     · Sit to Supine: stand by assistance  · Transfers:     · Sit to Stand:  stand by assistance with rolling  walker  · Gait: 100ft x2 trials on 2.5 L supplemental O2.  Pt with very slow gait speed and very short step length requiring multiple standing rest breaks.      AM-PAC 6 CLICK MOBILITY  Turning over in bed (including adjusting bedclothes, sheets and blankets)?: 4  Sitting down on and standing up from a chair with arms (e.g., wheelchair, bedside commode, etc.): 3  Moving from lying on back to sitting on the side of the bed?: 4  Moving to and from a bed to a chair (including a wheelchair)?: 3  Need to walk in hospital room?: 3  Climbing 3-5 steps with a railing?: 3  Basic Mobility Total Score: 20       Therapeutic Activities and Exercises:   pt educated on benefit of regular mobiltiy with nursing staff    Patient left supine with all lines intact and call button in reach..    GOALS:   Multidisciplinary Problems     Physical Therapy Goals        Problem: Physical Therapy Goal    Goal Priority Disciplines Outcome Goal Variances Interventions   Physical Therapy Goal     PT, PT/OT Ongoing (interventions implemented as appropriate)     Description:  Goals to be met by: 19     Patient will increase functional independence with mobility by performin. Sit to stand transfer with [place]  2. Bed to chair transfer with [place]  3. Gait  x 100 feet with Supervision.   4. Ascend/descend 17 stair with bilateral Handrails Stand-by Assistance.   5. Lower extremity exercise program x10 reps per handout, with independence                      Time Tracking:     PT Received On: 19  PT Start Time: 1035     PT Stop Time: 1055  PT Total Time (min): 20 min     Billable Minutes: Gait Training 20    Treatment Type: Treatment  PT/PTA: PT           Adrian Bosch, PT  2019

## 2019-07-20 NOTE — SUBJECTIVE & OBJECTIVE
Review of Systems   Constitutional: Negative for chills and fever.   HENT: Negative for congestion, ear discharge, hearing loss, sinus pressure and sore throat.    Eyes: Negative for photophobia.   Respiratory: Negative for apnea, cough, choking, chest tightness, shortness of breath and wheezing.    Cardiovascular: Negative for chest pain and palpitations.   Gastrointestinal: Negative for blood in stool, nausea and vomiting.   Genitourinary: Negative for dysuria, hematuria and vaginal discharge.   Musculoskeletal: Positive for arthralgias and back pain. Negative for myalgias.        Left arm in cast   Chronic pain issues    Skin: Negative for pallor.   Neurological: Negative for dizziness and numbness.   Hematological: Does not bruise/bleed easily.   Psychiatric/Behavioral: Positive for sleep disturbance. Negative for confusion and suicidal ideas. The patient is not nervous/anxious.      Objective:     Vital Signs (Most Recent):  Temp: 97.7 °F (36.5 °C) (07/20/19 0705)  Pulse: 98 (07/20/19 1025)  Resp: 18 (07/20/19 0713)  BP: 117/72 (07/20/19 0705)  SpO2: 100 % (07/20/19 0713) Vital Signs (24h Range):  Temp:  [96.7 °F (35.9 °C)-98.6 °F (37 °C)] 97.7 °F (36.5 °C)  Pulse:  [] 98  Resp:  [18-20] 18  SpO2:  [94 %-100 %] 100 %  BP: (103-133)/(59-74) 117/72     Weight: 47.7 kg (105 lb 2.6 oz)  Body mass index is 18.05 kg/m².    Intake/Output Summary (Last 24 hours) at 7/20/2019 1113  Last data filed at 7/19/2019 1700  Gross per 24 hour   Intake 240 ml   Output --   Net 240 ml      Physical Exam   Constitutional: She is oriented to person, place, and time. She appears well-developed and well-nourished.   HENT:   Head: Normocephalic and atraumatic.   Bilateral thick mucous to EAC   Cardiovascular: Normal rate, regular rhythm, normal heart sounds and intact distal pulses. Exam reveals no gallop and no friction rub.   No murmur heard.  Pulmonary/Chest: Effort normal. No respiratory distress. She has no wheezes.   02 in  place.  Decreased BS on the right   Abdominal: Soft. Bowel sounds are normal.   Musculoskeletal: Normal range of motion. She exhibits tenderness. She exhibits no edema.   Left arm in cast    Neurological: She is alert and oriented to person, place, and time. She has normal reflexes. No cranial nerve deficit.   Skin: Skin is warm and dry.   Psychiatric: She has a normal mood and affect. Her behavior is normal. Judgment and thought content normal.   Nursing note and vitals reviewed.    Lab Results   Component Value Date    WBC 8.18 07/17/2019    HGB 11.2 (L) 07/17/2019    HCT 35.2 (L) 07/17/2019    MCV 87 07/17/2019     07/17/2019     BMP  Lab Results   Component Value Date     (L) 07/17/2019    K 4.8 07/17/2019    CL 85 (L) 07/17/2019    CO2 40 (H) 07/17/2019    BUN 16 07/17/2019    CREATININE 0.6 07/17/2019    CALCIUM 9.1 07/17/2019    ANIONGAP 4 (L) 07/17/2019    ESTGFRAFRICA >60 07/17/2019    EGFRNONAA >60 07/17/2019     Lab Results   Component Value Date    ALT 17 07/13/2019    AST 21 07/13/2019    ALKPHOS 65 07/13/2019    BILITOT 0.4 07/13/2019     Lactic acid 0.6  Lab Results   Component Value Date    DDIMER 1.15 (H) 07/12/2019     Lab Results   Component Value Date    INR 1.0 07/12/2019    INR 0.9 06/14/2019    INR 1.0 06/14/2017     BNP  No results for input(s): BNP, BNPTRIAGEBLO in the last 168 hours.  No results for input(s): CPK, CPKMB, TROPONINI, MB in the last 168 hours.  Flu negative .    Blood cultures NGTD x 2    CXR Chronic lung changes are noted bilaterally.  There is hyperlucency of the right lung likely related to underlying bullous change.  The heart is normal in size.  Calcified atheromatous disease affects the aorta.  Age-appropriate degenerative changes affect the skeleton.    EKG Baseline wander Normal sinus rhythm  Right axis deviation  T wave abnormality, consider anterior ischemia or Persistent Normal  variant, juvenile T waves  Abnormal ECG  When compared with ECG of  06-JUL-2019 01:08,  No significant change was found

## 2019-07-20 NOTE — PROGRESS NOTES
"[facility] Hospital Medicine  Progress Note    Patient Name: Di Deluca  MRN: [id number]  Patient Class: IP- Inpatient   Admission Date: 7/12/2019  Length of Stay: 8 days  Attending Physician: Lee Ku MD  Primary Care Provider: Thomas Huertas MD        Subjective:     Principal Problem:COPD exacerbation      HPI:    Di Deluca is a 68 y.o. female with PMH of severe COPD /on  Home oxygen ,   LBP, spur, chickenpox, HTN, rheumatic disease, thyroid disease parented to ER with shortness of breath.  She used to live in [place] ; moved to her friend here " got kicked out of house "  Friend reports pain med issues . Multiple ER visit >  Seems home less ; has two living daughters :[place]. Recently fell broke left arm  Seen Dr damien armendariz ; went to er for pain meds and COPD   Patient has shortness of breath, longstanding history of COPD reported this p.m.  This is not new to us, this patient has been to the ER several times in last 2 months  She had  active wheezing on ER presentation this morning  Patient is on 4 liters of oxygen at home still smokes at least 2 packs a day for years.    Placed in observation for COPD exacerbation .  She will need NH placement ; no place to live ; multiple ER visits :  She was told for NH placement 3 months ago;she declined .      Overview/Hospital Course:  7/13/19  Looks better ;keeps asking for pain meds  Again long discussion .  Shortness of breath better   Wheezing better   BIPAP ordered.  She is on rocephin , zithromax,     7/14/19  Breathing wise much better   Will need NH placement ; home less   Will taper steroids  Continue nebs and antibiotics     7/15/19  She is on day 4 azithromycin and rocephin. She has reduced medrol 40mg IV every 8hr. Using O2 (has 3L at home) here on 2L pox 95%. No fever. WBC 41674. Still smoker. nicotine patch ordered  Asking about her Fioricet for chronic pain as this is what she is using at home long term  She " also reports that she has left short arm cast due to fall. Refused PT yesterday. Had it placed 1 week ago.     7/16/19 NAD overnight. 2LNC - O2 sat 98-99%  WBC 14.76> 11.31, afebrile   She is on day 5/5 azithromycin and rocephin. Getting prednisone 40mg bid; taper to 10mg bid then daily for home  Na+ 124>128 after 1LNS  Pt reports feeling is much better and breathing is much better.   She needs NH placement ; homeless ;with respiratory issues ;  Awaiting for nursing approval; level II screening in process .    7/17/19 NAD , Afebrile, WBC 8.18 , O2 98% on 2LNC   ABX completed for COPD exac, Prednisone 10mg bid;  this am   Na 129+ ,   Waiting on required level II forms for possible nursing home admission  7/18/19  Remains stable. Planned for psyche consult today for clearance  for Nursing home admission  pt able to ambulate without assistive device x 40 ft with CGA/SBA while on supplemental oxygen via NC.  - O 2 sat 96-97%  Getting prednisone 10mg bid; -134   C/O left ear drainage, requesting lidocaine patches for left shoulder pain and low back pain. C/o inability to sleep due to pain.   Notes she broke left arm 13 days ago trying to break fall. States she needs to wear cast for 6 weeks. Again asking for pain Rx    7/19/19  Has been stable; awaiting  state approval for nursing home.   VSS. Afebrile. 2LNC - O2 sat 96-98%.   C/o vaginal yeast symptoms     7/20/19  Patient tolerating physical therapy.  COPD symptoms under control.  Lidocaine patches seem to be helping her pain.    Review of Systems   Constitutional: Negative for chills and fever.   HENT: Negative for congestion, ear discharge, hearing loss, sinus pressure and sore throat.    Eyes: Negative for photophobia.   Respiratory: Negative for apnea, cough, choking, chest tightness, shortness of breath and wheezing.    Cardiovascular: Negative for chest pain and palpitations.   Gastrointestinal: Negative for blood in stool, nausea and vomiting.    Genitourinary: Negative for dysuria, hematuria and vaginal discharge.   Musculoskeletal: Positive for arthralgias and back pain. Negative for myalgias.        Left arm in cast   Chronic pain issues    Skin: Negative for pallor.   Neurological: Negative for dizziness and numbness.   Hematological: Does not bruise/bleed easily.   Psychiatric/Behavioral: Positive for sleep disturbance. Negative for confusion and suicidal ideas. The patient is not nervous/anxious.      Objective:     Vital Signs (Most Recent):  Temp: 97.7 °F (36.5 °C) (07/20/19 0705)  Pulse: 98 (07/20/19 1025)  Resp: 18 (07/20/19 0713)  BP: 117/72 (07/20/19 0705)  SpO2: 100 % (07/20/19 0713) Vital Signs (24h Range):  Temp:  [96.7 °F (35.9 °C)-98.6 °F (37 °C)] 97.7 °F (36.5 °C)  Pulse:  [] 98  Resp:  [18-20] 18  SpO2:  [94 %-100 %] 100 %  BP: (103-133)/(59-74) 117/72     Weight: 47.7 kg (105 lb 2.6 oz)  Body mass index is 18.05 kg/m².    Intake/Output Summary (Last 24 hours) at 7/20/2019 1113  Last data filed at 7/19/2019 1700  Gross per 24 hour   Intake 240 ml   Output --   Net 240 ml      Physical Exam   Constitutional: She is oriented to person, place, and time. She appears well-developed and well-nourished.   HENT:   Head: Normocephalic and atraumatic.   Bilateral thick mucous to EAC   Cardiovascular: Normal rate, regular rhythm, normal heart sounds and intact distal pulses. Exam reveals no gallop and no friction rub.   No murmur heard.  Pulmonary/Chest: Effort normal. No respiratory distress. She has no wheezes.   02 in place.  Decreased BS on the right   Abdominal: Soft. Bowel sounds are normal.   Musculoskeletal: Normal range of motion. She exhibits tenderness. She exhibits no edema.   Left arm in cast    Neurological: She is alert and oriented to person, place, and time. She has normal reflexes. No cranial nerve deficit.   Skin: Skin is warm and dry.   Psychiatric: She has a normal mood and affect. Her behavior is normal. Judgment and  thought content normal.   Nursing note and vitals reviewed.    Lab Results   Component Value Date    WBC 8.18 07/17/2019    HGB 11.2 (L) 07/17/2019    HCT 35.2 (L) 07/17/2019    MCV 87 07/17/2019     07/17/2019     BMP  Lab Results   Component Value Date     (L) 07/17/2019    K 4.8 07/17/2019    CL 85 (L) 07/17/2019    CO2 40 (H) 07/17/2019    BUN 16 07/17/2019    CREATININE 0.6 07/17/2019    CALCIUM 9.1 07/17/2019    ANIONGAP 4 (L) 07/17/2019    ESTGFRAFRICA >60 07/17/2019    EGFRNONAA >60 07/17/2019     Lab Results   Component Value Date    ALT 17 07/13/2019    AST 21 07/13/2019    ALKPHOS 65 07/13/2019    BILITOT 0.4 07/13/2019     Lactic acid 0.6  Lab Results   Component Value Date    DDIMER 1.15 (H) 07/12/2019     Lab Results   Component Value Date    INR 1.0 07/12/2019    INR 0.9 06/14/2019    INR 1.0 06/14/2017     BNP  No results for input(s): BNP, BNPTRIAGEBLO in the last 168 hours.  No results for input(s): CPK, CPKMB, TROPONINI, MB in the last 168 hours.  Flu negative .    Blood cultures NGTD x 2    CXR Chronic lung changes are noted bilaterally.  There is hyperlucency of the right lung likely related to underlying bullous change.  The heart is normal in size.  Calcified atheromatous disease affects the aorta.  Age-appropriate degenerative changes affect the skeleton.    EKG Baseline wander Normal sinus rhythm  Right axis deviation  T wave abnormality, consider anterior ischemia or Persistent Normal  variant, juvenile T waves  Abnormal ECG  When compared with ECG of 06-JUL-2019 01:08,  No significant change was found      Assessment/Plan:      * COPD exacerbation  IV steroids; taper from 125 to 40 mg ..    7/15: prednisone 40mg PO BID.    IV antibiotics x 1 more day  Nebs   Pulmonary consulted .    7/17 Prednisone 10mg bid; Abx completed Improving and anticipate discharge soon. Awaiting NH placement    7/20 IV sites have been lost.  No need to restart an IV.  Telemetry will be discontinued.   Continue DuoNeb treatments every 6 hr.  Continue prednisone 10 mg twice daily.      Acute respiratory failure with hypoxia and hypercarbia        Homeless single person  Will need NH placement she is not sure if she wants this.  working with her. If not waiting on nursing home may be able tod/c in am. Will need to find where she is going. She is working on that today   PPD ordered for NH placement .  Psyche eval this am per Dr. Antonio, awaiting approval for nursing home  7/19 state approval pending     Chronic narcotic use  Avoid narcotics.  tizinidine ordered   Fioricet ordered .  Requesting lidocaine patch     Closed nondisplaced fracture of styloid process of left ulna with routine healing  S/p cast   .PT\  F/u outpt.    Closed Colles' fracture of left radius with routine healing  S/p cast by orthopedics.  Will f/u oupt  PT.        Chronic obstructive pulmonary disease  Nebs  For now   Needs to stop smoking .      Chronic respiratory failure with hypoxia and hypercapnia  Continue nebs.  Checked ABG   Recommend BIPAP-- she is not using  Stable on 2LNC          Tobacco abuse  Nicotine patch .  Counseled again .      Essential hypertension  Continue amlodipine       Hyponatremia  Mos likely due to her pulmonary issues  BMP  Lab Results   Component Value Date     (L) 07/17/2019    K 4.8 07/17/2019    CL 85 (L) 07/17/2019    CO2 40 (H) 07/17/2019    BUN 16 07/17/2019    CREATININE 0.6 07/17/2019    CALCIUM 9.1 07/17/2019    ANIONGAP 4 (L) 07/17/2019    ESTGFRAFRICA >60 07/17/2019    EGFRNONAA >60 07/17/2019       Improved.  Free water restriction.      VTE Risk Mitigation (From admission, onward)        Ordered     IP VTE HIGH RISK PATIENT  Once      07/12/19 1224     Place sequential compression device  Until discontinued      07/12/19 1224                Lee Ku MD  Department of Hospital Medicine   [facility]

## 2019-07-20 NOTE — PLAN OF CARE
Problem: Adult Inpatient Plan of Care  Goal: Plan of Care Review  Outcome: Ongoing (interventions implemented as appropriate)  Quiet hours, rested well. Medicated last night with fioricet and xanaflex x2. Cast to left arm intact. O2 at 2L/NC. Sats 98%. SR on tele monitor. Awaiting placement at [facility]. Plan of care reviewed with pt, voiced understanding. SR up X3, call bell in reach. Instructed to call for needs or assistance.

## 2019-07-20 NOTE — ASSESSMENT & PLAN NOTE
IV steroids; taper from 125 to 40 mg ..    7/15: prednisone 40mg PO BID.    IV antibiotics x 1 more day  Nebs   Pulmonary consulted .    7/17 Prednisone 10mg bid; Abx completed Improving and anticipate discharge soon. Awaiting NH placement    7/20 IV sites have been lost.  No need to restart an IV.  Telemetry will be discontinued.  Continue DuoNeb treatments every 6 hr.  Continue prednisone 10 mg twice daily.

## 2019-07-20 NOTE — PLAN OF CARE
Problem: Adult Inpatient Plan of Care  Goal: Plan of Care Review  Outcome: Ongoing (interventions implemented as appropriate)  Fall precautions in place, bs remain diminished but clear 02 2l/min n/c respiratory therapy q4h, pain level remains at 3 tolerable, ambulatory in hallway with physical therapy, BSC feeds self, diabetes management WDL.

## 2019-07-20 NOTE — ASSESSMENT & PLAN NOTE
Will need NH placement she is not sure if she wants this.  working with her. If not waiting on nursing home may be able tod/c in am. Will need to find where she is going. She is working on that today   PPD ordered for NH placement .  Psyche eval this am per Dr. Antonio, awaiting approval for nursing home  7/19 state approval pending

## 2019-07-21 NOTE — SUBJECTIVE & OBJECTIVE
Review of Systems   Constitutional: Negative for chills and fever.   HENT: Positive for congestion and postnasal drip. Negative for ear discharge, hearing loss, sinus pressure and sore throat.    Respiratory: Positive for cough, shortness of breath and wheezing. Negative for apnea, choking and chest tightness.    Cardiovascular: Negative for chest pain, palpitations and leg swelling.   Gastrointestinal: Negative for blood in stool, nausea and vomiting.   Genitourinary: Negative for dysuria, hematuria and vaginal discharge.   Musculoskeletal: Positive for arthralgias and back pain. Negative for myalgias.        Left arm in cast   Chronic pain issues    Skin: Negative for pallor.   Neurological: Negative for dizziness and numbness.   Hematological: Does not bruise/bleed easily.   Psychiatric/Behavioral: Positive for sleep disturbance. Negative for confusion and suicidal ideas. The patient is not nervous/anxious.      Objective:     Vital Signs (Most Recent):  Temp: 96.5 °F (35.8 °C) (07/21/19 0440)  Pulse: 80 (07/21/19 0732)  Resp: 16 (07/21/19 0732)  BP: 117/67 (07/21/19 0705)  SpO2: 100 % (07/21/19 0732) Vital Signs (24h Range):  Temp:  [96.5 °F (35.8 °C)-98.3 °F (36.8 °C)] 96.5 °F (35.8 °C)  Pulse:  [] 80  Resp:  [16-18] 16  SpO2:  [95 %-100 %] 100 %  BP: (105-126)/(54-74) 117/67     Weight: 47.7 kg (105 lb 2.6 oz)  Body mass index is 18.05 kg/m².    Intake/Output Summary (Last 24 hours) at 7/21/2019 1029  Last data filed at 7/20/2019 1800  Gross per 24 hour   Intake 240 ml   Output 350 ml   Net -110 ml      Physical Exam   Constitutional: She is oriented to person, place, and time. She appears well-developed and well-nourished.   HENT:   Bilateral thick mucous to EAC   Cardiovascular: Normal rate, regular rhythm, normal heart sounds and intact distal pulses. Exam reveals no gallop and no friction rub.   No murmur heard.  Pulmonary/Chest: Effort normal. No respiratory distress. She has wheezes.   02 in place.   Decreased BS on the right   Abdominal: Soft. Bowel sounds are normal.   Musculoskeletal: Normal range of motion. She exhibits tenderness. She exhibits no edema.   Left arm in cast    Neurological: She is alert and oriented to person, place, and time. She has normal reflexes. No cranial nerve deficit.   Skin: Skin is warm and dry.   Psychiatric: She has a normal mood and affect. Her behavior is normal. Judgment and thought content normal.   Nursing note and vitals reviewed.    Lab Results   Component Value Date    WBC 8.18 07/17/2019    HGB 11.2 (L) 07/17/2019    HCT 35.2 (L) 07/17/2019    MCV 87 07/17/2019     07/17/2019     BMP  Lab Results   Component Value Date     (L) 07/17/2019    K 4.8 07/17/2019    CL 85 (L) 07/17/2019    CO2 40 (H) 07/17/2019    BUN 16 07/17/2019    CREATININE 0.6 07/17/2019    CALCIUM 9.1 07/17/2019    ANIONGAP 4 (L) 07/17/2019    ESTGFRAFRICA >60 07/17/2019    EGFRNONAA >60 07/17/2019     Lab Results   Component Value Date    ALT 17 07/13/2019    AST 21 07/13/2019    ALKPHOS 65 07/13/2019    BILITOT 0.4 07/13/2019     Lactic acid 0.6  Lab Results   Component Value Date    DDIMER 1.15 (H) 07/12/2019     Lab Results   Component Value Date    INR 1.0 07/12/2019    INR 0.9 06/14/2019    INR 1.0 06/14/2017     BNP  No results for input(s): BNP, BNPTRIAGEBLO in the last 168 hours.  No results for input(s): CPK, CPKMB, TROPONINI, MB in the last 168 hours.  Flu negative .    Blood cultures NGTD x 2    CXR Chronic lung changes are noted bilaterally.  There is hyperlucency of the right lung likely related to underlying bullous change.  The heart is normal in size.  Calcified atheromatous disease affects the aorta.  Age-appropriate degenerative changes affect the skeleton.    EKG Baseline wander Normal sinus rhythm  Right axis deviation  T wave abnormality, consider anterior ischemia or Persistent Normal  variant, juvenile T waves  Abnormal ECG  When compared with ECG of 06-JUL-2019  01:08,  No significant change was found

## 2019-07-21 NOTE — PLAN OF CARE
Problem: Adult Inpatient Plan of Care  Goal: Plan of Care Review  Fall Risk remains; safety measures in place, sob 02 at 2.5l/min RT intertvals, Pain level 6 tolerable, up to bsc per self ADLs has refused bathing last 3 days stating she is just to weak, PT daily, DM managed.

## 2019-07-21 NOTE — PT/OT/SLP PROGRESS
Physical Therapy      Patient Name:  Di Deluca   MRN:  [id number]    Patient not seen today secondary to Patient unwilling to participate. Patient reported she wanted to shower today and was afraid that she would be too tired to do so if she participated in therapy.  Will follow-up as pt is available..    Adrian Bosch, PT

## 2019-07-21 NOTE — PROGRESS NOTES
"[facility] Medicine  Progress Note    Patient Name: Di Deluca  MRN: [id number]  Patient Class: IP- Inpatient   Admission Date: 7/12/2019  Length of Stay: 9 days  Attending Physician: Lee Ku MD  Primary Care Provider: Thomas Huertas MD        Subjective:     Principal Problem:COPD exacerbation      HPI:    Di Deluca is a 68 y.o. female with PMH of severe COPD /on  Home oxygen ,   LBP, spur, chickenpox, HTN, rheumatic disease, thyroid disease parented to ER with shortness of breath.  She used to live in [place] ; moved to her friend here " got kicked out of house "  Friend reports pain med issues . Multiple ER visit >  Seems home less ; has two living daughters :[place]. Recently fell broke left arm  Seen Dr damien armendariz ; went to er for pain meds and COPD   Patient has shortness of breath, longstanding history of COPD reported this p.m.  This is not new to us, this patient has been to the ER several times in last 2 months  She had  active wheezing on ER presentation this morning  Patient is on 4 liters of oxygen at home still smokes at least 2 packs a day for years.    Placed in observation for COPD exacerbation .  She will need NH placement ; no place to live ; multiple ER visits :  She was told for NH placement 3 months ago;she declined .      Overview/Hospital Course:  7/13/19  Looks better ;keeps asking for pain meds  Again long discussion .  Shortness of breath better   Wheezing better   BIPAP ordered.  She is on rocephin , zithromax,     7/14/19  Breathing wise much better   Will need NH placement ; home less   Will taper steroids  Continue nebs and antibiotics     7/15/19  She is on day 4 azithromycin and rocephin. She has reduced medrol 40mg IV every 8hr. Using O2 (has 3L at home) here on 2L pox 95%. No fever. WBC 09506. Still smoker. nicotine patch ordered  Asking about her Fioricet for chronic pain as this is what she is using at home long term  She " also reports that she has left short arm cast due to fall. Refused PT yesterday. Had it placed 1 week ago.     7/16/19 NAD overnight. 2LNC - O2 sat 98-99%  WBC 14.76> 11.31, afebrile   She is on day 5/5 azithromycin and rocephin. Getting prednisone 40mg bid; taper to 10mg bid then daily for home  Na+ 124>128 after 1LNS  Pt reports feeling is much better and breathing is much better.   She needs NH placement ; homeless ;with respiratory issues ;  Awaiting for nursing approval; level II screening in process .    7/17/19 NAD , Afebrile, WBC 8.18 , O2 98% on 2LNC   ABX completed for COPD exac, Prednisone 10mg bid;  this am   Na 129+ ,   Waiting on required level II forms for possible nursing home admission  7/18/19  Remains stable. Planned for psyche consult today for clearance  for Nursing home admission  pt able to ambulate without assistive device x 40 ft with CGA/SBA while on supplemental oxygen via NC.  - O 2 sat 96-97%  Getting prednisone 10mg bid; -134   C/O left ear drainage, requesting lidocaine patches for left shoulder pain and low back pain. C/o inability to sleep due to pain.   Notes she broke left arm 13 days ago trying to break fall. States she needs to wear cast for 6 weeks. Again asking for pain Rx    7/19/19  Has been stable; awaiting  state approval for nursing home.   VSS. Afebrile. 2LNC - O2 sat 96-98%.   C/o vaginal yeast symptoms     7/20/19  Patient tolerating physical therapy.  COPD symptoms under control.  Lidocaine patches seem to be helping her pain.    7/21/19  Still waiting for nursing home placement.  Patient's COPD symptoms are much better.  She does require continuous oxygen.  Lidocaine patches help.  She slept well last night.  She is requesting Vistaril as needed for anxiety, Neosporin for the sores in her nose.    Review of Systems   Constitutional: Negative for chills and fever.   HENT: Positive for congestion and postnasal drip. Negative for ear discharge, hearing loss,  sinus pressure and sore throat.    Respiratory: Positive for cough, shortness of breath and wheezing. Negative for apnea, choking and chest tightness.    Cardiovascular: Negative for chest pain, palpitations and leg swelling.   Gastrointestinal: Negative for blood in stool, nausea and vomiting.   Genitourinary: Negative for dysuria, hematuria and vaginal discharge.   Musculoskeletal: Positive for arthralgias and back pain. Negative for myalgias.        Left arm in cast   Chronic pain issues    Skin: Negative for pallor.   Neurological: Negative for dizziness and numbness.   Hematological: Does not bruise/bleed easily.   Psychiatric/Behavioral: Positive for sleep disturbance. Negative for confusion and suicidal ideas. The patient is not nervous/anxious.      Objective:     Vital Signs (Most Recent):  Temp: 96.5 °F (35.8 °C) (07/21/19 0440)  Pulse: 80 (07/21/19 0732)  Resp: 16 (07/21/19 0732)  BP: 117/67 (07/21/19 0705)  SpO2: 100 % (07/21/19 0732) Vital Signs (24h Range):  Temp:  [96.5 °F (35.8 °C)-98.3 °F (36.8 °C)] 96.5 °F (35.8 °C)  Pulse:  [] 80  Resp:  [16-18] 16  SpO2:  [95 %-100 %] 100 %  BP: (105-126)/(54-74) 117/67     Weight: 47.7 kg (105 lb 2.6 oz)  Body mass index is 18.05 kg/m².    Intake/Output Summary (Last 24 hours) at 7/21/2019 1029  Last data filed at 7/20/2019 1800  Gross per 24 hour   Intake 240 ml   Output 350 ml   Net -110 ml      Physical Exam   Constitutional: She is oriented to person, place, and time. She appears well-developed and well-nourished.   HENT:   Bilateral thick mucous to EAC   Cardiovascular: Normal rate, regular rhythm, normal heart sounds and intact distal pulses. Exam reveals no gallop and no friction rub.   No murmur heard.  Pulmonary/Chest: Effort normal. No respiratory distress. She has wheezes.   02 in place.  Decreased BS on the right   Abdominal: Soft. Bowel sounds are normal.   Musculoskeletal: Normal range of motion. She exhibits tenderness. She exhibits no  edema.   Left arm in cast    Neurological: She is alert and oriented to person, place, and time. She has normal reflexes. No cranial nerve deficit.   Skin: Skin is warm and dry.   Psychiatric: She has a normal mood and affect. Her behavior is normal. Judgment and thought content normal.   Nursing note and vitals reviewed.    Lab Results   Component Value Date    WBC 8.18 07/17/2019    HGB 11.2 (L) 07/17/2019    HCT 35.2 (L) 07/17/2019    MCV 87 07/17/2019     07/17/2019     BMP  Lab Results   Component Value Date     (L) 07/17/2019    K 4.8 07/17/2019    CL 85 (L) 07/17/2019    CO2 40 (H) 07/17/2019    BUN 16 07/17/2019    CREATININE 0.6 07/17/2019    CALCIUM 9.1 07/17/2019    ANIONGAP 4 (L) 07/17/2019    ESTGFRAFRICA >60 07/17/2019    EGFRNONAA >60 07/17/2019     Lab Results   Component Value Date    ALT 17 07/13/2019    AST 21 07/13/2019    ALKPHOS 65 07/13/2019    BILITOT 0.4 07/13/2019     Lactic acid 0.6  Lab Results   Component Value Date    DDIMER 1.15 (H) 07/12/2019     Lab Results   Component Value Date    INR 1.0 07/12/2019    INR 0.9 06/14/2019    INR 1.0 06/14/2017     BNP  No results for input(s): BNP, BNPTRIAGEBLO in the last 168 hours.  No results for input(s): CPK, CPKMB, TROPONINI, MB in the last 168 hours.  Flu negative .    Blood cultures NGTD x 2    CXR Chronic lung changes are noted bilaterally.  There is hyperlucency of the right lung likely related to underlying bullous change.  The heart is normal in size.  Calcified atheromatous disease affects the aorta.  Age-appropriate degenerative changes affect the skeleton.    EKG Baseline wander Normal sinus rhythm  Right axis deviation  T wave abnormality, consider anterior ischemia or Persistent Normal  variant, juvenile T waves  Abnormal ECG  When compared with ECG of 06-JUL-2019 01:08,  No significant change was found      Assessment/Plan:      * COPD exacerbation  IV steroids; taper from 125 to 40 mg ..    7/15: prednisone 40mg PO  BID.    IV antibiotics x 1 more day  Nebs   Pulmonary consulted .    7/17 Prednisone 10mg bid; Abx completed Improving and anticipate discharge soon. Awaiting NH placement    7/20 IV sites have been lost.  No need to restart an IV.  Telemetry will be discontinued.  Continue DuoNeb treatments every 6 hr.  Continue prednisone 10 mg twice daily.      Acute respiratory failure with hypoxia and hypercarbia        Homeless single person  Will need NH placement she is not sure if she wants this.  working with her. If not waiting on nursing home may be able tod/c in am. Will need to find where she is going. She is working on that today   PPD ordered for NH placement .  Psyche eval this am per Dr. Antonio, awaiting approval for nursing home  7/19 state approval pending     Chronic narcotic use  Avoid narcotics.  Tizinidine ordered   Fioricet ordered .  Requesting lidocaine patch     Closed nondisplaced fracture of styloid process of left ulna with routine healing  S/p cast   .PT\  F/u outpt.    Closed Colles' fracture of left radius with routine healing  S/p cast by orthopedics.  Will f/u oupt  PT.        Chronic obstructive pulmonary disease  Nebs  For now   Needs to stop smoking .      Chronic respiratory failure with hypoxia and hypercapnia  Continue nebs.  Checked ABG   Recommend BIPAP-- she is not using  Stable on 2LNC          Tobacco abuse  Nicotine patch .  Counseled again .      Essential hypertension  Continue amlodipine       Hyponatremia  Mos likely due to her pulmonary issues  BMP  Lab Results   Component Value Date     (L) 07/17/2019    K 4.8 07/17/2019    CL 85 (L) 07/17/2019    CO2 40 (H) 07/17/2019    BUN 16 07/17/2019    CREATININE 0.6 07/17/2019    CALCIUM 9.1 07/17/2019    ANIONGAP 4 (L) 07/17/2019    ESTGFRAFRICA >60 07/17/2019    EGFRNONAA >60 07/17/2019       Improved.  Free water restriction.      VTE Risk Mitigation (From admission, onward)        Ordered     IP VTE HIGH RISK  PATIENT  Once      07/12/19 1224     Place sequential compression device  Until discontinued      07/12/19 1224        Lee Ku MD  Department of Hospital Medicine   [facility]

## 2019-07-21 NOTE — PLAN OF CARE
Problem: Adult Inpatient Plan of Care  Goal: Plan of Care Review  Outcome: Ongoing (interventions implemented as appropriate)  Pt remains on current therapy, tolerating well and no distress noted.

## 2019-07-21 NOTE — PLAN OF CARE
Problem: Adult Inpatient Plan of Care  Goal: Plan of Care Review  Outcome: Ongoing (interventions implemented as appropriate)  VSS. Afebrile. Patient given Fioracet at 8pm and zanaflex at 11p,  Patient no longer on tele. Patient has no IV. Patient is awaiting placement in a long term care facility. No signs of distress noted. Call light in reach. Bed locked and low. Plan of care reviewed with Patient. Patient verbalized understanding.

## 2019-07-22 NOTE — PROGRESS NOTES
"[facility]  Adult Nutrition  Progress Note    SUMMARY       Recommendations    Recommendation/Intervention: Continue 2000 kcal diabetic diet   Goals: Prevent wt loss >2% of body weight while admitted  Nutrition Goal Status: new  Communication of RD Recs: other (comment)(POC)    Reason for Assessment    Reason For Assessment: length of stay  Diagnosis: other (see comments)(COPD exacerbation)  Relevant Medical History: HTN, thyroid diseasae  General Information Comments: Pt reports a usual BW between 110-118 lbs. Pt states she does not believe she has lost any weight but graphs indicate 12.5% body weight loss in 3 months.  Pt intaking 100% of meals, states her appetite is great and she is probably eating more than she needs.  Need to get a new wt on pt for monitoring purposes. NFPE findings indicate that the pt is well nourished.  Nutrition Discharge Planning: Diabetic diet    Nutrition Risk Screen    Nutrition Risk Screen: no indicators present    Nutrition/Diet History    Spiritual, Cultural Beliefs, Hinduism Practices, Values that Affect Care: no  Food Allergies: NKFA  Factors Affecting Nutritional Intake: None identified at this time    Anthropometrics    Temp: 96.2 °F (35.7 °C)  Height Method: Stated  Height: 5' 4" (162.6 cm)  Height (inches): 64 in  Weight Method: Bed Scale  Weight: 47.6 kg (105 lb)  Weight (lb): 105 lb  Ideal Body Weight (IBW), Female: 120 lb  % Ideal Body Weight, Female (lb): 87.5 lb  BMI (Calculated): 18.1  BMI Grade: 17 - 18.4 protein-energy malnutrition grade I     Lab/Procedures/Meds    Pertinent Labs Reviewed: reviewed  Pertinent Labs Comments: Na 129, glucose 123, A1c 6.6  Pertinent Medications Reviewed: reviewed  Pertinent Medications Comments: statin, pantoprazole, antibiotics    Estimated/Assessed Needs    Weight Used For Calorie Calculations: 54.4 kg (120 lb)(IBW)  Energy Calorie Requirements (kcal): 1496  Energy Need Method: [place](1.4 AF)  Protein " Requirements: 47-57 g/day(1-1.2 g/kg)  Weight Used For Protein Calculations: 47.6 kg (105 lb)  Fluid Requirements (mL): 1496  Estimated Fluid Requirement Method: RDA Method  RDA Method (mL): 1496  CHO Requirement: 50% of calories    Nutrition Prescription Ordered    Current Diet Order: Diabetic    Evaluation of Received Nutrient/Fluid Intake     % Intake of Estimated Energy Needs: 75 - 100 %  % Meal Intake: 75 - 100 %    Nutrition Risk    Level of Risk/Frequency of Follow-up: low     Assessment and Plan    Nutrition Problem:  Altered nutrition related lab values    Related to (etiology):   intake of carbohydrate foods    Signs and Symptoms (as evidenced by):   Glucose 123. A1c 6.6    Interventions/Recommendations (treatment strategy):  Continue 2000 kcal diabetic diet     Nutrition Diagnosis Status:   New       Monitor and Evaluation    Food and Nutrient Intake: energy intake, food and beverage intake  Food and Nutrient Adminstration: diet order  Physical Activity and Function: nutrition-related ADLs and IADLs  Anthropometric Measurements: weight, body mass index, weight change  Biochemical Data, Medical Tests and Procedures: electrolyte and renal panel, glucose/endocrine profile, inflammatory profile  Nutrition-Focused Physical Findings: overall appearance     Malnutrition Assessment     Skin (Micronutrient): bruised  Nails (Micronutrient): none  Eyes (Micronutrient): none       Weight Loss (Malnutrition): greater than 7.5% in 3 months   Orbital Region (Subcutaneous Fat Loss): well nourished   [place] (Muscle Loss): well nourished  Dorsal Hand (Muscle Loss): well nourished  Patellar Region (Muscle Loss): well nourished  Anterior Thigh Region (Muscle Loss): well nourished  Posterior Calf Region (Muscle Loss): well nourished   Edema (Fluid Accumulation): 0-->no edema present   Muscle Loss Evaluation (Final Summary): well nourished    Severe Weight Loss (Malnutrition): greater than 7.5% in 3 months    Nutrition  Follow-Up    RD Follow-up?: Yes           Monica Pappas, Provisional LDN

## 2019-07-22 NOTE — ASSESSMENT & PLAN NOTE
Will need NH placement she is not sure if she wants this.  working with her. If not waiting on nursing home may be able tod/c in am. Will need to find where she is going. She is working on that today   PPD ordered for NH placement .  Psyche eval this am per Dr. Antonio, awaiting approval for nursing home  7/19 state approval pending   7/22 still waiting on STATE- can take 7-10 days. [facility] placement pending. She is medically stable for discharge pending placement

## 2019-07-22 NOTE — PLAN OF CARE
Problem: Adult Inpatient Plan of Care  Goal: Plan of Care Review  Outcome: Ongoing (interventions implemented as appropriate)  Patient with some complaints of constant pain in back and headache. Some relief stated with PRN/scheduled medicines. Up to BSC independently, free from falls/injury. VS stable. A&O. Multiple bruises all over. No acute changes noted. Waiting on state approval for placement. Plan of care reviewed and agreed upon.

## 2019-07-22 NOTE — PLAN OF CARE
Problem: Adult Inpatient Plan of Care  Goal: Plan of Care Review  Outcome: Ongoing (interventions implemented as appropriate)  Patient aware of plan of care. VS stable. Afebrile. Glycemic monitoring. 2L O2 per NC in use, tolerating well. PT/OT. BSC. Still awaiting state approval for NH placement. Free from falls/injuries. No questions or concerns at this time. Agrees with plan of care.

## 2019-07-22 NOTE — PT/OT/SLP PROGRESS
"Occupational Therapy   Treatment    Name: Di Deluca  MRN: [id number]  Admitting Diagnosis:  COPD exacerbation       Recommendations:     Discharge Recommendations: nursing facility, skilled, nursing facility, basic  Discharge Equipment Recommendations:  walker, rolling  Barriers to discharge:  Inaccessible home environment, Decreased caregiver support    Assessment:     Di Deluca is a 68 y.o. female with a medical diagnosis of COPD exacerbation.  She presents with short of breath with activity. Performance deficits affecting function are weakness, impaired functional mobilty, decreased safety awareness, impaired cardiopulmonary response to activity, impaired endurance, impaired self care skills.     Rehab Prognosis:  Fair; patient would benefit from acute skilled OT services to address these deficits and reach maximum level of function.       Plan:     Patient to be seen 5 x/week to address the above listed problems via self-care/home management, therapeutic activities, therapeutic exercises, neuromuscular re-education  · Plan of Care Expires: 07/31/19  · Plan of Care Reviewed with: patient    Subjective     Pain/Comfort:  · Pain Rating 1: 0/10  · Location - Side 1: Left  · Location - Orientation 1: generalized  · Location 1: arm  · Pain Addressed 1: Pre-medicate for activity  · Pain Rating Post-Intervention 1: 0/10    Objective:     Communicated with: nursing prior to session.  Patient found supine with oxygen, telemetry upon OT entry to room.    General Precautions: Standard, fall   Orthopedic Precautions:N/A   Braces: N/A(left wrist in cast)     Occupational Performance:     Bed Mobility:    · Patient completed Rolling/Turning to Left with  independence  · Patient completed Rolling/Turning to Right with independence  · Patient completed Scooting/Bridging with independence  · Patient completed Supine to Sit with independence  · Patient completed Sit to Supine with independence   · Says "look" hussein from PT " "showed me how to lay on my side and use the bed controls to raise my head up so I can breath better, I put a pillow under my left arm in the cast.    Functional Mobility/Transfers:  · Patient completed Sit <> Stand Transfer with independence  with  no assistive device   · Patient completed Bed <> Chair Transfer using Stand Pivot technique with independence with no assistive device  · Patient completed Toilet Transfer Stand Pivot technique with independence with  no AD  · Functional Mobility: patient often reluctant to get out of bed and participate, did well with this method "today, we are going to measure your 02 SATS then have you participate in walking and ADL and take your 02 SATS again, that way, she knows what is expected'   · SATS before activity  On 2 1/2 liters = 97%, HR =102 bpm  After 10 min out of bed activity = 95%, HR = 110 bpm  · She states "yesterday I took a shower for my therapy, she washed my hair and wrapped my arm in a plastic bag to keep it dry, I kept my 02 on during the shower and did well."    Activities of Daily Living:  · Feeding:  independence .  · Grooming: independence .  · Bathing: NT - took shower with nursing help yesterday- min A per [facility]  · Upper Body Dressing: independence gown  · Lower Body Dressing: independence socks  · Toileting: independence using C      [facility] 6 Click ADL: 22    Treatment & Education:  Self care    Patient left right sidelying with all lines intact, call button in reach and chair alarm onEducation:       GOALS:   Multidisciplinary Problems     Occupational Therapy Goals        Problem: Occupational Therapy Goal    Goal Priority Disciplines Outcome Interventions   Occupational Therapy Goal     OT, PT/OT     Description:  Goals to be met by: 7/31/2019     Patient will increase functional independence with ADLs by performing:    LE Dressing with [place].  Grooming while standing at sink with [place].  Toileting from toilet with " [place] for hygiene and clothing management.   Bathing from  shower chair/bench with Modified [place].  Upper extremity exercise program x10 reps per handout, with assistance as needed with stable vital signs. 02 > 90 % on 2 liters.                       Time Tracking:     OT Date of Treatment: 07/22/19  OT Start Time: 1345  OT Stop Time: 1400  OT Total Time (min): 15 min    Billable Minutes:Self Care/Home Management 15    Aidee Nettles OT  7/22/2019

## 2019-07-22 NOTE — PLAN OF CARE
Problem: Adult Inpatient Plan of Care  Goal: Plan of Care Review  Outcome: Ongoing (interventions implemented as appropriate)  Recommendation/Intervention: Continue 2000 kcal diabetic diet   Goals: Prevent wt loss >2% of body weight while admitted  Nutrition Goal Status: new  Nutrition Discharge Planning: Diabetic diet          Monica Pappas, Provisional LDN

## 2019-07-22 NOTE — PLAN OF CARE
"   07/22/19 1318   Discharge Reassessment   Assessment Type Discharge Planning Reassessment   Provided patient/caregiver education on the expected discharge date and the discharge plan Yes   Do you have any problems affording any of your prescribed medications? Yes   If yes, what medications? All meds "I have no money"   Discharge Plan A New Nursing Home placement - assisted care facility   Discharge Plan B Shelter   DME Needed Upon Discharge  none   Patient choice form signed by patient/caregiver Yes   Anticipated Discharge Disposition Int Care Fac   Can the patient answer the patient profile reliably? Yes, cognitively intact   How does the patient rate their overall health at the present time? Poor   Describe the patient's ability to walk at the present time. Minor restrictions or changes   How often would a person be available to care for the patient? Infrequently   During the past month, has the patient often been bothered by feeling down, depressed or hopeless? No   During the past month, has the patient often been bothered by little interest or pleasure in doing things? No   Post-Acute Status   Post-Acute Authorization Placement   Post-Acute Placement Status Pending State Certification   Discharge Delays (!) Other  (waiting on Level 2 screen )           "

## 2019-07-22 NOTE — ASSESSMENT & PLAN NOTE
IV steroids; taper from 125 to 40 mg ..    7/15: prednisone 40mg PO BID.    IV antibiotics x 1 more day  Nebs   Pulmonary consulted .    7/17 Prednisone 10mg bid; Abx completed Improving and anticipate discharge soon. Awaiting NH placement  D/c today s/p 7 days    7/20 IV sites have been lost.  No need to restart an IV.  Telemetry will be discontinued.  Continue DuoNeb treatments every 6 hr.  Continue prednisone 10 mg twice daily.    7/22 COPD exacerbation resolved. Day 7 of prednisone 10mg po BID; stopped today. Also on duoneb every 4 hr--lungs are clear but patient is requesting continue

## 2019-07-22 NOTE — PROGRESS NOTES
Staff Handoff    Bedside report received from BAYRON Couch. VS stable. Afebrile. No distress noted. Assessment complete per flowsheet. Call bell in reach. Instructed to call for any needs. Will continue to monitor for any change in status.  Resident Handoff

## 2019-07-22 NOTE — PROGRESS NOTES
"[facility] Hospital Medicine  Progress Note    Patient Name: Di Deluca  MRN: [id number]  Patient Class: IP- Inpatient   Admission Date: 7/12/2019  Length of Stay: 10 days  Attending Physician: Lee Ku MD  Primary Care Provider: Thomas Huertas MD        Subjective:     Principal Problem:COPD exacerbation      HPI:    Di Deluca is a 68 y.o. female with PMH of severe COPD /on  Home oxygen ,   LBP, spur, chickenpox, HTN, rheumatic disease, thyroid disease parented to ER with shortness of breath.  She used to live in [place] ; moved to her friend here " got kicked out of house "  Friend reports pain med issues . Multiple ER visit >  Seems home less ; has two living daughters :[place]. Recently fell broke left arm  Seen Dr damien armendariz ; went to er for pain meds and COPD   Patient has shortness of breath, longstanding history of COPD reported this p.m.  This is not new to us, this patient has been to the ER several times in last 2 months  She had  active wheezing on ER presentation this morning  Patient is on 4 liters of oxygen at home still smokes at least 2 packs a day for years.    Placed in observation for COPD exacerbation .  She will need NH placement ; no place to live ; multiple ER visits :  She was told for NH placement 3 months ago;she declined .      Overview/Hospital Course:  7/13/19  Looks better ;keeps asking for pain meds  Again long discussion .  Shortness of breath better   Wheezing better   BIPAP ordered.  She is on rocephin , zithromax,     7/14/19  Breathing wise much better   Will need NH placement ; home less   Will taper steroids  Continue nebs and antibiotics     7/15/19  She is on day 4 azithromycin and rocephin. She has reduced medrol 40mg IV every 8hr. Using O2 (has 3L at home) here on 2L pox 95%. No fever. WBC 18457. Still smoker. nicotine patch ordered  Asking about her Fioricet for chronic pain as this is what she is using at home long term  She " also reports that she has left short arm cast due to fall. Refused PT yesterday. Had it placed 1 week ago.     7/16/19 NAD overnight. 2LNC - O2 sat 98-99%  WBC 14.76> 11.31, afebrile   She is on day 5/5 azithromycin and rocephin. Getting prednisone 40mg bid; taper to 10mg bid then daily for home  Na+ 124>128 after 1LNS  Pt reports feeling is much better and breathing is much better.   She needs NH placement ; homeless ;with respiratory issues ;  Awaiting for nursing approval; level II screening in process .    7/17/19 NAD , Afebrile, WBC 8.18 , O2 98% on 2LNC   ABX completed for COPD exac, Prednisone 10mg bid;  this am   Na 129+ ,   Waiting on required level II forms for possible nursing home admission  7/18/19  Remains stable. Planned for psyche consult today for clearance  for Nursing home admission  pt able to ambulate without assistive device x 40 ft with CGA/SBA while on supplemental oxygen via NC.  - O 2 sat 96-97%  Getting prednisone 10mg bid; -134   C/O left ear drainage, requesting lidocaine patches for left shoulder pain and low back pain. C/o inability to sleep due to pain.   Notes she broke left arm 13 days ago trying to break fall. States she needs to wear cast for 6 weeks. Again asking for pain Rx    7/19/19  Has been stable; awaiting  state approval for nursing home.   VSS. Afebrile. 2LNC - O2 sat 96-98%.   C/o vaginal yeast symptoms     7/20/19  Patient tolerating physical therapy.  COPD symptoms under control.  Lidocaine patches seem to be helping her pain.    7/21/19  Still waiting for nursing home placement.  Patient's COPD symptoms are much better.  She does require continuous oxygen.  Lidocaine patches help.  She slept well last night.  She is requesting Vistaril as needed for anxiety, Neosporin for the sores in her nose.    7/22/19  Case management working with patient for nursing home placement. Awaiting STATE approval of 90L 2nd ami screen. Dr Antonio did on 9/18. [facility] placement pending    Her COPD is at her baseline. Using home O2 at 2.5L NC     Refused PT yesterday stating that she was too weak. Saturday she did well 100ft x2 trials on 2.5 L supplemental O2.  Pt with very slow gait speed and very short step length requiring multiple standing rest breaks.    Review of Systems   Constitutional: Negative for chills and fever.   HENT: Negative for congestion, ear discharge, hearing loss, postnasal drip, sinus pressure and sore throat.    Respiratory: Negative for apnea, cough, choking, chest tightness, shortness of breath and wheezing.    Cardiovascular: Negative for chest pain, palpitations and leg swelling.   Gastrointestinal: Negative for blood in stool, nausea and vomiting.   Genitourinary: Negative for dysuria, hematuria and vaginal discharge.   Musculoskeletal: Positive for arthralgias and back pain. Negative for myalgias.        Left arm in cast   Chronic pain issues    Skin: Negative for pallor.   Neurological: Negative for dizziness and numbness.   Hematological: Does not bruise/bleed easily.   Psychiatric/Behavioral: Positive for sleep disturbance. Negative for confusion and suicidal ideas. The patient is not nervous/anxious.      Objective:     Vital Signs (Most Recent):  Temp: 96.2 °F (35.7 °C) (07/22/19 0731)  Pulse: 89 (07/22/19 0731)  Resp: 19 (07/22/19 0731)  BP: 104/72 (07/22/19 0731)  SpO2: 99 % (07/22/19 0731) Vital Signs (24h Range):  Temp:  [96.2 °F (35.7 °C)-98.7 °F (37.1 °C)] 96.2 °F (35.7 °C)  Pulse:  [79-98] 89  Resp:  [16-19] 19  SpO2:  [96 %-100 %] 99 %  BP: (104-129)/(60-72) 104/72     Weight: 47.7 kg (105 lb 2.6 oz)  Body mass index is 18.05 kg/m².    Intake/Output Summary (Last 24 hours) at 7/22/2019 0810  Last data filed at 7/22/2019 0400  Gross per 24 hour   Intake 600 ml   Output 800 ml   Net -200 ml      Physical Exam   Constitutional: She is oriented to person, place, and time. She appears well-developed and well-nourished.   HENT:    Head: Normocephalic and atraumatic.   Nose: Nose normal.   Eyes: Pupils are equal, round, and reactive to light. Conjunctivae and EOM are normal.   Neck: Neck supple. No tracheal deviation present.   Cardiovascular: Normal rate, regular rhythm and normal heart sounds. Exam reveals no gallop and no friction rub.   No murmur heard.  Pulmonary/Chest: Effort normal. No respiratory distress. She has no wheezes.   02 in place.    Clear breath sounds b/l   Abdominal: Soft. Bowel sounds are normal.   Musculoskeletal: Normal range of motion. She exhibits no edema.   Left arm in cast    Neurological: She is alert and oriented to person, place, and time. She has normal reflexes. No cranial nerve deficit.   Skin: Skin is warm and dry.   Psychiatric: She has a normal mood and affect. Her behavior is normal.   Nursing note and vitals reviewed.    Lab Results   Component Value Date    WBC 8.18 07/17/2019    HGB 11.2 (L) 07/17/2019    HCT 35.2 (L) 07/17/2019    MCV 87 07/17/2019     07/17/2019     BMP  Lab Results   Component Value Date     (L) 07/17/2019    K 4.8 07/17/2019    CL 85 (L) 07/17/2019    CO2 40 (H) 07/17/2019    BUN 16 07/17/2019    CREATININE 0.6 07/17/2019    CALCIUM 9.1 07/17/2019    ANIONGAP 4 (L) 07/17/2019    ESTGFRAFRICA >60 07/17/2019    EGFRNONAA >60 07/17/2019     Lab Results   Component Value Date    ALT 17 07/13/2019    AST 21 07/13/2019    ALKPHOS 65 07/13/2019    BILITOT 0.4 07/13/2019     Lactic acid 0.6  Lab Results   Component Value Date    DDIMER 1.15 (H) 07/12/2019     Lab Results   Component Value Date    INR 1.0 07/12/2019    INR 0.9 06/14/2019    INR 1.0 06/14/2017         Flu negative     Blood cultures NGTD x 2    7/15 CTA   Unilateral hyperlucent lung involving the right lower lobe.  Findings could reflect asymmetric pulmonary emphysema versus bronchiolitis obliterans or sequela of Swyer Louis syndrome.    No evidence of pulmonary embolism.  No evidence of aneurysm or  dissection.    CXR Chronic lung changes are noted bilaterally.  There is hyperlucency of the right lung likely related to underlying bullous change.  The heart is normal in size.  Calcified atheromatous disease affects the aorta.  Age-appropriate degenerative changes affect the skeleton.    EKG Baseline wander Normal sinus rhythm  Right axis deviation  T wave abnormality, consider anterior ischemia or Persistent Normal  variant, juvenile T waves  Abnormal ECG  When compared with ECG of 06-JUL-2019 01:08,  No significant change was found      Assessment/Plan:      * COPD exacerbation  IV steroids; taper from 125 to 40 mg ..    7/15: prednisone 40mg PO BID.    IV antibiotics x 1 more day  Nebs   Pulmonary consulted .    7/17 Prednisone 10mg bid; Abx completed Improving and anticipate discharge soon. Awaiting NH placement  D/c today s/p 7 days    7/20 IV sites have been lost.  No need to restart an IV.  Telemetry will be discontinued.  Continue DuoNeb treatments every 6 hr.  Continue prednisone 10 mg twice daily.    7/22 COPD exacerbation resolved. Day 7 of prednisone 10mg po BID; stopped today. Also on duoneb every 4 hr--lungs are clear but patient is requesting continue      Acute respiratory failure with hypoxia and hypercarbia        Homeless single person  Will need NH placement she is not sure if she wants this.  working with her. If not waiting on nursing home may be able tod/c in am. Will need to find where she is going. She is working on that today   PPD ordered for NH placement .  Psyche eval this am per Dr. Antonio, awaiting approval for nursing home  7/19 state approval pending   7/22 still waiting on STATE- can take 7-10 days. [facility] placement pending. She is medically stable for discharge pending placement    Chronic narcotic use  Avoid narcotics.  Tizinidine ordered   Fioricet ordered .  Requesting lidocaine patch ordered    Closed nondisplaced fracture of styloid process of  left ulna with routine healing  S/p cast   .PT\  F/u outpt.    Closed Colles' fracture of left radius with routine healing  S/p cast by orthopedics (2 weeks ago).  Will f/u oupt (1 week)  PT.        Chronic obstructive pulmonary disease  Nebs  For now   Needs to stop smoking .      Chronic respiratory failure with hypoxia and hypercapnia  Continue nebs.  Checked ABG   Recommend BIPAP-- she is not using  Stable on 2LNC          Tobacco abuse  Nicotine patch .  Counseled again .      Essential hypertension  Continue amlodipine   Well controlled 104//72      Hyponatremia  Mos likely due to her pulmonary issues  BMP  Lab Results   Component Value Date     (L) 07/17/2019    K 4.8 07/17/2019    CL 85 (L) 07/17/2019    CO2 40 (H) 07/17/2019    BUN 16 07/17/2019    CREATININE 0.6 07/17/2019    CALCIUM 9.1 07/17/2019    ANIONGAP 4 (L) 07/17/2019    ESTGFRAFRICA >60 07/17/2019    EGFRNONAA >60 07/17/2019       Improved.  Free water restriction.      VTE Risk Mitigation (From admission, onward)        Ordered     IP VTE HIGH RISK PATIENT  Once      07/12/19 1224     Place sequential compression device  Until discontinued      07/12/19 1224                Claudia Higginbotham MD  Department of Hospital Medicine   [facility]

## 2019-07-22 NOTE — SUBJECTIVE & OBJECTIVE
Review of Systems   Constitutional: Negative for chills and fever.   HENT: Negative for congestion, ear discharge, hearing loss, postnasal drip, sinus pressure and sore throat.    Respiratory: Negative for apnea, cough, choking, chest tightness, shortness of breath and wheezing.    Cardiovascular: Negative for chest pain, palpitations and leg swelling.   Gastrointestinal: Negative for blood in stool, nausea and vomiting.   Genitourinary: Negative for dysuria, hematuria and vaginal discharge.   Musculoskeletal: Positive for arthralgias and back pain. Negative for myalgias.        Left arm in cast   Chronic pain issues    Skin: Negative for pallor.   Neurological: Negative for dizziness and numbness.   Hematological: Does not bruise/bleed easily.   Psychiatric/Behavioral: Positive for sleep disturbance. Negative for confusion and suicidal ideas. The patient is not nervous/anxious.      Objective:     Vital Signs (Most Recent):  Temp: 96.2 °F (35.7 °C) (07/22/19 0731)  Pulse: 89 (07/22/19 0731)  Resp: 19 (07/22/19 0731)  BP: 104/72 (07/22/19 0731)  SpO2: 99 % (07/22/19 0731) Vital Signs (24h Range):  Temp:  [96.2 °F (35.7 °C)-98.7 °F (37.1 °C)] 96.2 °F (35.7 °C)  Pulse:  [79-98] 89  Resp:  [16-19] 19  SpO2:  [96 %-100 %] 99 %  BP: (104-129)/(60-72) 104/72     Weight: 47.7 kg (105 lb 2.6 oz)  Body mass index is 18.05 kg/m².    Intake/Output Summary (Last 24 hours) at 7/22/2019 0810  Last data filed at 7/22/2019 0400  Gross per 24 hour   Intake 600 ml   Output 800 ml   Net -200 ml      Physical Exam   Constitutional: She is oriented to person, place, and time. She appears well-developed and well-nourished.   HENT:   Head: Normocephalic and atraumatic.   Nose: Nose normal.   Eyes: Pupils are equal, round, and reactive to light. Conjunctivae and EOM are normal.   Neck: Neck supple. No tracheal deviation present.   Cardiovascular: Normal rate, regular rhythm and normal heart sounds. Exam reveals no gallop and no friction  rub.   No murmur heard.  Pulmonary/Chest: Effort normal. No respiratory distress. She has no wheezes.   02 in place.    Clear breath sounds b/l   Abdominal: Soft. Bowel sounds are normal.   Musculoskeletal: Normal range of motion. She exhibits no edema.   Left arm in cast    Neurological: She is alert and oriented to person, place, and time. She has normal reflexes. No cranial nerve deficit.   Skin: Skin is warm and dry.   Psychiatric: She has a normal mood and affect. Her behavior is normal.   Nursing note and vitals reviewed.    Lab Results   Component Value Date    WBC 8.18 07/17/2019    HGB 11.2 (L) 07/17/2019    HCT 35.2 (L) 07/17/2019    MCV 87 07/17/2019     07/17/2019     BMP  Lab Results   Component Value Date     (L) 07/17/2019    K 4.8 07/17/2019    CL 85 (L) 07/17/2019    CO2 40 (H) 07/17/2019    BUN 16 07/17/2019    CREATININE 0.6 07/17/2019    CALCIUM 9.1 07/17/2019    ANIONGAP 4 (L) 07/17/2019    ESTGFRAFRICA >60 07/17/2019    EGFRNONAA >60 07/17/2019     Lab Results   Component Value Date    ALT 17 07/13/2019    AST 21 07/13/2019    ALKPHOS 65 07/13/2019    BILITOT 0.4 07/13/2019     Lactic acid 0.6  Lab Results   Component Value Date    DDIMER 1.15 (H) 07/12/2019     Lab Results   Component Value Date    INR 1.0 07/12/2019    INR 0.9 06/14/2019    INR 1.0 06/14/2017         Flu negative     Blood cultures NGTD x 2    7/15 CTA   Unilateral hyperlucent lung involving the right lower lobe.  Findings could reflect asymmetric pulmonary emphysema versus bronchiolitis obliterans or sequela of Swyer Louis syndrome.    No evidence of pulmonary embolism.  No evidence of aneurysm or dissection.    CXR Chronic lung changes are noted bilaterally.  There is hyperlucency of the right lung likely related to underlying bullous change.  The heart is normal in size.  Calcified atheromatous disease affects the aorta.  Age-appropriate degenerative changes affect the skeleton.    EKG Baseline wander  Normal sinus rhythm  Right axis deviation  T wave abnormality, consider anterior ischemia or Persistent Normal  variant, juvenile T waves  Abnormal ECG  When compared with ECG of 06-JUL-2019 01:08,  No significant change was found

## 2019-07-23 NOTE — PROGRESS NOTES
"[facility] Medicine  Progress Note    Patient Name: Di Deluca  MRN: [id number]  Patient Class: IP- Inpatient   Admission Date: 7/12/2019  Length of Stay: 11 days  Attending Physician: Lee Ku MD  Primary Care Provider: Thomas Huertas MD        Subjective:     Principal Problem:COPD exacerbation      HPI:    Di Deluca is a 68 y.o. female with PMH of severe COPD /on  Home oxygen ,   LBP, spur, chickenpox, HTN, rheumatic disease, thyroid disease parented to ER with shortness of breath.  She used to live in [place] ; moved to her friend here " got kicked out of house "  Friend reports pain med issues . Multiple ER visit >  Seems home less ; has two living daughters :[place]. Recently fell broke left arm  Seen Dr damien armendariz ; went to er for pain meds and COPD   Patient has shortness of breath, longstanding history of COPD reported this p.m.  This is not new to us, this patient has been to the ER several times in last 2 months  She had  active wheezing on ER presentation this morning  Patient is on 4 liters of oxygen at home still smokes at least 2 packs a day for years.    Placed in observation for COPD exacerbation .  She will need NH placement ; no place to live ; multiple ER visits :  She was told for NH placement 3 months ago;she declined .      Overview/Hospital Course:  7/13/19  Looks better ;keeps asking for pain meds  Again long discussion .  Shortness of breath better   Wheezing better   BIPAP ordered.  She is on rocephin , zithromax,     7/14/19  Breathing wise much better   Will need NH placement ; home less   Will taper steroids  Continue nebs and antibiotics     7/15/19  She is on day 4 azithromycin and rocephin. She has reduced medrol 40mg IV every 8hr. Using O2 (has 3L at home) here on 2L pox 95%. No fever. WBC 17269. Still smoker. nicotine patch ordered  Asking about her Fioricet for chronic pain as this is what she is using at home long term  She " also reports that she has left short arm cast due to fall. Refused PT yesterday. Had it placed 1 week ago.     7/16/19 NAD overnight. 2LNC - O2 sat 98-99%  WBC 14.76> 11.31, afebrile   She is on day 5/5 azithromycin and rocephin. Getting prednisone 40mg bid; taper to 10mg bid then daily for home  Na+ 124>128 after 1LNS  Pt reports feeling is much better and breathing is much better.   She needs NH placement ; homeless ;with respiratory issues ;  Awaiting for nursing approval; level II screening in process .    7/17/19 NAD , Afebrile, WBC 8.18 , O2 98% on 2LNC   ABX completed for COPD exac, Prednisone 10mg bid;  this am   Na 129+ ,   Waiting on required level II forms for possible nursing home admission  7/18/19  Remains stable. Planned for psyche consult today for clearance  for Nursing home admission  pt able to ambulate without assistive device x 40 ft with CGA/SBA while on supplemental oxygen via NC.  - O 2 sat 96-97%  Getting prednisone 10mg bid; -134   C/O left ear drainage, requesting lidocaine patches for left shoulder pain and low back pain. C/o inability to sleep due to pain.   Notes she broke left arm 13 days ago trying to break fall. States she needs to wear cast for 6 weeks. Again asking for pain Rx    7/19/19  Has been stable; awaiting  state approval for nursing home.   VSS. Afebrile. 2LNC - O2 sat 96-98%.   C/o vaginal yeast symptoms     7/20/19  Patient tolerating physical therapy.  COPD symptoms under control.  Lidocaine patches seem to be helping her pain.    7/21/19  Still waiting for nursing home placement.  Patient's COPD symptoms are much better.  She does require continuous oxygen.  Lidocaine patches help.  She slept well last night.  She is requesting Vistaril as needed for anxiety, Neosporin for the sores in her nose.    7/22/19  Case management working with patient for nursing home placement. Awaiting STATE approval of 90L 2nd ami screen. Dr Antonio did on 9/18. [facility] pending    Her COPD is at her baseline. Using home O2 at 2.5L NC     Refused PT yesterday stating that she was too weak. Saturday she did well 100ft x2 trials on 2.5 L supplemental O2.  Pt with very slow gait speed and very short step length requiring multiple standing rest breaks.    7/23/19   pt has recovered from her COPD exacerbation. Steroids stopped yesterday as she completed 7 days prednisone BID. She is not longer on abx. Breathing is at baseline with nebs every 4hr. No fever on home dose 02.   Still awaiting state for level 2 approval.    Review of Systems   Constitutional: Negative for chills and fever.   HENT: Negative for congestion, ear discharge, hearing loss, postnasal drip, sinus pressure and sore throat.    Respiratory: Positive for cough. Negative for apnea, choking, chest tightness, shortness of breath and wheezing.    Cardiovascular: Negative for chest pain, palpitations and leg swelling.   Gastrointestinal: Negative for blood in stool, nausea and vomiting.   Genitourinary: Negative for dysuria, hematuria and vaginal discharge.   Musculoskeletal: Positive for arthralgias and back pain. Negative for myalgias.        Left arm in cast   Chronic pain issues    Skin: Negative for pallor.   Neurological: Negative for dizziness and numbness.   Hematological: Does not bruise/bleed easily.   Psychiatric/Behavioral: Positive for sleep disturbance. Negative for confusion and suicidal ideas. The patient is not nervous/anxious.      Objective:     Vital Signs (Most Recent):  Temp: 97.5 °F (36.4 °C) (07/23/19 0717)  Pulse: 90 (07/23/19 0733)  Resp: (!) 22 (07/23/19 0733)  BP: (!) 100/57 (07/23/19 0717)  SpO2: 95 % (07/23/19 0733) Vital Signs (24h Range):  Temp:  [96.4 °F (35.8 °C)-97.5 °F (36.4 °C)] 97.5 °F (36.4 °C)  Pulse:  [] 90  Resp:  [16-22] 22  SpO2:  [95 %-99 %] 95 %  BP: (100-121)/(53-67) 100/57     Weight: 47.6 kg (105 lb)  Body mass index is 18.02 kg/m².    Intake/Output  Summary (Last 24 hours) at 7/23/2019 0943  Last data filed at 7/23/2019 0811  Gross per 24 hour   Intake 360 ml   Output --   Net 360 ml      Physical Exam   Constitutional: She is oriented to person, place, and time. She appears well-developed and well-nourished.   HENT:   Head: Normocephalic and atraumatic.   Right Ear: External ear normal.   Left Ear: External ear normal.   Nose: Nose normal.   Mouth/Throat: Oropharynx is clear and moist.   Eyes: Pupils are equal, round, and reactive to light. Conjunctivae and EOM are normal.   Neck: Normal range of motion. Neck supple. No tracheal deviation present. No thyromegaly present.   Cardiovascular: Normal rate, regular rhythm, normal heart sounds and intact distal pulses. Exam reveals no gallop and no friction rub.   No murmur heard.  Pulmonary/Chest: Effort normal. No respiratory distress. She has no wheezes.   02 in place.    Clear breath sounds b/l   Abdominal: Soft. Bowel sounds are normal.   Musculoskeletal: Normal range of motion. She exhibits no edema.   Left arm in cast    Neurological: She is alert and oriented to person, place, and time. She has normal reflexes. No cranial nerve deficit.   Skin: Skin is warm and dry.   Psychiatric: She has a normal mood and affect. Her behavior is normal. Judgment and thought content normal.   Nursing note and vitals reviewed.    Lab Results   Component Value Date    WBC 8.18 07/17/2019    HGB 11.2 (L) 07/17/2019    HCT 35.2 (L) 07/17/2019    MCV 87 07/17/2019     07/17/2019     BMP  Lab Results   Component Value Date     (L) 07/17/2019    K 4.8 07/17/2019    CL 85 (L) 07/17/2019    CO2 40 (H) 07/17/2019    BUN 16 07/17/2019    CREATININE 0.6 07/17/2019    CALCIUM 9.1 07/17/2019    ANIONGAP 4 (L) 07/17/2019    ESTGFRAFRICA >60 07/17/2019    EGFRNONAA >60 07/17/2019     Lab Results   Component Value Date    ALT 17 07/13/2019    AST 21 07/13/2019    ALKPHOS 65 07/13/2019    BILITOT 0.4 07/13/2019     Lactic acid  0.6  Lab Results   Component Value Date    DDIMER 1.15 (H) 07/12/2019     Lab Results   Component Value Date    INR 1.0 07/12/2019    INR 0.9 06/14/2019    INR 1.0 06/14/2017         Flu negative     Blood cultures NGTD x 2    7/15 CTA   Unilateral hyperlucent lung involving the right lower lobe.  Findings could reflect asymmetric pulmonary emphysema versus bronchiolitis obliterans or sequela of Swyer Louis syndrome.    No evidence of pulmonary embolism.  No evidence of aneurysm or dissection.    CXR Chronic lung changes are noted bilaterally.  There is hyperlucency of the right lung likely related to underlying bullous change.  The heart is normal in size.  Calcified atheromatous disease affects the aorta.  Age-appropriate degenerative changes affect the skeleton.    EKG Baseline wander Normal sinus rhythm  Right axis deviation  T wave abnormality, consider anterior ischemia or Persistent Normal  variant, juvenile T waves  Abnormal ECG  When compared with ECG of 06-JUL-2019 01:08,  No significant change was found      Assessment/Plan:      * COPD exacerbation  IV steroids; taper from 125 to 40 mg ..    7/15: prednisone 40mg PO BID.    IV antibiotics x 1 more day  Nebs   Pulmonary consulted .    7/17 Prednisone 10mg bid; Abx completed Improving and anticipate discharge soon. Awaiting NH placement  D/c today s/p 7 days    7/20 IV sites have been lost.  No need to restart an IV.  Telemetry will be discontinued.  Continue DuoNeb treatments every 6 hr.  Continue prednisone 10 mg twice daily.    7/22 COPD exacerbation resolved. Day 7 of prednisone 10mg po BID; stopped today. Also on duoneb every 4 hr--lungs are clear but patient is requesting continue    7/23 cont nebs. She is on home routine. exacerbation resolved.    Acute respiratory failure with hypoxia and hypercarbia        Homeless single person  Will need NH placement she is not sure if she wants this.  working with her. If not waiting on  nursing home may be able tod/c in am. Will need to find where she is going. She is working on that today   PPD ordered for NH placement .  Psyche eval this am per Dr. Antonio, awaiting approval for nursing home  7/19 state approval pending .  7/22 still waiting on STATE- can take 7-10 days. [facility] placement pending. She is medically stable for discharge pending placement    Chronic narcotic use  Avoid narcotics.  Tizinidine ordered .  Fioricet ordered .  Requesting lidocaine patch ordered    Closed nondisplaced fracture of styloid process of left ulna with routine healing  S/p cast   .PT  F/u outpt.    Closed Colles' fracture of left radius with routine healing  S/p cast by orthopedics (2 weeks ago). Reports that she needs to be casted x 6 weeks total   Will f/u oupt but was seen in [place]. Will need ortho in [place] at d/c        Chronic obstructive pulmonary disease  Nebs  For now   Needs to stop smoking .      Chronic respiratory failure with hypoxia and hypercapnia  Continue nebs.  Checked ABG   Recommend BIPAP-- she is not using  Stable on 2LNC.          Tobacco abuse  Nicotine patch .  Counseled again       Essential hypertension  Continue amlodipine   Well controlled 104//72.      Hyponatremia  Mos likely due to her pulmonary issues  BMP  Lab Results   Component Value Date     (L) 07/17/2019    K 4.8 07/17/2019    CL 85 (L) 07/17/2019    CO2 40 (H) 07/17/2019    BUN 16 07/17/2019    CREATININE 0.6 07/17/2019    CALCIUM 9.1 07/17/2019    ANIONGAP 4 (L) 07/17/2019    ESTGFRAFRICA >60 07/17/2019    EGFRNONAA >60 07/17/2019       Improved.  Free water restriction      VTE Risk Mitigation (From admission, onward)        Ordered     IP VTE HIGH RISK PATIENT  Once      07/12/19 1224     Place sequential compression device  Until discontinued      07/12/19 1224                Vee Tejeda MD  Department of Hospital Medicine   [facility]

## 2019-07-23 NOTE — PLAN OF CARE
07/23/19 1427   Post-Acute Status   Post-Acute Authorization Placement   Post-Acute Placement Status Pending State Certification   Discharge Delays   (Level II Screening)       Patient remains at [facility] pending Level II Screening clearance. Patient received a psychiatric evaluation on Thursday, July 18, 2019 which was faxed to the Office of [facility] and Adult Services on July 18, 2019. Receipt of fax confirmed. The Office of Behavioral Health states that the process for approval can take 7-9 business days. TREY contacted the Office of Behavioral Health [facility] today for an update. A message was left for Vivien Patterson to contact TREY in regards to patient. Contacted Ciara with [facility] today to update on progress. Ciara states that a bed remains available to the patient until she is ready for discharge pending the Level II Screening.     Mee Morton, JAQUELIN

## 2019-07-23 NOTE — PT/OT/SLP PROGRESS
"Occupational Therapy   Treatment    Name: Di Deluca  MRN: [id number]  Admitting Diagnosis:  COPD exacerbation       Recommendations:     Discharge Recommendations: nursing facility, basic, nursing facility, skilled  Discharge Equipment Recommendations:  walker, rolling  Barriers to discharge:  Inaccessible home environment, Decreased caregiver support    Assessment:     Di Deluca is a 68 y.o. female with a medical diagnosis of COPD exacerbation.  She presents with improving activity tolerance (on 2 liters 02). Performance deficits affecting function are weakness, impaired functional mobilty, decreased safety awareness, impaired cardiopulmonary response to activity, impaired endurance, impaired self care skills.     NOTE: her 02 was turned up to 3 liters, SATS were 98%, HR =97 bpm. Therapist again explained that oxygen is a medicine and that she should be using the liters recommended by her MD/ RN / respiratory team, she says "I know , I know, I only turned it up because I was so busy this morning with bed bathing ect... Don't get me in trouble now".    Rehab Prognosis:  Fair; patient would benefit from acute skilled OT services to address these deficits and reach maximum level of function.       Plan:     Patient to be seen 5 x/week to address the above listed problems via self-care/home management, therapeutic activities, therapeutic exercises, neuromuscular re-education  · Plan of Care Expires: 07/31/19  · Plan of Care Reviewed with: patient    Subjective     Pain/Comfort:  · Pain Rating 1: 0/10  · Pain Rating Post-Intervention 1: 0/10    Objective:     Communicated with: nursing prior to session.  Patient found supine with oxygen, telemetry upon OT entry to room.    General Precautions: Standard, fall   Orthopedic Precautions:N/A   Braces: N/A     Occupational Performance:     Bed Mobility:    · Patient completed Rolling/Turning to Left with  independence  · Patient completed Rolling/Turning to Right with " independence  · Patient completed Scooting/Bridging with independence  · Patient completed Supine to Sit with independence  · Patient completed Sit to Supine with independence     Functional Mobility/Transfers:  · Given the challenge to try sitting up in chair for lunch she declines and opts for sitting edge of bed (waiting for pain patch)    Activities of Daily Living:  · Feeding:  modified independence .  · Grooming: independence .  · Bathing:NT  · Upper Body Dressing: independence gown  · Lower Body Dressing: independence socks via crossing legs long sitting or seated EOB  · Toileting:NT - using BS with nursing supervision level      [facility] 6 Click ADL: 24    Treatment & Education:  Self care to build activity tolerance    Patient left seated EOB with all lines intact and call button in reachEducation:      GOALS:   Multidisciplinary Problems     Occupational Therapy Goals        Problem: Occupational Therapy Goal    Goal Priority Disciplines Outcome Interventions   Occupational Therapy Goal     OT, PT/OT     Description:  Goals to be met by: 7/31/2019     Patient will increase functional independence with ADLs by performing:    LE Dressing with [place].  Grooming while standing at sink with [place].  Toileting from toilet with [place] for hygiene and clothing management.   Bathing from  shower chair/bench with Modified [place].  Upper extremity exercise program x10 reps per handout, with assistance as needed with stable vital signs. 02 > 90 % on 2 liters.                       Time Tracking:     OT Date of Treatment: 07/23/19  OT Start Time: 1145  OT Stop Time: 1200  OT Total Time (min): 15 min    Billable Minutes:Self Care/Home Management 15    Aidee Nettles OT  7/23/2019

## 2019-07-23 NOTE — ASSESSMENT & PLAN NOTE
S/p cast by orthopedics (2 weeks ago). Reports that she needs to be casted x 6 weeks total   Will f/u oupt but was seen in [place]. Will need ortho in [place] at d/c

## 2019-07-23 NOTE — PLAN OF CARE
07/23/19 1117   Discharge Reassessment   Assessment Type Discharge Planning Reassessment         Waiting on State approval for nursing home placement. Patient will remain for continued treatment today. No needs or concerns voiced at this time. CM will continue to follow and assist as needed.

## 2019-07-23 NOTE — ASSESSMENT & PLAN NOTE
Mos likely due to her pulmonary issues  BMP  Lab Results   Component Value Date     (L) 07/17/2019    K 4.8 07/17/2019    CL 85 (L) 07/17/2019    CO2 40 (H) 07/17/2019    BUN 16 07/17/2019    CREATININE 0.6 07/17/2019    CALCIUM 9.1 07/17/2019    ANIONGAP 4 (L) 07/17/2019    ESTGFRAFRICA >60 07/17/2019    EGFRNONAA >60 07/17/2019       Improved.  Free water restriction

## 2019-07-23 NOTE — ASSESSMENT & PLAN NOTE
IV steroids; taper from 125 to 40 mg ..    7/15: prednisone 40mg PO BID.    IV antibiotics x 1 more day  Nebs   Pulmonary consulted .    7/17 Prednisone 10mg bid; Abx completed Improving and anticipate discharge soon. Awaiting NH placement  D/c today s/p 7 days    7/20 IV sites have been lost.  No need to restart an IV.  Telemetry will be discontinued.  Continue DuoNeb treatments every 6 hr.  Continue prednisone 10 mg twice daily.    7/22 COPD exacerbation resolved. Day 7 of prednisone 10mg po BID; stopped today. Also on duoneb every 4 hr--lungs are clear but patient is requesting continue    7/23 cont nebs. She is on home routine. exacerbation resolved.

## 2019-07-23 NOTE — PT/OT/SLP PROGRESS
"Physical Therapy Treatment    Patient Name:  Di Deluca   MRN:  [id number]    Recommendations:     Discharge Recommendations:  nursing facility, skilled, nursing facility, basic   Discharge Equipment Recommendations: walker, rolling   Barriers to discharge: Inaccessible home and Decreased caregiver support    Assessment:     Di Deluca is a 68 y.o. female admitted with a medical diagnosis of COPD exacerbation.  She presents with the following impairments/functional limitations:  weakness, impaired self care skills, impaired functional mobilty, impaired endurance, pain, gait instability, decreased upper extremity function, impaired cardiopulmonary response to activity. Patient is showing good steady progress towards PT goals. Improved out of bed tasks towards level of modified independent. Patient prefers to use Rolling Walker for safe gait functions.    Rehab Prognosis: Good; patient would benefit from acute skilled PT services to address these deficits and reach maximum level of function.    Recent Surgery: * No surgery found *      Plan:     During this hospitalization, patient to be seen 5 x/week to address the identified rehab impairments via gait training, therapeutic activities, therapeutic exercises and progress toward the following goals:    · Plan of Care Expires:  07/25/19    Subjective     Chief Complaint: Patient has no pain complain today.  Patient/Family Comments/goals: "To Go to a nursing home".  Pain/Comfort:  · Pain Rating 1: 0/10  · Pain Rating Post-Intervention 1: 0/10      Objective:     Communicated with patient  prior to session.  Patient found Sitting up at Edge of the bed with telemetry, oxygen upon PT entry to room.     General Precautions: Standard, fall   Orthopedic Precautions:N/A   Braces: N/A     Functional Mobility:  · Bed Mobility:     · Rolling Left:  modified independence  · Rolling Right: modified independence  · Scooting: modified independence  · Supine to Sit: modified " independence  · Sit to Supine: modified independence  · Transfers:     · Sit to Stand:  modified independence with rolling walker  · Bed to Chair: modified independence with  rolling walker  using  Step Transfer  · Gait: RW Ambulation x 150 feet(twice) with Supervision. Reciprocal gait      AM-PAC 6 CLICK MOBILITY  Turning over in bed (including adjusting bedclothes, sheets and blankets)?: 4  Sitting down on and standing up from a chair with arms (e.g., wheelchair, bedside commode, etc.): 4  Moving from lying on back to sitting on the side of the bed?: 4  Moving to and from a bed to a chair (including a wheelchair)?: 4  Need to walk in hospital room?: 3  Climbing 3-5 steps with a railing?: 3  Basic Mobility Total Score: 22       Therapeutic Activities and Exercises:   GAIT: Pt able to ambulate with rolling walker with Supervision or Set-up Assistance x 150 ft (twice) with the following gait deviation(s): Reciprocal Gait. Pt performed Bilat LE exercises consisting of Active range of motion exercises for Strengthening and coordination ex  x 10 reps consisting of Ankle DF, Ankle PF, Quad Sets, Heel slides, ABD/ADD, LAQ and Marching at [place] with pt able to tolerate activities well with no sign of respiratory distress.   PT discussed importance of patient's performance of Home Exercise Program (HEP) with pt verbalizing understanding.     Patient left HOB elevated with all lines intact, call button in reach, bed alarm on and Nurse notified..    GOALS:   Multidisciplinary Problems     Physical Therapy Goals        Problem: Physical Therapy Goal    Goal Priority Disciplines Outcome Goal Variances Interventions   Physical Therapy Goal     PT, PT/OT Ongoing (interventions implemented as appropriate)     Description:  Goals to be met by: 19     Patient will increase functional independence with mobility by performin. Sit to stand transfer with [place]  2. Bed to chair transfer with [place]  3. Gait  x  100 feet with Supervision.   4. Ascend/descend 17 stair with bilateral Handrails Stand-by Assistance.   5. Lower extremity exercise program x10 reps per handout, with independence                      Time Tracking:     PT Received On: 07/23/19  PT Start Time: 1241     PT Stop Time: 1312  PT Total Time (min): 31 min     Billable Minutes: Gait Training 15 and Therapeutic Exercise 15    Treatment Type: Treatment  PT/PTA: PT           Ryne Cabezas, PT  07/23/2019

## 2019-07-23 NOTE — ASSESSMENT & PLAN NOTE
Will need NH placement she is not sure if she wants this.  working with her. If not waiting on nursing home may be able tod/c in am. Will need to find where she is going. She is working on that today   PPD ordered for NH placement .  Psyche eval this am per Dr. Antonio, awaiting approval for nursing home  7/19 state approval pending .  7/22 still waiting on STATE- can take 7-10 days. [facility] placement pending. She is medically stable for discharge pending placement

## 2019-07-23 NOTE — PHYSICIAN QUERY
PT Name: Di Deluca  MR #: [id number]    Physician Query Form - Nutrition Clarification     CDS/: Elmerjerald Covington               Contact information:    [email or web address]    This form is a permanent document in the medical record.     Query Date: July 23, 2019    By submitting this query, we are merely seeking further clarification of documentation.. Please utilize your independent clinical judgment when addressing the question(s) below.    The Medical record contains the following:   Indicators  Supporting Clinical Findings Location in Medical Record    % of Estimated Energy Intake over a time frame from p.o., TF, or TPN % Intake of Estimated Energy Needs: 75 - 100 % 7/22 Nutrition - progress note   x Weight Status over a time frame  ...she has lost any weight but graphs indicate 12.5% body weight loss in 3 months.    Weight Loss (Malnutrition): greater than 7.5% in 3 months  7/22  Nutrition- progress note          7/22  Nutrition- progress note    Subcutaneous Fat and/or Muscle Loss     x Fluid Accumulation or Edema Edema (Fluid Accumulation): 0-->no edema present  7/22  Nutrition progress note    Reduced  Strength     x Wt / BMI / Usual Body Weight BMI Grade: 17 - 18.4 protein-energy malnutrition grade I 7/22 - Nutrition- progress note       Delayed Wound Healing / Failure to Thrive     x Acute or Chronic Illness COPD exacerbation, Homeless single person, Chronic respiratory failure with hypoxia 7/12  H&P    Medication      Treatment Recommendation/Intervention: Continue 2000 kcal diabetic diet   Goals: Prevent wt loss >2% of body weight while admitted 7/22  Nutrition -progress note     Other       AND / ASPEN Clinical Characteristics (October 2011)  A minimum of two characteristics is recommended for diagnosing either moderate or severe malnutrition   Mild Malnutrition Moderate Malnutrition Severe Malnutrition   Energy Intake from p.o., TF or TPN. < 75% intake of estimated energy needs for  less than 7 days < 75% intake of estimated energy needs for greater than 7 days < 50% intake of estimated energy needs for > 5 days   Weight Loss 1-2% in 1 month  5% in 3 months  7.5% in 6 months  10% in 1 year 1-2 % in 1 week  5% in 1 month  7.5% in 3 months  10% in 6 months  20% in 1 year > 2% in 1 week  > 5% in 1 month  > 7.5% in 3 months  > 10% in 6 months  > 20% in 1 year   Physical Findings     None *Mild subcutaneous fat and/or muscle loss  *Mild fluid accumulation  *Stage II decubitus  *Surgical wound or non-healing wound *Mod/severe subcutaneous fat and/or muscle loss  *Mod/severe fluid accumulation  *Stage III or IV decubitus  *Non-healing surgical wound     Provider, please specify diagnosis or diagnoses associated with above clinical findings.    [ x ] Moderate Protein-Calorie Malnutrition   [  ] Other Nutritional Diagnosis (please specify):    [  ] Other:    [  ] Clinically Undetermined       Please document in your progress notes daily for the duration of treatment until resolved and include in your discharge summary.

## 2019-07-23 NOTE — SUBJECTIVE & OBJECTIVE
Review of Systems   Constitutional: Negative for chills and fever.   HENT: Negative for congestion, ear discharge, hearing loss, postnasal drip, sinus pressure and sore throat.    Respiratory: Positive for cough. Negative for apnea, choking, chest tightness, shortness of breath and wheezing.    Cardiovascular: Negative for chest pain, palpitations and leg swelling.   Gastrointestinal: Negative for blood in stool, nausea and vomiting.   Genitourinary: Negative for dysuria, hematuria and vaginal discharge.   Musculoskeletal: Positive for arthralgias and back pain. Negative for myalgias.        Left arm in cast   Chronic pain issues    Skin: Negative for pallor.   Neurological: Negative for dizziness and numbness.   Hematological: Does not bruise/bleed easily.   Psychiatric/Behavioral: Positive for sleep disturbance. Negative for confusion and suicidal ideas. The patient is not nervous/anxious.      Objective:     Vital Signs (Most Recent):  Temp: 97.5 °F (36.4 °C) (07/23/19 0717)  Pulse: 90 (07/23/19 0733)  Resp: (!) 22 (07/23/19 0733)  BP: (!) 100/57 (07/23/19 0717)  SpO2: 95 % (07/23/19 0733) Vital Signs (24h Range):  Temp:  [96.4 °F (35.8 °C)-97.5 °F (36.4 °C)] 97.5 °F (36.4 °C)  Pulse:  [] 90  Resp:  [16-22] 22  SpO2:  [95 %-99 %] 95 %  BP: (100-121)/(53-67) 100/57     Weight: 47.6 kg (105 lb)  Body mass index is 18.02 kg/m².    Intake/Output Summary (Last 24 hours) at 7/23/2019 0943  Last data filed at 7/23/2019 0811  Gross per 24 hour   Intake 360 ml   Output --   Net 360 ml      Physical Exam   Constitutional: She is oriented to person, place, and time. She appears well-developed and well-nourished.   HENT:   Head: Normocephalic and atraumatic.   Right Ear: External ear normal.   Left Ear: External ear normal.   Nose: Nose normal.   Mouth/Throat: Oropharynx is clear and moist.   Eyes: Pupils are equal, round, and reactive to light. Conjunctivae and EOM are normal.   Neck: Normal range of motion. Neck  supple. No tracheal deviation present. No thyromegaly present.   Cardiovascular: Normal rate, regular rhythm, normal heart sounds and intact distal pulses. Exam reveals no gallop and no friction rub.   No murmur heard.  Pulmonary/Chest: Effort normal. No respiratory distress. She has no wheezes.   02 in place.    Clear breath sounds b/l   Abdominal: Soft. Bowel sounds are normal.   Musculoskeletal: Normal range of motion. She exhibits no edema.   Left arm in cast    Neurological: She is alert and oriented to person, place, and time. She has normal reflexes. No cranial nerve deficit.   Skin: Skin is warm and dry.   Psychiatric: She has a normal mood and affect. Her behavior is normal. Judgment and thought content normal.   Nursing note and vitals reviewed.    Lab Results   Component Value Date    WBC 8.18 07/17/2019    HGB 11.2 (L) 07/17/2019    HCT 35.2 (L) 07/17/2019    MCV 87 07/17/2019     07/17/2019     BMP  Lab Results   Component Value Date     (L) 07/17/2019    K 4.8 07/17/2019    CL 85 (L) 07/17/2019    CO2 40 (H) 07/17/2019    BUN 16 07/17/2019    CREATININE 0.6 07/17/2019    CALCIUM 9.1 07/17/2019    ANIONGAP 4 (L) 07/17/2019    ESTGFRAFRICA >60 07/17/2019    EGFRNONAA >60 07/17/2019     Lab Results   Component Value Date    ALT 17 07/13/2019    AST 21 07/13/2019    ALKPHOS 65 07/13/2019    BILITOT 0.4 07/13/2019     Lactic acid 0.6  Lab Results   Component Value Date    DDIMER 1.15 (H) 07/12/2019     Lab Results   Component Value Date    INR 1.0 07/12/2019    INR 0.9 06/14/2019    INR 1.0 06/14/2017         Flu negative     Blood cultures NGTD x 2    7/15 CTA   Unilateral hyperlucent lung involving the right lower lobe.  Findings could reflect asymmetric pulmonary emphysema versus bronchiolitis obliterans or sequela of Swyer Louis syndrome.    No evidence of pulmonary embolism.  No evidence of aneurysm or dissection.    CXR Chronic lung changes are noted bilaterally.  There is  hyperlucency of the right lung likely related to underlying bullous change.  The heart is normal in size.  Calcified atheromatous disease affects the aorta.  Age-appropriate degenerative changes affect the skeleton.    EKG Baseline wander Normal sinus rhythm  Right axis deviation  T wave abnormality, consider anterior ischemia or Persistent Normal  variant, juvenile T waves  Abnormal ECG  When compared with ECG of 06-JUL-2019 01:08,  No significant change was found

## 2019-07-24 NOTE — SUBJECTIVE & OBJECTIVE
Review of Systems   Constitutional: Negative for chills and fever.   HENT: Negative for congestion, ear discharge, hearing loss, postnasal drip, sinus pressure and sore throat.    Respiratory: Positive for cough. Negative for apnea, choking, chest tightness, shortness of breath and wheezing.    Cardiovascular: Negative for chest pain, palpitations and leg swelling.   Gastrointestinal: Negative for blood in stool, nausea and vomiting.   Genitourinary: Negative for dysuria, hematuria and vaginal discharge.   Musculoskeletal: Positive for arthralgias and back pain. Negative for myalgias.        Left arm in cast   Chronic pain issues    Skin: Negative for pallor.   Neurological: Negative for dizziness and numbness.   Hematological: Does not bruise/bleed easily.   Psychiatric/Behavioral: Positive for sleep disturbance. Negative for confusion and suicidal ideas. The patient is not nervous/anxious.      Objective:     Vital Signs (Most Recent):  Temp: 97.9 °F (36.6 °C) (07/24/19 0426)  Pulse: 85 (07/24/19 0704)  Resp: 16 (07/24/19 0704)  BP: 128/76 (07/24/19 0426)  SpO2: 98 % (07/24/19 0704) Vital Signs (24h Range):  Temp:  [97.4 °F (36.3 °C)-98.7 °F (37.1 °C)] 97.9 °F (36.6 °C)  Pulse:  [80-96] 85  Resp:  [16-20] 16  SpO2:  [95 %-98 %] 98 %  BP: (103-128)/(55-76) 128/76     Weight: 47.6 kg (105 lb)  Body mass index is 18.02 kg/m².    Intake/Output Summary (Last 24 hours) at 7/24/2019 0902  Last data filed at 7/23/2019 1733  Gross per 24 hour   Intake 480 ml   Output --   Net 480 ml      Physical Exam   Constitutional: She is oriented to person, place, and time. She appears well-developed and well-nourished.   HENT:   Head: Normocephalic and atraumatic.   Right Ear: External ear normal.   Left Ear: External ear normal.   Nose: Nose normal.   Mouth/Throat: Oropharynx is clear and moist.   Eyes: Pupils are equal, round, and reactive to light. Conjunctivae and EOM are normal.   Neck: Normal range of motion. Neck supple. No  tracheal deviation present. No thyromegaly present.   Cardiovascular: Normal rate, regular rhythm, normal heart sounds and intact distal pulses. Exam reveals no gallop and no friction rub.   No murmur heard.  Pulmonary/Chest: Effort normal. No respiratory distress. She has no wheezes.   02 in place.    Clear breath sounds b/l   Abdominal: Soft. Bowel sounds are normal.   Musculoskeletal: Normal range of motion. She exhibits no edema.   Left arm in cast    Neurological: She is alert and oriented to person, place, and time. She has normal reflexes. No cranial nerve deficit.   Skin: Skin is warm and dry.   Psychiatric: She has a normal mood and affect. Her behavior is normal. Judgment and thought content normal.   Nursing note and vitals reviewed.    Lab Results   Component Value Date    WBC 8.18 07/17/2019    HGB 11.2 (L) 07/17/2019    HCT 35.2 (L) 07/17/2019    MCV 87 07/17/2019     07/17/2019     BMP  Lab Results   Component Value Date     (L) 07/17/2019    K 4.8 07/17/2019    CL 85 (L) 07/17/2019    CO2 40 (H) 07/17/2019    BUN 16 07/17/2019    CREATININE 0.6 07/17/2019    CALCIUM 9.1 07/17/2019    ANIONGAP 4 (L) 07/17/2019    ESTGFRAFRICA >60 07/17/2019    EGFRNONAA >60 07/17/2019     Lab Results   Component Value Date    ALT 17 07/13/2019    AST 21 07/13/2019    ALKPHOS 65 07/13/2019    BILITOT 0.4 07/13/2019     Lactic acid 0.6  Lab Results   Component Value Date    DDIMER 1.15 (H) 07/12/2019     Lab Results   Component Value Date    INR 1.0 07/12/2019    INR 0.9 06/14/2019    INR 1.0 06/14/2017         Flu negative     Blood cultures NGTD x 2    7/15 CTA   Unilateral hyperlucent lung involving the right lower lobe.  Findings could reflect asymmetric pulmonary emphysema versus bronchiolitis obliterans or sequela of Swyer Louis syndrome.    No evidence of pulmonary embolism.  No evidence of aneurysm or dissection.    CXR Chronic lung changes are noted bilaterally.  There is hyperlucency of the  right lung likely related to underlying bullous change.  The heart is normal in size.  Calcified atheromatous disease affects the aorta.  Age-appropriate degenerative changes affect the skeleton.    EKG Baseline wander Normal sinus rhythm  Right axis deviation  T wave abnormality, consider anterior ischemia or Persistent Normal  variant, juvenile T waves  Abnormal ECG  When compared with ECG of 06-JUL-2019 01:08,  No significant change was found

## 2019-07-24 NOTE — PLAN OF CARE
07/24/19 1040   Medicare Message   Important Message from Medicare regarding Discharge Appeal Rights Given to patient/caregiver;Explained to patient/caregiver;Signed/date by patient/caregiver   Date IMM was signed 07/24/19   Time IMM was signed 0910

## 2019-07-24 NOTE — PLAN OF CARE
07/24/19 1349   Post-Acute Status   Post-Acute Authorization Placement   Post-Acute Placement Status Set-up Complete       Received patient's 142 via fax this morning. Faxed 142, PASRR and discharge summary to [facility] via [employer] ([email or web address] Ciara with [facility] stated that she will review the discharge paperwork and inform SW when [facility] will be able to come and  the patient as they will provide transportation to the nursing home.     Mee Morton, ETIENNESW

## 2019-07-24 NOTE — PT/OT/SLP PROGRESS
"Physical Therapy Treatment    Patient Name:  Di Deluca   MRN:  [id number]    Recommendations:     Discharge Recommendations:  nursing facility, skilled   Discharge Equipment Recommendations: walker, rolling   Barriers to discharge: Inaccessible home and Decreased caregiver support    Assessment:     Di Deluca is a 68 y.o. female admitted with a medical diagnosis of COPD exacerbation.  She presents with the following impairments/functional limitations:  weakness, impaired functional mobilty, gait instability, impaired self care skills, impaired cardiopulmonary response to activity, impaired endurance, decreased upper extremity function. Patient has shown a significant progress  and has met PT goals established. Anjuetjamir has increased general body endurance with increased gait distances and performed ascending/ descending stair steps with no sign of respiratory distress. SPO2 prior to gait=98%; After gait(200feet)=94%; After 8 stair steps= 91%.     Rehab Prognosis: Good; patient would benefit from acute skilled PT services to address these deficits and reach maximum level of function.    Recent Surgery: * No surgery found *      Plan:     During this hospitalization, patient to be seen 5 x/week to address the identified rehab impairments via gait training, therapeutic activities, therapeutic exercises and progress toward the following goals:    · Plan of Care Expires:  07/25/19    Subjective     Chief Complaint: Patient is ready to go to SNF at [facility] today. No new complain.  Patient/Family Comments/goals: "To get better and able to take care myself".  Pain/Comfort:  · Pain Rating 1: 0/10  · Pain Rating Post-Intervention 1: 0/10      Objective:     Communicated with patient prior to session.  Patient found HOB elevated with oxygen, telemetry upon PT entry to room.     General Precautions: Standard, fall   Orthopedic Precautions:N/A   Braces: N/A     Functional Mobility:  · Bed Mobility:   "   · Rolling Left:  independence  · Rolling Right: independence  · Scooting: independence  · Supine to Sit: independence  · Sit to Supine: independence  · Transfers:     · Sit to Stand:  independence with no AD  · Bed to Chair: independence with  no AD  using  Step Transfer  · Gait: RW Ambulation x 200 feet with modified Independent. Reciporcal Gait.  · Stairs:  Pt ascended/descended 8 steps stair(s) with No Assistive Device with bilateral handrails with Supervision or Set-up Assistance.       AM-PAC 6 CLICK MOBILITY  Turning over in bed (including adjusting bedclothes, sheets and blankets)?: 4  Sitting down on and standing up from a chair with arms (e.g., wheelchair, bedside commode, etc.): 4  Moving from lying on back to sitting on the side of the bed?: 4  Moving to and from a bed to a chair (including a wheelchair)?: 4  Need to walk in hospital room?: 3  Climbing 3-5 steps with a railing?: 3  Basic Mobility Total Score: 22       Therapeutic Activities and Exercises:   GAIT: Pt able to ambulate with rolling walker with Modified Independent x 200 ft with the following gait deviation(s): Reciprocal Gait; Stairs mgt/trng; Reviewed and performed Home Exercise Program on Bilat LE exercises consisting of Active range of motion exercises x 10  reps consisting of Ankle DF, Ankle PF, Quad Sets, Heel slides, ABD/ADD, LAQ with pt able to tolerate activities well .   PT discussed importance of patient's performance of Home Exercise Program (HEP) with pt verbalizing understanding.     Patient left HOB elevated with all lines intact, call button in reach, bed alarm on and nurse notified..    GOALS:   Multidisciplinary Problems     Physical Therapy Goals     Not on file          Multidisciplinary Problems (Resolved)        Problem: Physical Therapy Goal    Goal Priority Disciplines Outcome Goal Variances Interventions   Physical Therapy Goal   (Resolved)     PT, PT/OT Outcome(s) achieved     Description:  Goals to be met by:  19     Patient will increase functional independence with mobility by performin. Sit to stand transfer with [place]  2. Bed to chair transfer with [place]  3. Gait  x 100 feet with Supervision.   4. Ascend/descend 17 stair with bilateral Handrails Stand-by Assistance.   5. Lower extremity exercise program x10 reps per handout, with independence                      Time Tracking:     PT Received On: 19  PT Start Time: 1230     PT Stop Time: 1302  PT Total Time (min): 32 min     Billable Minutes: Gait Training 15 and Therapeutic Activity 15    Treatment Type: Treatment  PT/PTA: PT           Ryne Cabezas, PT  2019

## 2019-07-24 NOTE — DISCHARGE SUMMARY
"[facility] Medicine  Discharge Summary      Patient Name: Di Deluca  MRN: [id number]  Admission Date: 7/12/2019  Hospital Length of Stay: 12 days  Discharge Date and Time:  07/24/2019 11:06 AM  Attending Physician: Lee Ku MD   Discharging Provider: Vivienne Patrick NP  Primary Care Provider: Thomas Huertas MD      HPI:     Di Dleuca is a 68 y.o. female with PMH of severe COPD /on  Home oxygen ,   LBP, spur, chickenpox, HTN, rheumatic disease, thyroid disease parented to ER with shortness of breath.  She used to live in [place] ; moved to her friend here " got kicked out of house "  Friend reports pain med issues . Multiple ER visit >  Seems home less ; has two living daughters :[place]. Recently fell broke left arm  Seen Dr damien armendariz ; went to er for pain meds and COPD   Patient has shortness of breath, longstanding history of COPD reported this p.m.  This is not new to us, this patient has been to the ER several times in last 2 months  She had  active wheezing on ER presentation this morning  Patient is on 4 liters of oxygen at home still smokes at least 2 packs a day for years.    Placed in observation for COPD exacerbation .  She will need NH placement ; no place to live ; multiple ER visits :  She was told for NH placement 3 months ago;she declined .      * No surgery found *      Hospital Course:   7/13/19  Looks better ;keeps asking for pain meds  Again long discussion .  Shortness of breath better   Wheezing better   BIPAP ordered.  She is on rocephin , zithromax,     7/14/19  Breathing wise much better   Will need NH placement ; home less   Will taper steroids  Continue nebs and antibiotics     7/15/19  She is on day 4 azithromycin and rocephin. She has reduced medrol 40mg IV every 8hr. Using O2 (has 3L at home) here on 2L pox 95%. No fever. WBC 53680. Still smoker. nicotine patch ordered  Asking about her Fioricet for chronic pain as this is what " she is using at home long term  She also reports that she has left short arm cast due to fall. Refused PT yesterday. Had it placed 1 week ago.     7/16/19 NAD overnight. 2LNC - O2 sat 98-99%  WBC 14.76> 11.31, afebrile   She is on day 5/5 azithromycin and rocephin. Getting prednisone 40mg bid; taper to 10mg bid then daily for home  Na+ 124>128 after 1LNS  Pt reports feeling is much better and breathing is much better.   She needs NH placement ; homeless ;with respiratory issues ;  Awaiting for nursing approval; level II screening in process .    7/17/19 NAD , Afebrile, WBC 8.18 , O2 98% on 2LNC   ABX completed for COPD exac, Prednisone 10mg bid;  this am   Na 129+ ,   Waiting on required level II forms for possible nursing home admission  7/18/19  Remains stable. Planned for psyche consult today for clearance  for Nursing home admission  pt able to ambulate without assistive device x 40 ft with CGA/SBA while on supplemental oxygen via NC.  - O 2 sat 96-97%  Getting prednisone 10mg bid; -134   C/O left ear drainage, requesting lidocaine patches for left shoulder pain and low back pain. C/o inability to sleep due to pain.   Notes she broke left arm 13 days ago trying to break fall. States she needs to wear cast for 6 weeks. Again asking for pain Rx    7/19/19  Has been stable; awaiting  state approval for nursing home.   VSS. Afebrile. 2LNC - O2 sat 96-98%.   C/o vaginal yeast symptoms     7/20/19  Patient tolerating physical therapy.  COPD symptoms under control.  Lidocaine patches seem to be helping her pain.    7/21/19  Still waiting for nursing home placement.  Patient's COPD symptoms are much better.  She does require continuous oxygen.  Lidocaine patches help.  She slept well last night.  She is requesting Vistaril as needed for anxiety, Neosporin for the sores in her nose.    7/22/19  Case management working with patient for nursing home placement. Awaiting STATE approval of 90L 2nd ami screen.   Marisela did on 9/18. [facility] placement pending    Her COPD is at her baseline. Using home O2 at 2.5L NC     Refused PT yesterday stating that she was too weak. Saturday she did well 100ft x2 trials on 2.5 L supplemental O2.  Pt with very slow gait speed and very short step length requiring multiple standing rest breaks.    7/23/19   pt has recovered from her COPD exacerbation. Steroids stopped yesterday as she completed 7 days prednisone BID. She is not longer on abx. Breathing is at baseline with nebs every 4hr. No fever on home dose 02.   Still awaiting state for level 2 approval.    7/24 pt has finally state approval for nursing home placement. No changes to her status. OK to d/con current meds.      Consults:   Consults (From admission, onward)        Status Ordering Provider     Inpatient consult to Psychiatry  Once     Provider:  Richard Antonio MD    Completed NATALIO SCHMIDT     Inpatient consult to Pulmonology  Once     Provider:  Javier Vance MD    Acknowledged ROSAMARIA COLLINS     Inpatient consult to Social Work  Once     Provider:  (Not yet assigned)    Acknowledged ROSAMARIA COLLINS          * COPD exacerbation  IV steroids; taper from 125 to 40 mg ..    7/15: prednisone 40mg PO BID.    IV antibiotics x 1 more day  Nebs   Pulmonary consulted .    7/17 Prednisone 10mg bid; Abx completed Improving and anticipate discharge soon. Awaiting NH placement  D/c today s/p 7 days    7/20 IV sites have been lost.  No need to restart an IV.  Telemetry will be discontinued.  Continue DuoNeb treatments every 6 hr.  Continue prednisone 10 mg twice daily.    7/22 COPD exacerbation resolved. Day 7 of prednisone 10mg po BID; stopped today. Also on duoneb every 4 hr--lungs are clear but patient is requesting continue    7/23 cont nebs. She is on home routine. exacerbation resolved.    Homeless single person  Will need NH placement she is not sure if she wants this.  working with her. If  not waiting on nursing home may be able tod/c in am. Will need to find where she is going. She is working on that today   PPD ordered for NH placement .  Psyche eval this am per Dr. Antonio, awaiting approval for nursing home  7/19 state approval pending .  7/22 still waiting on STATE- can take 7-10 days. [facility] placement pending. She is medically stable for discharge pending placement  7/124 approved! d/c today    Chronic narcotic use  Avoid narcotics.  Tizinidine ordered .  Fioricet ordered .  Requesting lidocaine patch ordered    Closed nondisplaced fracture of styloid process of left ulna with routine healing  S/p cast   .PT  F/u outpt.    Closed Colles' fracture of left radius with routine healing  S/p cast by orthopedics (2 weeks ago). Reports that she needs to be casted x 6 weeks total   Will f/u oupt but was seen in [place]. Will need ortho in [place] at d/c 4 weeks        Chronic respiratory failure with hypoxia and hypercapnia  Continue nebs.  Checked ABG   Recommend BIPAP-- she is not using  Stable on 2LNC.          Tobacco abuse  Nicotine patch .  Counseled again       Essential hypertension  Continue amlodipine   Well controlled 104//72.      Hyponatremia  Mos likely due to her pulmonary issues  BMP  Lab Results   Component Value Date     (L) 07/17/2019    K 4.8 07/17/2019    CL 85 (L) 07/17/2019    CO2 40 (H) 07/17/2019    BUN 16 07/17/2019    CREATININE 0.6 07/17/2019    CALCIUM 9.1 07/17/2019    ANIONGAP 4 (L) 07/17/2019    ESTGFRAFRICA >60 07/17/2019    EGFRNONAA >60 07/17/2019       Improved.  Free water restriction      Final Active Diagnoses:    Diagnosis Date Noted POA    PRINCIPAL PROBLEM:  COPD exacerbation [J44.1] 06/14/2019 Yes    Closed Colles' fracture of left radius with routine healing [S52.532D] 07/12/2019 Not Applicable    Closed nondisplaced fracture of styloid process of left ulna with routine healing [S52.615D] 07/12/2019 Not Applicable    Chronic  narcotic use [F11.90] 07/12/2019 Yes    Homeless single person [Z59.0] 07/12/2019 Not Applicable    Chronic respiratory failure with hypoxia and hypercapnia [J96.11, J96.12] 06/15/2019 Yes    Essential hypertension [I10] 06/14/2017 Yes    Tobacco abuse [Z72.0] 06/14/2017 Yes    Hyponatremia [E87.1] 06/14/2017 Yes      Problems Resolved During this Admission:       Discharged Condition: good    Disposition: Long Term Care    Follow Up:  Follow-up Information     Thomas Huertas MD In 1 week.    Specialty:  Endocrinology  Contact information:  [address] [place], [place] [address]  [place] [address]  [phone]             Dat Pinto MD In 4 weeks.    Specialty:  Orthopedic Surgery  Contact information:  [address] [place] [address] ORTHOPEDICS  [facility] [address]  [phone]                 Patient Instructions:   No discharge procedures on file.    Significant Diagnostic Studies:     Lab Results   Component Value Date    WBC 8.18 07/17/2019    HGB 11.2 (L) 07/17/2019    HCT 35.2 (L) 07/17/2019    MCV 87 07/17/2019     07/17/2019     BMP  Lab Results   Component Value Date     (L) 07/17/2019    K 4.8 07/17/2019    CL 85 (L) 07/17/2019    CO2 40 (H) 07/17/2019    BUN 16 07/17/2019    CREATININE 0.6 07/17/2019    CALCIUM 9.1 07/17/2019    ANIONGAP 4 (L) 07/17/2019    ESTGFRAFRICA >60 07/17/2019    EGFRNONAA >60 07/17/2019     Lab Results   Component Value Date    ALT 17 07/13/2019    AST 21 07/13/2019    ALKPHOS 65 07/13/2019    BILITOT 0.4 07/13/2019     Lactic acid 0.6  Lab Results   Component Value Date    DDIMER 1.15 (H) 07/12/2019     Lab Results   Component Value Date    INR 1.0 07/12/2019    INR 0.9 06/14/2019    INR 1.0 06/14/2017         Flu negative     Blood cultures NGTD x 2    7/15 CTA   Unilateral hyperlucent lung involving the right lower lobe.  Findings could reflect asymmetric pulmonary emphysema versus bronchiolitis obliterans or sequela of Swyer Louis syndrome.    No evidence of  pulmonary embolism.  No evidence of aneurysm or dissection.    CXR Chronic lung changes are noted bilaterally.  There is hyperlucency of the right lung likely related to underlying bullous change.  The heart is normal in size.  Calcified atheromatous disease affects the aorta.  Age-appropriate degenerative changes affect the skeleton.    EKG Baseline wander Normal sinus rhythm  Right axis deviation  T wave abnormality, consider anterior ischemia or Persistent Normal  variant, juvenile T waves  Abnormal ECG  When compared with ECG of 06-JUL-2019 01:08,  No significant change was found    Pending Diagnostic Studies:     None         Medications:  Reconciled Home Medications:      Medication List      START taking these medications    docusate sodium 100 MG capsule  Commonly known as:  COLACE  Take 1 capsule (100 mg total) by mouth once daily.     hydrOXYzine pamoate 25 MG Cap  Commonly known as:  VISTARIL  Take 1 capsule (25 mg total) by mouth every 8 (eight) hours as needed.     lidocaine 5 %  Commonly known as:  LIDODERM  Place 1 patch onto the skin once daily. Remove & Discard patch within 12 hours or as directed by MD     meloxicam 7.5 MG tablet  Commonly known as:  MOBIC  Take 1 tablet (7.5 mg total) by mouth once daily.  Start taking on:  7/25/2019        CONTINUE taking these medications    albuterol-ipratropium 2.5 mg-0.5 mg/3 mL nebulizer solution  Commonly known as:  DUO-NEB  Take 3 mLs by nebulization every 6 (six) hours as needed for Wheezing or Shortness of Breath. Rescue     amLODIPine 10 MG tablet  Commonly known as:  NORVASC  Take 1 tablet (10 mg total) by mouth once daily.     butalbital-aspirin-caffeine -40 mg -40 mg Cap  Commonly known as:  FIORINAL  Take 1 capsule by mouth every 4 (four) hours as needed.     levothyroxine 125 MCG tablet  Commonly known as:  SYNTHROID  Take 125 mcg by mouth once daily.     rosuvastatin 10 MG tablet  Commonly known as:  CRESTOR  Take 10 mg by mouth once  daily.     tiZANidine 4 mg Cap  Take 4 mg by mouth 4 (four) times daily as needed.        STOP taking these medications    albuterol 90 mcg/actuation inhaler  Commonly known as:  PROVENTIL/VENTOLIN HFA     HYDROcodone-acetaminophen  mg per tablet  Commonly known as:  NORCO            Indwelling Lines/Drains at time of discharge:   Lines/Drains/Airways          None          Time spent on the discharge of patient: 30 minutes  Patient was seen and examined on the date of discharge and determined to be suitable for discharge.         Vivienne Patrick NP  Department of Hospital Medicine  [facility]

## 2019-07-24 NOTE — PLAN OF CARE
Problem: Adult Inpatient Plan of Care  Goal: Plan of Care Review  Outcome: Ongoing (interventions implemented as appropriate)  Quiet hours, rested well. Medicated last PM as requested per pt. O2 at 2L/NC, sats 96%. SR on tele monitor. Accucheck 123 requiring no coverage. Awaiting approval of state for nursing home transfer. Plan of care reviewed with pt, voiced understanding. SR up X3, call bell in reach. Instructed to call for needs or assistance.

## 2019-07-24 NOTE — PLAN OF CARE
07/24/19 1231   Discharge Reassessment   Assessment Type Final Discharge Note   Anticipated Discharge Disposition SNF   Can the patient answer the patient profile reliably? Yes, cognitively intact   Post-Acute Status   Post-Acute Authorization Placement   Post-Acute Placement Status Set-up Complete   Discharge Delays None known at this time       Patient will discharge to nursing home today. Transportation set up with nursing home. Patient reminded about what signs and symptoms to look for when discharged, and educated that if any symptoms return, make a same day appointment with PCP or come back to the ED. Patient states understanding and agreement. Patient denies any other needs or concerns for discharge.

## 2019-07-24 NOTE — NURSING
Report called to Corrina Nurse at [facility]. Informed nurse of pt medical hx currrent hospital stay, medication regime and future appts. Left phone number for more information.

## 2019-07-24 NOTE — PT/OT/SLP DISCHARGE
Occupational Therapy Discharge Summary    Di Deluca  MRN: [id number]   Principal Problem: COPD exacerbation      Patient Discharged from acute Occupational Therapy on 7/24/2019.  Please refer to prior OT note dated 7/23/2019 for functional status.    Assessment:      Patient appropriate for care in another setting.    Objective:     GOALS:   Multidisciplinary Problems     Occupational Therapy Goals     Not on file          Multidisciplinary Problems (Resolved)        Problem: Occupational Therapy Goal    Goal Priority Disciplines Outcome Interventions   Occupational Therapy Goal   (Resolved)     OT, PT/OT Outcome(s) achieved    Description:  Goals to be met by: 7/31/2019     Patient will increase functional independence with ADLs by performing:    LE Dressing with [place].  Grooming while standing at sink with [place].  Toileting from toilet with [place] for hygiene and clothing management.   Bathing from  shower chair/bench with Modified [place].  Upper extremity exercise program x10 reps per handout, with assistance as needed with stable vital signs. 02 > 90 % on 2 liters.                       Reasons for Discontinuation of Therapy Services  Transfer to alternate level of care.      Plan:     Patient Discharged to: Skilled Nursing Facility today     Aidee Nettles OT  7/24/2019

## 2019-07-24 NOTE — ASSESSMENT & PLAN NOTE
Will need NH placement she is not sure if she wants this.  working with her. If not waiting on nursing home may be able tod/c in am. Will need to find where she is going. She is working on that today   PPD ordered for NH placement .  Psyche eval this am per Dr. Antonio, awaiting approval for nursing home  7/19 state approval pending .  7/22 still waiting on STATE- can take 7-10 days. [facility] placement pending. She is medically stable for discharge pending placement  7/124 approved! d/c today

## 2019-07-24 NOTE — PLAN OF CARE
07/24/19 1407   Final Note   Assessment Type Final Discharge Note   Anticipated Discharge Disposition SNF   What phone number can be called within the next 1-3 days to see how you are doing after discharge? [phone]   Hospital Follow Up  Appt(s) scheduled? Yes   Discharge plans and expectations educations in teach back method with documentation complete? Yes       Patient discharged to [facility]. Contacted Ciara with [facility] who informs that the patient will be picked up by the [facility] transportation bus between 4:30pm-5pm. TREY contacted patient's daughter to inform of patient's discharge today.     Mee Morton LMSW

## 2019-07-24 NOTE — NURSING
Bedside report complete. Pt in bed, resting comfortably. Denies C/O at this time. SR up X3, call bell in reach. Instructed to call for needs or assistance.

## 2019-07-24 NOTE — PROGRESS NOTES
Discharge Medication Reconciliation - Pharmacy Consult Note     The discharge medication regimen was reviewed by Huma Harp, PharmD. The following medications have been adjusted/changed:     Patient is to INITIATE the following medications   See list below    Patient is to DISCONTINUE the following medications   See list below    Patient is to HOLD the following medications   none    The following medications DOSE or FREQUENCY was CHANGED  none    The following issues/concerns were addressed prior to discharge:   Discharge medication counseling   Ms. Deluca has been well informed of all meds, what they are for, how to take and potential side effects. She requests her lidocaine patches to be changed before lunch. She did not have any further questions or concerns about her meds at this time.     Bedside delivery of medications   Transferring to nursing home per patient    Medication affordability   unknown       Medication List        START taking these medications      docusate sodium 100 MG capsule  Commonly known as:  COLACE  Take 1 capsule (100 mg total) by mouth once daily.     hydrOXYzine pamoate 25 MG Cap  Commonly known as:  VISTARIL  Take 1 capsule (25 mg total) by mouth every 8 (eight) hours as needed.     lidocaine 5 %  Commonly known as:  LIDODERM  Place 1 patch onto the skin once daily. Remove & Discard patch within 12 hours or as directed by MD     meloxicam 7.5 MG tablet  Commonly known as:  MOBIC  Take 1 tablet (7.5 mg total) by mouth once daily.  Start taking on:  7/25/2019            CONTINUE taking these medications      albuterol-ipratropium 2.5 mg-0.5 mg/3 mL nebulizer solution  Commonly known as:  DUO-NEB  Take 3 mLs by nebulization every 6 (six) hours as needed for Wheezing or Shortness of Breath. Rescue     amLODIPine 10 MG tablet  Commonly known as:  NORVASC  Take 1 tablet (10 mg total) by mouth once daily.     butalbital-aspirin-caffeine -40 mg -40 mg Cap  Commonly known as:   FIORINAL  Take 1 capsule by mouth every 4 (four) hours as needed.     levothyroxine 125 MCG tablet  Commonly known as:  SYNTHROID     rosuvastatin 10 MG tablet  Commonly known as:  CRESTOR     tiZANidine 4 mg Cap  Take 4 mg by mouth 4 (four) times daily as needed.            STOP taking these medications      albuterol 90 mcg/actuation inhaler  Commonly known as:  PROVENTIL/VENTOLIN HFA     HYDROcodone-acetaminophen  mg per tablet  Commonly known as:  NORCO               Where to Get Your Medications        These medications were sent to [facility] [address], [place] [address]      Hours:  Mon-Fri, 8a-5:30p Phone:  [phone]   hydrOXYzine pamoate 25 MG Cap  lidocaine 5 %  meloxicam 7.5 MG tablet  tiZANidine 4 mg Cap       You can get these medications from any pharmacy    You don't need a prescription for these medications  docusate sodium 100 MG capsule       Huma Harp, PharmD  [id number]

## 2019-07-24 NOTE — ASSESSMENT & PLAN NOTE
S/p cast by orthopedics (2 weeks ago). Reports that she needs to be casted x 6 weeks total   Will f/u oupt but was seen in [place]. Will need ortho in [place] at d/c 4 weeks

## 2019-07-24 NOTE — PROGRESS NOTES
"[facility] Medicine  Progress Note    Patient Name: Di Deluca  MRN: [id number]  Patient Class: IP- Inpatient   Admission Date: 7/12/2019  Length of Stay: 12 days  Attending Physician: Lee Ku MD  Primary Care Provider: Thomas Huertas MD        Subjective:     Principal Problem:COPD exacerbation      HPI:    Di Deluca is a 68 y.o. female with PMH of severe COPD /on  Home oxygen ,   LBP, spur, chickenpox, HTN, rheumatic disease, thyroid disease parented to ER with shortness of breath.  She used to live in [place] ; moved to her friend here " got kicked out of house "  Friend reports pain med issues . Multiple ER visit >  Seems home less ; has two living daughters :[place]. Recently fell broke left arm  Seen Dr damien armendariz ; went to er for pain meds and COPD   Patient has shortness of breath, longstanding history of COPD reported this p.m.  This is not new to us, this patient has been to the ER several times in last 2 months  She had  active wheezing on ER presentation this morning  Patient is on 4 liters of oxygen at home still smokes at least 2 packs a day for years.    Placed in observation for COPD exacerbation .  She will need NH placement ; no place to live ; multiple ER visits :  She was told for NH placement 3 months ago;she declined .      Overview/Hospital Course:  7/13/19  Looks better ;keeps asking for pain meds  Again long discussion .  Shortness of breath better   Wheezing better   BIPAP ordered.  She is on rocephin , zithromax,     7/14/19  Breathing wise much better   Will need NH placement ; home less   Will taper steroids  Continue nebs and antibiotics     7/15/19  She is on day 4 azithromycin and rocephin. She has reduced medrol 40mg IV every 8hr. Using O2 (has 3L at home) here on 2L pox 95%. No fever. WBC 79989. Still smoker. nicotine patch ordered  Asking about her Fioricet for chronic pain as this is what she is using at home long term  She " also reports that she has left short arm cast due to fall. Refused PT yesterday. Had it placed 1 week ago.     7/16/19 NAD overnight. 2LNC - O2 sat 98-99%  WBC 14.76> 11.31, afebrile   She is on day 5/5 azithromycin and rocephin. Getting prednisone 40mg bid; taper to 10mg bid then daily for home  Na+ 124>128 after 1LNS  Pt reports feeling is much better and breathing is much better.   She needs NH placement ; homeless ;with respiratory issues ;  Awaiting for nursing approval; level II screening in process .    7/17/19 NAD , Afebrile, WBC 8.18 , O2 98% on 2LNC   ABX completed for COPD exac, Prednisone 10mg bid;  this am   Na 129+ ,   Waiting on required level II forms for possible nursing home admission  7/18/19  Remains stable. Planned for psyche consult today for clearance  for Nursing home admission  pt able to ambulate without assistive device x 40 ft with CGA/SBA while on supplemental oxygen via NC.  - O 2 sat 96-97%  Getting prednisone 10mg bid; -134   C/O left ear drainage, requesting lidocaine patches for left shoulder pain and low back pain. C/o inability to sleep due to pain.   Notes she broke left arm 13 days ago trying to break fall. States she needs to wear cast for 6 weeks. Again asking for pain Rx    7/19/19  Has been stable; awaiting  state approval for nursing home.   VSS. Afebrile. 2LNC - O2 sat 96-98%.   C/o vaginal yeast symptoms     7/20/19  Patient tolerating physical therapy.  COPD symptoms under control.  Lidocaine patches seem to be helping her pain.    7/21/19  Still waiting for nursing home placement.  Patient's COPD symptoms are much better.  She does require continuous oxygen.  Lidocaine patches help.  She slept well last night.  She is requesting Vistaril as needed for anxiety, Neosporin for the sores in her nose.    7/22/19  Case management working with patient for nursing home placement. Awaiting STATE approval of 90L 2nd ami screen. Dr Antonio did on 9/18. [facility]  [place] placement pending    Her COPD is at her baseline. Using home O2 at 2.5L NC     Refused PT yesterday stating that she was too weak. Saturday she did well 100ft x2 trials on 2.5 L supplemental O2.  Pt with very slow gait speed and very short step length requiring multiple standing rest breaks.    7/23/19   pt has recovered from her COPD exacerbation. Steroids stopped yesterday as she completed 7 days prednisone BID. She is not longer on abx. Breathing is at baseline with nebs every 4hr. No fever on home dose 02.   Still awaiting state for level 2 approval.    7/24 pt has finally state approval for nursing home placement. No changes to her status. OK to d/con current meds.     Review of Systems   Constitutional: Negative for chills and fever.   HENT: Negative for congestion, ear discharge, hearing loss, postnasal drip, sinus pressure and sore throat.    Respiratory: Positive for cough. Negative for apnea, choking, chest tightness, shortness of breath and wheezing.    Cardiovascular: Negative for chest pain, palpitations and leg swelling.   Gastrointestinal: Negative for blood in stool, nausea and vomiting.   Genitourinary: Negative for dysuria, hematuria and vaginal discharge.   Musculoskeletal: Positive for arthralgias and back pain. Negative for myalgias.        Left arm in cast   Chronic pain issues    Skin: Negative for pallor.   Neurological: Negative for dizziness and numbness.   Hematological: Does not bruise/bleed easily.   Psychiatric/Behavioral: Positive for sleep disturbance. Negative for confusion and suicidal ideas. The patient is not nervous/anxious.      Objective:     Vital Signs (Most Recent):  Temp: 97.9 °F (36.6 °C) (07/24/19 0426)  Pulse: 85 (07/24/19 0704)  Resp: 16 (07/24/19 0704)  BP: 128/76 (07/24/19 0426)  SpO2: 98 % (07/24/19 0704) Vital Signs (24h Range):  Temp:  [97.4 °F (36.3 °C)-98.7 °F (37.1 °C)] 97.9 °F (36.6 °C)  Pulse:  [80-96] 85  Resp:  [16-20] 16  SpO2:  [95 %-98 %] 98  %  BP: (103-128)/(55-76) 128/76     Weight: 47.6 kg (105 lb)  Body mass index is 18.02 kg/m².    Intake/Output Summary (Last 24 hours) at 7/24/2019 0902  Last data filed at 7/23/2019 1733  Gross per 24 hour   Intake 480 ml   Output --   Net 480 ml      Physical Exam   Constitutional: She is oriented to person, place, and time. She appears well-developed and well-nourished.   HENT:   Head: Normocephalic and atraumatic.   Right Ear: External ear normal.   Left Ear: External ear normal.   Nose: Nose normal.   Mouth/Throat: Oropharynx is clear and moist.   Eyes: Pupils are equal, round, and reactive to light. Conjunctivae and EOM are normal.   Neck: Normal range of motion. Neck supple. No tracheal deviation present. No thyromegaly present.   Cardiovascular: Normal rate, regular rhythm, normal heart sounds and intact distal pulses. Exam reveals no gallop and no friction rub.   No murmur heard.  Pulmonary/Chest: Effort normal. No respiratory distress. She has no wheezes.   02 in place.    Clear breath sounds b/l   Abdominal: Soft. Bowel sounds are normal.   Musculoskeletal: Normal range of motion. She exhibits no edema.   Left arm in cast    Neurological: She is alert and oriented to person, place, and time. She has normal reflexes. No cranial nerve deficit.   Skin: Skin is warm and dry.   Psychiatric: She has a normal mood and affect. Her behavior is normal. Judgment and thought content normal.   Nursing note and vitals reviewed.    Lab Results   Component Value Date    WBC 8.18 07/17/2019    HGB 11.2 (L) 07/17/2019    HCT 35.2 (L) 07/17/2019    MCV 87 07/17/2019     07/17/2019     BMP  Lab Results   Component Value Date     (L) 07/17/2019    K 4.8 07/17/2019    CL 85 (L) 07/17/2019    CO2 40 (H) 07/17/2019    BUN 16 07/17/2019    CREATININE 0.6 07/17/2019    CALCIUM 9.1 07/17/2019    ANIONGAP 4 (L) 07/17/2019    ESTGFRAFRICA >60 07/17/2019    EGFRNONAA >60 07/17/2019     Lab Results   Component Value Date     ALT 17 07/13/2019    AST 21 07/13/2019    ALKPHOS 65 07/13/2019    BILITOT 0.4 07/13/2019     Lactic acid 0.6  Lab Results   Component Value Date    DDIMER 1.15 (H) 07/12/2019     Lab Results   Component Value Date    INR 1.0 07/12/2019    INR 0.9 06/14/2019    INR 1.0 06/14/2017         Flu negative     Blood cultures NGTD x 2    7/15 CTA   Unilateral hyperlucent lung involving the right lower lobe.  Findings could reflect asymmetric pulmonary emphysema versus bronchiolitis obliterans or sequela of Swyer Louis syndrome.    No evidence of pulmonary embolism.  No evidence of aneurysm or dissection.    CXR Chronic lung changes are noted bilaterally.  There is hyperlucency of the right lung likely related to underlying bullous change.  The heart is normal in size.  Calcified atheromatous disease affects the aorta.  Age-appropriate degenerative changes affect the skeleton.    EKG Baseline wander Normal sinus rhythm  Right axis deviation  T wave abnormality, consider anterior ischemia or Persistent Normal  variant, juvenile T waves  Abnormal ECG  When compared with ECG of 06-JUL-2019 01:08,  No significant change was found      Assessment/Plan:      * COPD exacerbation  IV steroids; taper from 125 to 40 mg ..    7/15: prednisone 40mg PO BID.    IV antibiotics x 1 more day  Nebs   Pulmonary consulted .    7/17 Prednisone 10mg bid; Abx completed Improving and anticipate discharge soon. Awaiting NH placement  D/c today s/p 7 days    7/20 IV sites have been lost.  No need to restart an IV.  Telemetry will be discontinued.  Continue DuoNeb treatments every 6 hr.  Continue prednisone 10 mg twice daily.    7/22 COPD exacerbation resolved. Day 7 of prednisone 10mg po BID; stopped today. Also on duoneb every 4 hr--lungs are clear but patient is requesting continue    7/23 cont nebs. She is on home routine. exacerbation resolved.    Acute respiratory failure with hypoxia and hypercarbia        Homeless single  person  Will need NH placement she is not sure if she wants this.  working with her. If not waiting on nursing home may be able tod/c in am. Will need to find where she is going. She is working on that today   PPD ordered for NH placement .  [facility] eval this am per Dr. Antonio, awaiting approval for nursing home  7/19 state approval pending .  7/22 still waiting on STATE- can take 7-10 days. [facility] placement pending. She is medically stable for discharge pending placement  7/124 approved! d/c today    Chronic narcotic use  Avoid narcotics.  Tizinidine ordered .  Fioricet ordered .  Requesting lidocaine patch ordered    Closed nondisplaced fracture of styloid process of left ulna with routine healing  S/p cast   .PT  F/u outpt.    Closed Colles' fracture of left radius with routine healing  S/p cast by orthopedics (2 weeks ago). Reports that she needs to be casted x 6 weeks total   Will f/u oupt but was seen in [place]. Will need ortho in [place] at d/c 4 weeks        Chronic obstructive pulmonary disease  Nebs  For now   Needs to stop smoking .      Chronic respiratory failure with hypoxia and hypercapnia  Continue nebs.  Checked ABG   Recommend BIPAP-- she is not using  Stable on 2LNC.          Tobacco abuse  Nicotine patch .  Counseled again       Essential hypertension  Continue amlodipine   Well controlled 104//72.      Hyponatremia  Mos likely due to her pulmonary issues  BMP  Lab Results   Component Value Date     (L) 07/17/2019    K 4.8 07/17/2019    CL 85 (L) 07/17/2019    CO2 40 (H) 07/17/2019    BUN 16 07/17/2019    CREATININE 0.6 07/17/2019    CALCIUM 9.1 07/17/2019    ANIONGAP 4 (L) 07/17/2019    ESTGFRAFRICA >60 07/17/2019    EGFRNONAA >60 07/17/2019       Improved.  Free water restriction      VTE Risk Mitigation (From admission, onward)        Ordered     IP VTE HIGH RISK PATIENT  Once      07/12/19 1224     Place sequential compression device  Until discontinued       07/12/19 1224                Stephen Baltazar MD  Department of Hospital Medicine   [facility]

## 2020-03-17 PROBLEM — J18.9 PNEUMONIA OF BOTH LUNGS DUE TO INFECTIOUS ORGANISM: Status: ACTIVE | Noted: 2020-01-01

## 2020-03-17 PROBLEM — R06.89 CO2 NARCOSIS: Status: ACTIVE | Noted: 2020-01-01

## 2020-03-17 PROBLEM — J96.02 ACUTE HYPERCAPNIC RESPIRATORY FAILURE: Status: ACTIVE | Noted: 2020-01-01

## 2020-03-17 PROBLEM — R41.82 ALTERED MENTAL STATUS: Status: ACTIVE | Noted: 2020-01-01

## 2020-03-20 PROBLEM — A41.9 SEVERE SEPSIS: Status: ACTIVE | Noted: 2020-01-01

## 2020-03-20 PROBLEM — R63.8 ALTERATION IN NUTRITION: Status: ACTIVE | Noted: 2020-01-01

## 2020-03-20 PROBLEM — J14 HAEMOPHILUS INFLUENZAE PNEUMONIA: Status: ACTIVE | Noted: 2020-01-01

## 2020-03-20 PROBLEM — R65.20 SEVERE SEPSIS: Status: ACTIVE | Noted: 2020-01-01

## 2020-03-24 PROBLEM — J14 PNEUMONIA OF BOTH LUNGS DUE TO HAEMOPHILUS INFLUENZAE: Status: ACTIVE | Noted: 2020-01-01

## 2020-03-26 PROBLEM — R63.8 ALTERATION IN NUTRITION: Status: RESOLVED | Noted: 2020-01-01 | Resolved: 2020-01-01

## 2020-05-11 PROBLEM — U07.1 COVID-19: Status: ACTIVE | Noted: 2020-01-01

## 2020-05-11 PROBLEM — R07.9 CHEST PAIN: Status: ACTIVE | Noted: 2020-01-01

## 2020-05-14 PROBLEM — I46.9 CARDIAC ARREST: Status: ACTIVE | Noted: 2020-01-01

## 2020-05-14 PROBLEM — J93.9 BILATERAL PNEUMOTHORAX: Status: ACTIVE | Noted: 2020-01-01

## 2020-10-28 NOTE — PLAN OF CARE
07/18/19 1207   Discharge Reassessment   Assessment Type Discharge Planning Reassessment   Discharge Plan A New Nursing Home placement - intermediate care facility   Post-Acute Status   Post-Acute Authorization Placement   Post-Acute Placement Status Pending State Certification   Discharge Delays (!) Other  (Pending level 2 screen )         Patient will remain for continued treatment today. No needs or concerns voiced at this time. CM will continue to follow and assist as needed.      Prabhjot called from [employer] [facility] and would like a tech to take care of the request for this prescription for the patient. The prescription is Xiidra and the to use is Key number: [id number].    Any questions please call Prabhjot at [phone].

## 2023-11-17 NOTE — ASSESSMENT & PLAN NOTE
Still smoking   Preparing to see the patient including review of tests and other providers' notes, confirming history with patient/family member, performing medical examination and evaluation, counseling and educating the patient/family/caregiver, ordering medications, tests and procedures, communicating with other health care professionals, documenting clinical information in the EMR, independently interpreting results and communicating results to the patient/family/caregiver, care coordination

## 2023-12-01 NOTE — PLAN OF CARE
06/17/19 1246   Advance Directives (For Healthcare)   Advance Directive  (If Adv Dir status is received, view document under Code in header or Chart Review Media tab) Patient does not have Advance Directive, declines information.     Patient confirms she is a full code.  
   06/17/19 1249   Final Note   Assessment Type Final Discharge Note   Anticipated Discharge Disposition Home   What phone number can be called within the next 1-3 days to see how you are doing after discharge? [phone]   Hospital Follow Up  Appt(s) scheduled? Yes   Discharge plans and expectations educations in teach back method with documentation complete? Yes   Right Care Referral Info   Post Acute Recommendation No Care     Ms Deluca has no post acute care needs at this time.  
   06/17/19 1251   Discharge Assessment   Assessment Type Discharge Planning Assessment   Confirmed/corrected address and phone number on facesheet? Yes   Assessment information obtained from? Patient   Expected Length of Stay (days) 2   Communicated expected length of stay with patient/caregiver yes   Prior to hospitilization cognitive status: Alert/Oriented   Prior to hospitalization functional status: Independent   Current cognitive status: Alert/Oriented   Current Functional Status: Independent   Facility Arrived From: home   Lives With friend(s)   Able to Return to Prior Arrangements yes   Is patient able to care for self after discharge? Yes   Who are your caregiver(s) and their phone number(s)? [phone]   Patient's perception of discharge disposition home or selfcare   Readmission Within the Last 30 Days no previous admission in last 30 days   Patient currently being followed by outpatient case management? No   Patient currently receives any other outside agency services? No   Equipment Currently Used at Home nebulizer;oxygen   Do you have any problems affording any of your prescribed medications? No   Is the patient taking medications as prescribed? yes   Does the patient have transportation home? Yes   Transportation Anticipated family or friend will provide   Does the patient receive services at the [facility]? No   Discharge Plan A Home   Discharge Plan B Home   DME Needed Upon Discharge  none   Patient/Family in Agreement with Plan yes     Ms Deluca plans to discharge to her friend, Deidre Gonzalez's, house when stable. CM contact information and discharge brochure given. Brochure checklist reviewed with patient and all questions answered. Discharge information sheet for copd given including signs and symptoms indicating return to ED or call to PCP. Compliance and understanding voiced.    
Problem: Adult Inpatient Plan of Care  Goal: Plan of Care Review  Outcome: Ongoing (interventions implemented as appropriate)  Patient admitted with COPD exacerbation. Pulmonary consult on case. Patient was more cooperative in using BiPAP during the day today. O2 sats 95-98%. More alert. Was given Lactulose and with good results for constipation. Will become very dyspneic upon exertion to and from bedside toilet. Has chronic pain to lower back and headaches. Medicated with fioricet, tizanidine, and hydrocodone 5 mg which did not adversely  effect her alertness or respiratory effort. Appetite good. Vital signs stable. No falls or injuries this shift. Aware and agree with plan of care. Will continue to monitor.      
Problem: Adult Inpatient Plan of Care  Goal: Plan of Care Review  Outcome: Ongoing (interventions implemented as appropriate)  Patient here with worsening SOB through ED; to CCu for hypercapnic resp failure. Patient currently on 2l canula refusing to wear Bipap; O2 sats >90% with HI and accessory muscle use. Complaining of chronic back pain with relief per prn meds, OOb to commode, instructed on POC with verbalized understanding.         
Problem: Adult Inpatient Plan of Care  Goal: Plan of Care Review  Outcome: Ongoing (interventions implemented as appropriate)  Patient is improving, was downgraded to med-surg unit. Remains short of breath with exertion. Using BiPAP during the day intermittently with nasal cannula at 2 lpm. Blood gas results improving. Requested lactulose to be given again today with good results. Numerous BM's noted. Afebrile, vital signs stable. Chronic low back pain controled with oral pain meds and muscle relaxers. Remains alert with pain medications. IV antibiotics and steroids continued. Fall precautions maintained. Agrees to call for assistance as needed. Agrees with plan of care.      
Problem: Adult Inpatient Plan of Care  Goal: Plan of Care Review  Outcome: Ongoing (interventions implemented as appropriate)  Patient new admit this shift with worsening SOB through ED; to CCu for hypercapnic resp failure. Patient currently on 2l canula refusing to wear Bipap; O2 sats >90% with HI and accessory muscle use. Complaining of chronic back pain, OOb to commode, instructed on POC.        
Problem: Adult Inpatient Plan of Care  Goal: Plan of Care Review  Outcome: Ongoing (interventions implemented as appropriate)  Patient resting with some complaints of back pain and spasms. Stated relief with PRN medicines. Patient with some SOB and abdominal breathing on exertion. On 2L NC with some coarseness in lung bases. VS stable. A&O. Patient up to [facility], free from falls/injury. Patient states she needs to be discharged today, she was in the process of moving when she became ill. No acute changes noted. Plan of care reviewed and agreed upon with patient.         
Problem: Adult Inpatient Plan of Care  Goal: Plan of Care Review  Outcome: Ongoing (interventions implemented as appropriate)  Patient up ad brendan. No distress noted. Oxygen 2 L per NC per home use. Afebrile. Time spent with patient to review plan of care for home. Patient talked about not using pesticides in closed spaces. Reviewed treatment for COPD. Patient denies needs. Safety and comfort maintained. Agrees with plan to discharge home.       
GOAL: Patient will transfer with min A with RW in 2 weeks
Patient will perform all transfers independently, in 4 weeks.

## 2024-10-22 NOTE — PLAN OF CARE
Problem: Adult Inpatient Plan of Care  Goal: Plan of Care Review  Outcome: Ongoing (interventions implemented as appropriate)  Patient with some complaints of constant pain in back and headache. Some relief stated with PRN/scheduled medicines. Up to [facility] independently, free from falls/injury. VS stable. A&O. Multiple bruises all over. No acute changes noted. Waiting on state approval for placement. Plan of care reviewed and agreed upon.        [facility]

## 2025-07-09 NOTE — PT/OT/SLP PROGRESS
"Physical Therapy Treatment    Patient Name:  Di Deluca   MRN:  [id number]    Recommendations:     Discharge Recommendations:  nursing facility, skilled, nursing facility, basic   Discharge Equipment Recommendations: walker, rolling   Barriers to discharge: Inaccessible home and Decreased caregiver support    Assessment:     Di Deluca is a 68 y.o. female admitted with a medical diagnosis of COPD exacerbation.  She presents with the following impairments/functional limitations:  weakness, impaired self care skills, impaired functional mobilty, impaired endurance, impaired cardiopulmonary response to activity, gait instability, decreased upper extremity function, pain . Patient is showing good steady progress towards PT goals. Noted increased tolerance with functional mobility and gait functions with no sign of respiratory distress. Today monitored patient's SPO2 prior to Gait= 95%; After 100 feet walk= 96%.    Rehab Prognosis: Good; patient would benefit from acute skilled PT services to address these deficits and reach maximum level of function.    Recent Surgery: * No surgery found *      Plan:     During this hospitalization, patient to be seen 5 x/week to address the identified rehab impairments via gait training, therapeutic activities, therapeutic exercises and progress toward the following goals:    · Plan of Care Expires:  07/25/19    Subjective     Chief Complaint: Patient has no new complain.  Patient/Family Comments/goals: "To be able to move around better and take care myself".  Pain/Comfort:  · Pain Rating 1: 0/10  · Pain Rating Post-Intervention 1: 0/10      Objective:     Communicated with patient prior to session.  Patient found HOB elevated with oxygen, telemetry upon PT entry to room.     General Precautions: Standard, fall   Orthopedic Precautions:N/A   Braces: N/A     Functional Mobility:  · Bed Mobility:     · Rolling Left:  modified independence  · Rolling Right: modified " independence  · Scooting: supervision  · Supine to Sit: supervision  · Sit to Supine: supervision  · Transfers:     · Sit to Stand:  supervision with rolling walker  · Bed to Chair: supervision with  rolling walker  using  Step Transfer  · Gait: RW Ambulation x 100 feet(twice) with Supervision. Slight dec myriam and slight dec swing to stance ratio.      AM-PAC 6 CLICK MOBILITY  Turning over in bed (including adjusting bedclothes, sheets and blankets)?: 4  Sitting down on and standing up from a chair with arms (e.g., wheelchair, bedside commode, etc.): 3  Moving from lying on back to sitting on the side of the bed?: 4  Moving to and from a bed to a chair (including a wheelchair)?: 3  Need to walk in hospital room?: 3  Climbing 3-5 steps with a railing?: 3  Basic Mobility Total Score: 20       Therapeutic Activities and Exercises:   Implemented gait trng using RW followed by performing  Bilat LE exercises consisting of Active range of motion exercises x 10 reps consisting of Ankle DF, Ankle PF, Quad Sets, Heel slides, ABD/ADD, LAQ and Marching at Sitting with pt able to tolerate activities well with rest periods and proper diaphragmatic deep breath ex.   PT discussed importance of patient's performance of Home Exercise Program (HEP) with pt verbalizing understanding.     Patient left at HOB elevated with all lines intact, call button in reach and nurse notified..    GOALS:   Multidisciplinary Problems     Physical Therapy Goals        Problem: Physical Therapy Goal    Goal Priority Disciplines Outcome Goal Variances Interventions   Physical Therapy Goal     PT, PT/OT Ongoing (interventions implemented as appropriate)     Description:  Goals to be met by: 19     Patient will increase functional independence with mobility by performin. Sit to stand transfer with [place]  2. Bed to chair transfer with [place]  3. Gait  x 100 feet with Supervision.   4. Ascend/descend 17 stair with bilateral  Handrails Stand-by Assistance.   5. Lower extremity exercise program x10 reps per handout, with independence                      Time Tracking:     PT Received On: 07/22/19  PT Start Time: 1253     PT Stop Time: 1325  PT Total Time (min): 32 min     Billable Minutes: Gait Training 15 and Therapeutic Exercise 15    Treatment Type: Treatment  PT/PTA: PT           Ryne Cabezas, PT  07/22/2019   Ambulance